# Patient Record
Sex: FEMALE | Race: WHITE | NOT HISPANIC OR LATINO | Employment: FULL TIME | URBAN - METROPOLITAN AREA
[De-identification: names, ages, dates, MRNs, and addresses within clinical notes are randomized per-mention and may not be internally consistent; named-entity substitution may affect disease eponyms.]

---

## 2020-11-21 ENCOUNTER — HOSPITAL ENCOUNTER (EMERGENCY)
Facility: HOSPITAL | Age: 40
Discharge: HOME | End: 2020-11-21
Attending: EMERGENCY MEDICINE
Payer: OTHER MISCELLANEOUS

## 2020-11-21 VITALS
HEIGHT: 66 IN | DIASTOLIC BLOOD PRESSURE: 81 MMHG | OXYGEN SATURATION: 99 % | HEART RATE: 100 BPM | WEIGHT: 220 LBS | TEMPERATURE: 98 F | BODY MASS INDEX: 35.36 KG/M2 | SYSTOLIC BLOOD PRESSURE: 126 MMHG | RESPIRATION RATE: 16 BRPM

## 2020-11-21 DIAGNOSIS — S16.1XXA STRAIN OF NECK MUSCLE, INITIAL ENCOUNTER: Primary | ICD-10-CM

## 2020-11-21 DIAGNOSIS — S00.03XA CONTUSION OF SCALP, INITIAL ENCOUNTER: ICD-10-CM

## 2020-11-21 PROCEDURE — 99282 EMERGENCY DEPT VISIT SF MDM: CPT

## 2020-11-21 NOTE — Clinical Note
"Uma Bello" Willem was seen and treated in our emergency department on 11/21/2020.  She may return to work on 11/30/2020.       If you have any questions or concerns, please don't hesitate to call.      Mikhail Nance MD"

## 2020-11-21 NOTE — ED NOTES
AAOx4, skin warm/dry, respirations even/unlabored.  NAD.  Patient verbalizes readiness for discharge.  [facility] form faxed to [facility] and original given to patient.
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
## 2020-11-30 ENCOUNTER — OFFICE VISIT (OUTPATIENT)
Dept: URGENT CARE | Facility: CLINIC | Age: 40
End: 2020-11-30
Payer: OTHER MISCELLANEOUS

## 2020-11-30 VITALS
HEIGHT: 66 IN | SYSTOLIC BLOOD PRESSURE: 139 MMHG | TEMPERATURE: 99 F | WEIGHT: 242 LBS | BODY MASS INDEX: 38.89 KG/M2 | HEART RATE: 87 BPM | DIASTOLIC BLOOD PRESSURE: 87 MMHG | OXYGEN SATURATION: 99 %

## 2020-11-30 DIAGNOSIS — S46.919A STRAIN OF SHOULDER, UNSPECIFIED LATERALITY, INITIAL ENCOUNTER: ICD-10-CM

## 2020-11-30 DIAGNOSIS — S16.1XXA CERVICAL STRAIN, ACUTE, INITIAL ENCOUNTER: Primary | ICD-10-CM
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
## 2020-11-30 PROCEDURE — 99204 OFFICE O/P NEW MOD 45 MIN: CPT | Mod: 25,S$GLB,, | Performed by: NURSE PRACTITIONER

## 2020-11-30 PROCEDURE — 96372 THER/PROPH/DIAG INJ SC/IM: CPT | Mod: S$GLB,,, | Performed by: NURSE PRACTITIONER

## 2020-11-30 PROCEDURE — 96372 PR INJECTION,THERAP/PROPH/DIAG2ST, IM OR SUBCUT: ICD-10-PCS | Mod: S$GLB,,, | Performed by: NURSE PRACTITIONER

## 2020-11-30 PROCEDURE — 99204 PR OFFICE/OUTPT VISIT, NEW, LEVL IV, 45-59 MIN: ICD-10-PCS | Mod: 25,S$GLB,, | Performed by: NURSE PRACTITIONER

## 2020-11-30 RX ORDER — METHOCARBAMOL 500 MG/1
500 TABLET, FILM COATED ORAL 3 TIMES DAILY PRN
Qty: 30 TABLET | Refills: 0 | Status: SHIPPED | OUTPATIENT
Start: 2020-11-30 | End: 2020-12-10

## 2020-11-30 RX ORDER — KETOROLAC TROMETHAMINE 30 MG/ML
30 INJECTION, SOLUTION INTRAMUSCULAR; INTRAVENOUS
Status: COMPLETED | OUTPATIENT
Start: 2020-11-30 | End: 2020-11-30

## 2020-11-30 RX ORDER — DEXAMETHASONE SODIUM PHOSPHATE 4 MG/ML
8 INJECTION, SOLUTION INTRA-ARTICULAR; INTRALESIONAL; INTRAMUSCULAR; INTRAVENOUS; SOFT TISSUE
Status: COMPLETED | OUTPATIENT
Start: 2020-11-30 | End: 2020-11-30

## 2020-11-30 RX ORDER — DICLOFENAC SODIUM 50 MG/1
50 TABLET, DELAYED RELEASE ORAL 3 TIMES DAILY PRN
Qty: 30 TABLET | Refills: 0 | Status: SHIPPED | OUTPATIENT
Start: 2020-11-30 | End: 2022-07-26 | Stop reason: ALTCHOICE

## 2020-11-30 RX ORDER — PREDNISONE 20 MG/1
20 TABLET ORAL 2 TIMES DAILY
Qty: 10 TABLET | Refills: 0 | Status: SHIPPED | OUTPATIENT
Start: 2020-11-30 | End: 2020-12-05

## 2020-11-30 RX ADMIN — DEXAMETHASONE SODIUM PHOSPHATE 8 MG: 4 INJECTION, SOLUTION INTRA-ARTICULAR; INTRALESIONAL; INTRAMUSCULAR; INTRAVENOUS; SOFT TISSUE at 07:11

## 2020-11-30 RX ADMIN — KETOROLAC TROMETHAMINE 30 MG: 30 INJECTION, SOLUTION INTRAMUSCULAR; INTRAVENOUS at 07:11
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
## 2020-12-02 ENCOUNTER — TELEPHONE (OUTPATIENT)
Dept: URGENT CARE | Facility: CLINIC | Age: 40
End: 2020-12-02

## 2020-12-02 NOTE — TELEPHONE ENCOUNTER
Pt called concerned about her prescribed meds. Stated she is feeling really jittery and not sleeping much. rabia recommended she discontinue taking the steroids.

## 2020-12-04 ENCOUNTER — OFFICE VISIT (OUTPATIENT)
Dept: URGENT CARE | Facility: CLINIC | Age: 40
End: 2020-12-04
Payer: OTHER MISCELLANEOUS

## 2020-12-04 VITALS
HEART RATE: 79 BPM | HEIGHT: 66 IN | OXYGEN SATURATION: 97 % | SYSTOLIC BLOOD PRESSURE: 125 MMHG | BODY MASS INDEX: 38.09 KG/M2 | RESPIRATION RATE: 16 BRPM | DIASTOLIC BLOOD PRESSURE: 87 MMHG | WEIGHT: 237 LBS

## 2020-12-04 DIAGNOSIS — S13.9XXA NECK SPRAIN, INITIAL ENCOUNTER: Primary | ICD-10-CM

## 2020-12-04 PROCEDURE — 99214 PR OFFICE/OUTPT VISIT, EST, LEVL IV, 30-39 MIN: ICD-10-PCS | Mod: S$GLB,,, | Performed by: NURSE PRACTITIONER

## 2020-12-04 PROCEDURE — 99214 OFFICE O/P EST MOD 30 MIN: CPT | Mod: S$GLB,,, | Performed by: NURSE PRACTITIONER
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
## 2020-12-04 NOTE — PATIENT INSTRUCTIONS
Neck Sprain or Strain  A sudden force that causes turning or bending of the neck can cause sprain or strain. An example would be the force from a car accident. This can stretch or tear muscles called a strain. It can also stretch or tear ligaments called a sprain. Either of these can cause neck pain. Sometimes neck pain occurs after a simple awkward movement. In either case, muscle spasm is commonly present and contributes to the pain.     Unless you had a forceful physical injury (for example, a car accident or fall), X-rays are usually not ordered for the initial evaluation of neck pain. If pain continues and dose not respond to medical treatment, X-rays and other tests may be performed at a later time.  Home care  · You may feel more soreness and spasm the first few days after the injury. Rest until symptoms begin to improve.  · When lying down, use a comfortable pillow or a rolled towel that supports the head and keeps the spine in a neutral position. The position of the head should not be tilted forward or backward.  · Apply an ice pack over the injured area for 15 to 20 minutes every 3 to 6 hours. You should do this for the first 24 to 48 hours. You can make an ice pack by filling a plastic bag that seals at the top with ice cubes and then wrapping it with a thin towel. After 48 hours, apply heat (warm shower or warm bath) for 15 to 20 minutes several times a day, or alternate ice and heat.  · You may use over-the-counter pain medicine to control pain, unless another pain medicine was prescribed. If you have chronic liver or kidney disease or ever had a stomach ulcer or GI bleeding, talk with your healthcare provider before using these medicines.  · If a soft cervical collar was prescribed, it should be worn only for periods of increased pain. It should not be worn for more than 3 hours a day, or for a period longer than 1 to 2 weeks.  Follow-up care  Follow up with your healthcare provider as directed.  Physical therapy may be needed.  Sometimes fractures dont show up on the first X-ray. Bruises and sprains can sometimes hurt as much as a fracture. These injuries can take time to heal completely. If your symptoms dont improve or they get worse, talk with your healthcare provider. You may need a repeat X-ray or other tests. If X-rays were taken, you will be told of any new findings that may affect your care.  Call 911  Call 911 if you have:  · Neck swelling, difficulty or painful swallowing  · Difficulty breathing  · Chest pain  When to seek medical advice  Call your healthcare provider right away if any of these occur:  · Pain becomes worse or spreads into your arms  · Weakness or numbness in one or both arms  Date Last Reviewed: 11/19/2015  © 2062-5332 [employer]. [address] [place] [address], [place] [address]. All rights reserved. This information is not intended as a substitute for professional medical care. Always follow your healthcare professional's instructions.

## 2020-12-14 RX ORDER — METHOCARBAMOL 750 MG/1
TABLET, FILM COATED ORAL
Qty: 15 TABLET | Refills: 0 | Status: SHIPPED | OUTPATIENT
Start: 2020-12-14 | End: 2022-07-26 | Stop reason: ALTCHOICE

## 2020-12-14 RX ORDER — DICLOFENAC SODIUM 50 MG/1
50 TABLET, DELAYED RELEASE ORAL 2 TIMES DAILY
Qty: 20 TABLET | Refills: 0 | Status: SHIPPED | OUTPATIENT
Start: 2020-12-14 | End: 2020-12-24

## 2020-12-15 ENCOUNTER — TELEPHONE (OUTPATIENT)
Dept: URGENT CARE | Facility: CLINIC | Age: 40
End: 2020-12-15

## 2020-12-15 NOTE — TELEPHONE ENCOUNTER
Work comp called yesterday to see if we could prescribe mrs. Bangura something before her visit with ortho on Wednesday to get her through today. Medication was sent in last night and just called patient to inform her of med call in.

## 2021-05-04 ENCOUNTER — PATIENT MESSAGE (OUTPATIENT)
Dept: RESEARCH | Facility: HOSPITAL | Age: 41
End: 2021-05-04

## 2021-08-01 ENCOUNTER — HOSPITAL ENCOUNTER (EMERGENCY)
Facility: HOSPITAL | Age: 41
Discharge: HOME | End: 2021-08-01
Attending: EMERGENCY MEDICINE
Payer: COMMERCIAL

## 2021-08-01 VITALS
RESPIRATION RATE: 18 BRPM | HEART RATE: 99 BPM | SYSTOLIC BLOOD PRESSURE: 129 MMHG | WEIGHT: 230.38 LBS | BODY MASS INDEX: 37.03 KG/M2 | HEIGHT: 66 IN | TEMPERATURE: 100 F | OXYGEN SATURATION: 100 % | DIASTOLIC BLOOD PRESSURE: 75 MMHG

## 2021-08-01 DIAGNOSIS — U07.1 COVID-19 VIRUS INFECTION: Primary | ICD-10-CM

## 2021-08-01 DIAGNOSIS — U07.1 COVID-19 VIRUS DETECTED: ICD-10-CM

## 2021-08-01 LAB — SARS-COV-2 RDRP RESP QL NAA+PROBE: POSITIVE

## 2021-08-01 PROCEDURE — U0002 COVID-19 LAB TEST NON-CDC: HCPCS | Performed by: EMERGENCY MEDICINE

## 2021-08-01 PROCEDURE — 99283 EMERGENCY DEPT VISIT LOW MDM: CPT

## 2022-07-26 ENCOUNTER — OFFICE VISIT (OUTPATIENT)
Dept: FAMILY MEDICINE | Facility: CLINIC | Age: 42
End: 2022-07-26
Payer: COMMERCIAL

## 2022-07-26 ENCOUNTER — LAB VISIT (OUTPATIENT)
Dept: LAB | Facility: HOSPITAL | Age: 42
End: 2022-07-26
Attending: NURSE PRACTITIONER
Payer: COMMERCIAL

## 2022-07-26 ENCOUNTER — HOSPITAL ENCOUNTER (OUTPATIENT)
Dept: RADIOLOGY | Facility: HOSPITAL | Age: 42
Discharge: HOME | End: 2022-07-26
Attending: NURSE PRACTITIONER
Payer: COMMERCIAL

## 2022-07-26 VITALS
DIASTOLIC BLOOD PRESSURE: 72 MMHG | WEIGHT: 239.19 LBS | SYSTOLIC BLOOD PRESSURE: 128 MMHG | HEART RATE: 82 BPM | HEIGHT: 66 IN | BODY MASS INDEX: 38.44 KG/M2 | TEMPERATURE: 98 F | OXYGEN SATURATION: 97 %

## 2022-07-26 DIAGNOSIS — F41.1 ANXIETY STATE: ICD-10-CM

## 2022-07-26 DIAGNOSIS — Z00.00 ANNUAL PHYSICAL EXAM: Primary | ICD-10-CM

## 2022-07-26 DIAGNOSIS — Z11.59 ENCOUNTER FOR HEPATITIS C SCREENING TEST FOR LOW RISK PATIENT: ICD-10-CM

## 2022-07-26 DIAGNOSIS — K21.9 GASTROESOPHAGEAL REFLUX DISEASE WITHOUT ESOPHAGITIS: ICD-10-CM

## 2022-07-26 DIAGNOSIS — Z11.4 SCREENING FOR HIV (HUMAN IMMUNODEFICIENCY VIRUS): ICD-10-CM

## 2022-07-26 DIAGNOSIS — Z12.39 ENCOUNTER FOR SCREENING FOR MALIGNANT NEOPLASM OF BREAST, UNSPECIFIED SCREENING MODALITY: ICD-10-CM

## 2022-07-26 DIAGNOSIS — E04.9 NODULAR GOITER: ICD-10-CM

## 2022-07-26 DIAGNOSIS — Z01.411 ENCOUNTER FOR GYNECOLOGICAL EXAMINATION WITH ABNORMAL FINDING: ICD-10-CM

## 2022-07-26 DIAGNOSIS — Z00.00 ANNUAL PHYSICAL EXAM: ICD-10-CM

## 2022-07-26 LAB
ALBUMIN SERPL BCP-MCNC: 3.6 G/DL (ref 3.5–5.2)
ALP SERPL-CCNC: 77 U/L (ref 55–135)
ALT SERPL W/O P-5'-P-CCNC: 17 U/L (ref 10–44)
ANION GAP SERPL CALC-SCNC: 11 MMOL/L (ref 8–16)
AST SERPL-CCNC: 19 U/L (ref 10–40)
BASOPHILS # BLD AUTO: 0.05 K/UL (ref 0–0.2)
BASOPHILS NFR BLD: 0.9 % (ref 0–1.9)
BILIRUB SERPL-MCNC: 0.4 MG/DL (ref 0.1–1)
BUN SERPL-MCNC: 13 MG/DL (ref 6–20)
CALCIUM SERPL-MCNC: 9.4 MG/DL (ref 8.7–10.5)
CHLORIDE SERPL-SCNC: 104 MMOL/L (ref 95–110)
CHOLEST SERPL-MCNC: 179 MG/DL (ref 120–199)
CHOLEST/HDLC SERPL: 3.1 {RATIO} (ref 2–5)
CO2 SERPL-SCNC: 23 MMOL/L (ref 23–29)
CREAT SERPL-MCNC: 0.9 MG/DL (ref 0.5–1.4)
DIFFERENTIAL METHOD: ABNORMAL
EOSINOPHIL # BLD AUTO: 0.1 K/UL (ref 0–0.5)
EOSINOPHIL NFR BLD: 1.7 % (ref 0–8)
ERYTHROCYTE [DISTWIDTH] IN BLOOD BY AUTOMATED COUNT: 13.2 % (ref 11.5–14.5)
EST. GFR  (AFRICAN AMERICAN): >60 ML/MIN/1.73 M^2
EST. GFR  (NON AFRICAN AMERICAN): >60 ML/MIN/1.73 M^2
ESTIMATED AVG GLUCOSE: 105 MG/DL (ref 68–131)
GLUCOSE SERPL-MCNC: 78 MG/DL (ref 70–110)
HBA1C MFR BLD: 5.3 % (ref 4–5.6)
HCT VFR BLD AUTO: 33.4 % (ref 37–48.5)
HDLC SERPL-MCNC: 58 MG/DL (ref 40–75)
HDLC SERPL: 32.4 % (ref 20–50)
HGB BLD-MCNC: 10.3 G/DL (ref 12–16)
IMM GRANULOCYTES # BLD AUTO: 0.01 K/UL (ref 0–0.04)
IMM GRANULOCYTES NFR BLD AUTO: 0.2 % (ref 0–0.5)
LDLC SERPL CALC-MCNC: 88 MG/DL (ref 63–159)
LYMPHOCYTES # BLD AUTO: 2.2 K/UL (ref 1–4.8)
LYMPHOCYTES NFR BLD: 41.5 % (ref 18–48)
MCH RBC QN AUTO: 25.8 PG (ref 27–31)
MCHC RBC AUTO-ENTMCNC: 30.8 G/DL (ref 32–36)
MCV RBC AUTO: 84 FL (ref 82–98)
MONOCYTES # BLD AUTO: 0.3 K/UL (ref 0.3–1)
MONOCYTES NFR BLD: 6.4 % (ref 4–15)
NEUTROPHILS # BLD AUTO: 2.6 K/UL (ref 1.8–7.7)
NEUTROPHILS NFR BLD: 49.3 % (ref 38–73)
NONHDLC SERPL-MCNC: 121 MG/DL
NRBC BLD-RTO: 0 /100 WBC
PLATELET # BLD AUTO: 278 K/UL (ref 150–450)
PMV BLD AUTO: 10.7 FL (ref 9.2–12.9)
POTASSIUM SERPL-SCNC: 4.1 MMOL/L (ref 3.5–5.1)
PROT SERPL-MCNC: 6.7 G/DL (ref 6–8.4)
RBC # BLD AUTO: 4 M/UL (ref 4–5.4)
SODIUM SERPL-SCNC: 138 MMOL/L (ref 136–145)
TRIGL SERPL-MCNC: 165 MG/DL (ref 30–150)
TSH SERPL DL<=0.005 MIU/L-ACNC: 1.9 UIU/ML (ref 0.4–4)
WBC # BLD AUTO: 5.35 K/UL (ref 3.9–12.7)

## 2022-07-26 PROCEDURE — 36415 COLL VENOUS BLD VENIPUNCTURE: CPT | Mod: PO | Performed by: NURSE PRACTITIONER

## 2022-07-26 PROCEDURE — 1159F MED LIST DOCD IN RCRD: CPT | Mod: CPTII,S$GLB,, | Performed by: NURSE PRACTITIONER

## 2022-07-26 PROCEDURE — 3078F DIAST BP <80 MM HG: CPT | Mod: CPTII,S$GLB,, | Performed by: NURSE PRACTITIONER

## 2022-07-26 PROCEDURE — 3074F SYST BP LT 130 MM HG: CPT | Mod: CPTII,S$GLB,, | Performed by: NURSE PRACTITIONER

## 2022-07-26 PROCEDURE — 1160F PR REVIEW ALL MEDS BY PRESCRIBER/CLIN PHARMACIST DOCUMENTED: ICD-10-PCS | Mod: CPTII,S$GLB,, | Performed by: NURSE PRACTITIONER

## 2022-07-26 PROCEDURE — 87389 HIV-1 AG W/HIV-1&-2 AB AG IA: CPT | Performed by: NURSE PRACTITIONER

## 2022-07-26 PROCEDURE — 99386 PR PREVENTIVE VISIT,NEW,40-64: ICD-10-PCS | Mod: S$GLB,,, | Performed by: NURSE PRACTITIONER

## 2022-07-26 PROCEDURE — 80061 LIPID PANEL: CPT | Performed by: NURSE PRACTITIONER

## 2022-07-26 PROCEDURE — 99386 PREV VISIT NEW AGE 40-64: CPT | Mod: S$GLB,,, | Performed by: NURSE PRACTITIONER

## 2022-07-26 PROCEDURE — 1160F RVW MEDS BY RX/DR IN RCRD: CPT | Mod: CPTII,S$GLB,, | Performed by: NURSE PRACTITIONER

## 2022-07-26 PROCEDURE — 86803 HEPATITIS C AB TEST: CPT | Performed by: NURSE PRACTITIONER

## 2022-07-26 PROCEDURE — 99999 PR PBB SHADOW E&M-EST. PATIENT-LVL IV: ICD-10-PCS | Mod: PBBFAC,,, | Performed by: NURSE PRACTITIONER

## 2022-07-26 PROCEDURE — 3008F BODY MASS INDEX DOCD: CPT | Mod: CPTII,S$GLB,, | Performed by: NURSE PRACTITIONER

## 2022-07-26 PROCEDURE — 85025 COMPLETE CBC W/AUTO DIFF WBC: CPT | Performed by: NURSE PRACTITIONER

## 2022-07-26 PROCEDURE — 3078F PR MOST RECENT DIASTOLIC BLOOD PRESSURE < 80 MM HG: ICD-10-PCS | Mod: CPTII,S$GLB,, | Performed by: NURSE PRACTITIONER

## 2022-07-26 PROCEDURE — 1159F PR MEDICATION LIST DOCUMENTED IN MEDICAL RECORD: ICD-10-PCS | Mod: CPTII,S$GLB,, | Performed by: NURSE PRACTITIONER

## 2022-07-26 PROCEDURE — 99999 PR PBB SHADOW E&M-EST. PATIENT-LVL IV: CPT | Mod: PBBFAC,,, | Performed by: NURSE PRACTITIONER

## 2022-07-26 PROCEDURE — 3008F PR BODY MASS INDEX (BMI) DOCUMENTED: ICD-10-PCS | Mod: CPTII,S$GLB,, | Performed by: NURSE PRACTITIONER

## 2022-07-26 PROCEDURE — 84443 ASSAY THYROID STIM HORMONE: CPT | Performed by: NURSE PRACTITIONER

## 2022-07-26 PROCEDURE — 77063 BREAST TOMOSYNTHESIS BI: CPT | Mod: TC,PO

## 2022-07-26 PROCEDURE — 3074F PR MOST RECENT SYSTOLIC BLOOD PRESSURE < 130 MM HG: ICD-10-PCS | Mod: CPTII,S$GLB,, | Performed by: NURSE PRACTITIONER

## 2022-07-26 PROCEDURE — 77067 SCR MAMMO BI INCL CAD: CPT | Mod: TC,PO

## 2022-07-26 PROCEDURE — 83036 HEMOGLOBIN GLYCOSYLATED A1C: CPT | Performed by: NURSE PRACTITIONER

## 2022-07-26 PROCEDURE — 80053 COMPREHEN METABOLIC PANEL: CPT | Performed by: NURSE PRACTITIONER

## 2022-07-26 RX ORDER — CYCLOSPORINE 0.5 MG/ML
1 EMULSION OPHTHALMIC
COMMUNITY
End: 2022-07-26

## 2022-07-26 NOTE — PROGRESS NOTES
Subjective:       Patient ID: Uma Bangura is a 42 y.o. female.    Chief Complaint: Annual Exam    42-year-old female presents to the clinic today for annual physical exam.  Requests a referral to gyn for Pap smear and hormones checkup.  She has had a chronic goiter for many years.  She is currently only on eyedrops for dry eyes.  She takes over-the-counter medication as needed for acid reflux.  She denies any cardiac chest pain, heart palpitations, shortness of breath, swelling to lower extremities.  She denies any coughing or wheezing.  She denies any headaches, dizziness, or blurred vision.    Past Medical History:   Diagnosis Date    Anxiety state, unspecified     Disease of spleen, unspecified     Disturbance of salivary secretion     Edema     GERD (gastroesophageal reflux disease)     Goiter, unspecified     Nonspecific elevation of levels of transaminase or lactic acid dehydrogenase (LDH)     Polyphagia(783.6)     Unspecified sleep apnea     Varices of other sites      Past Surgical History:   Procedure Laterality Date    2 c- section      left ovary removed        reports that she has never smoked. She has never used smokeless tobacco. She reports current alcohol use. She reports that she does not use drugs.  Review of Systems   Constitutional: Negative for chills and fever.   HENT: Negative for congestion and sore throat.    Eyes: Negative for discharge and itching.   Respiratory: Negative for cough, shortness of breath and wheezing.    Cardiovascular: Negative for chest pain, palpitations and leg swelling.   Gastrointestinal: Negative for abdominal pain, blood in stool, constipation, diarrhea, nausea and vomiting.   Musculoskeletal: Negative for gait problem.   Neurological: Positive for headaches. Negative for dizziness, weakness and light-headedness.       Objective:      Physical Exam  Vitals and nursing note reviewed.   Constitutional:       General: She is not in acute distress.      Appearance: Normal appearance. She is well-developed. She is not toxic-appearing or diaphoretic.   HENT:      Head: Normocephalic and atraumatic.      Right Ear: Tympanic membrane, ear canal and external ear normal. There is no impacted cerumen.      Left Ear: Tympanic membrane, ear canal and external ear normal. There is no impacted cerumen.      Nose: Nose normal. No congestion or rhinorrhea.      Mouth/Throat:      Mouth: Mucous membranes are moist.      Pharynx: No oropharyngeal exudate or posterior oropharyngeal erythema.   Eyes:      General: No scleral icterus.        Right eye: No discharge.         Left eye: No discharge.      Extraocular Movements: Extraocular movements intact.      Conjunctiva/sclera: Conjunctivae normal.      Pupils: Pupils are equal, round, and reactive to light.   Neck:      Thyroid: No thyromegaly.      Vascular: No JVD.      Comments: Goiter noted   Cardiovascular:      Rate and Rhythm: Normal rate and regular rhythm.      Heart sounds: No murmur heard.    No friction rub.   Pulmonary:      Effort: Pulmonary effort is normal. No respiratory distress.      Breath sounds: Normal breath sounds. No wheezing or rales.   Abdominal:      General: Bowel sounds are normal.      Palpations: Abdomen is soft.      Tenderness: There is no abdominal tenderness. There is no guarding or rebound.   Musculoskeletal:         General: Normal range of motion.      Cervical back: Normal range of motion and neck supple.   Lymphadenopathy:      Cervical: No cervical adenopathy.   Skin:     General: Skin is warm and dry.      Findings: No rash.   Neurological:      Mental Status: She is alert and oriented to person, place, and time.      Deep Tendon Reflexes: Reflexes normal.   Psychiatric:         Mood and Affect: Mood normal.         Behavior: Behavior normal.         Thought Content: Thought content normal.         Judgment: Judgment normal.         Assessment:       1. Annual physical exam    2.  Gastroesophageal reflux disease without esophagitis    3. Nodular goiter    4. Anxiety state, unspecified    5. Encounter for hepatitis C screening test for low risk patient    6. Screening for HIV (human immunodeficiency virus)        Plan:         Annual physical exam  -     CBC Auto Differential; Future; Expected date: 07/26/2022  -     Comprehensive Metabolic Panel; Future; Expected date: 07/26/2022  -     Lipid Panel; Future; Expected date: 07/26/2022  -     Hemoglobin A1C; Future; Expected date: 07/26/2022  -     TSH; Future; Expected date: 07/26/2022    Gastroesophageal reflux disease without esophagitis  - takes OTC medications as needed     Nodular goiter  - stable will check a TSH    Anxiety state, unspecified  - stable at this time without any medications     Encounter for hepatitis C screening test for low risk patient  -     Hepatitis C Antibody; Future; Expected date: 07/26/2022    Screening for HIV (human immunodeficiency virus)  -     HIV 1/2 Ag/Ab (4th Gen); Future; Expected date: 07/26/2022    Encounter for gynecological examination with abnormal finding  -     Ambulatory referral/consult to Gynecology; Future; Expected date: 08/02/2022    Encounter for screening for malignant neoplasm of breast, unspecified screening modality  -     Mammo Digital Screening Bilat w/ Evens; Future; Expected date: 07/26/2022

## 2022-07-26 NOTE — PATIENT INSTRUCTIONS
Stefan Eaton,     If you are due for any health screening(s) below please notify me so we can arrange them to be ordered and scheduled to maintain your health. Most healthy patients complete it. Don't lose out on improving your health.     Tests to Keep You Healthy    Mammogram: DUE  Cervical Cancer Screening: DUE     Breast Cancer Screening    Breast cancer is the second most common cancer in women after skin cancer, and the second leading cause of death from cancer after lung cancer. Mammograms can detect breast cancer early, which significantly increases the chances of curing the cancer.      A screening mammogram is an x-ray image of the breasts used for early breast cancer detection. It can help reduce the number of deaths from breast cancer among women. To get a clear image, the breast is placed between two plastic plates to make it flat. How often a mammogram is needed depends on your age and your breast cancer risk.    Although you are still overdue for this important screening, due to the COVID-19 pandemic, we recommend scheduling it in the near future.              July 26, 2022       Uma Bangura  [address] [place] [address] [address]  [place] [address]           Dear Uma:    Your [facility] care is dedicated to helping you stay healthy with regular scheduled recommended screenings.  Scheduling routine screenings is important to maintaining good health. Our records indicate that you may be overdue for your screening pap smear.  Pap smear screening can help identify patients at risk for developing cervical cancer at an early stage, when it is most likely to be successfully treated.    The current recommendation for Pap smear screening is every 3-5 years.  We encourage you to schedule your appointment with your [facility] health provider.  Many women see a Gynecologist for this screening but some primary care providers also provide Pap screening  If you recently had your pap smear screening performed  outside of [facility], please let your Health care team know so that they can update your health record.      If you have questions or want to schedule your screening elsewhere, please call [phone] to speak to a [facility] coordinator.    Sincerely,    Your [facility] Care Team         - check fasting labs    - referral to GYN    - scheduled mammogram    - follow up with Dr. Pillai

## 2022-07-27 LAB — HIV 1+2 AB+HIV1 P24 AG SERPL QL IA: NEGATIVE

## 2022-07-28 LAB — HCV AB SERPL QL IA: NEGATIVE

## 2022-07-29 ENCOUNTER — TELEPHONE (OUTPATIENT)
Dept: FAMILY MEDICINE | Facility: CLINIC | Age: 42
End: 2022-07-29
Payer: COMMERCIAL

## 2022-07-29 DIAGNOSIS — D64.9 ANEMIA, UNSPECIFIED TYPE: Primary | ICD-10-CM

## 2022-07-29 NOTE — TELEPHONE ENCOUNTER
----- Message from Vidya Curran, FNP-C sent at 7/29/2022 11:23 AM CDT -----  Please call her and schedule a iron, TIBC, Ferritin, Vit B 12 and folate since her blood work shows anemia,  Thanks,  Aimee

## 2022-07-29 NOTE — TELEPHONE ENCOUNTER
Pt states that at the time of her lab appt she was on her monthly cycle and she had just given blood a few weeks prior. Do you still want her to repeat labs?

## 2022-09-07 ENCOUNTER — OFFICE VISIT (OUTPATIENT)
Dept: FAMILY MEDICINE | Facility: CLINIC | Age: 42
End: 2022-09-07
Payer: COMMERCIAL

## 2022-09-07 VITALS
DIASTOLIC BLOOD PRESSURE: 70 MMHG | HEIGHT: 66 IN | RESPIRATION RATE: 14 BRPM | HEART RATE: 97 BPM | TEMPERATURE: 99 F | OXYGEN SATURATION: 96 % | BODY MASS INDEX: 37.67 KG/M2 | WEIGHT: 234.38 LBS | SYSTOLIC BLOOD PRESSURE: 110 MMHG

## 2022-09-07 DIAGNOSIS — Z23 NEED FOR TDAP VACCINATION: ICD-10-CM

## 2022-09-07 DIAGNOSIS — E04.1 THYROID NODULE: ICD-10-CM

## 2022-09-07 DIAGNOSIS — E66.01 SEVERE OBESITY (BMI 35.0-39.9) WITH COMORBIDITY: ICD-10-CM

## 2022-09-07 DIAGNOSIS — E78.5 HYPERLIPIDEMIA, UNSPECIFIED HYPERLIPIDEMIA TYPE: ICD-10-CM

## 2022-09-07 DIAGNOSIS — D64.9 NORMOCYTIC ANEMIA: Primary | ICD-10-CM

## 2022-09-07 DIAGNOSIS — R41.89 BRAIN FOG: ICD-10-CM

## 2022-09-07 PROCEDURE — 1159F MED LIST DOCD IN RCRD: CPT | Mod: CPTII,S$GLB,, | Performed by: FAMILY MEDICINE

## 2022-09-07 PROCEDURE — 3044F PR MOST RECENT HEMOGLOBIN A1C LEVEL <7.0%: ICD-10-PCS | Mod: CPTII,S$GLB,, | Performed by: FAMILY MEDICINE

## 2022-09-07 PROCEDURE — 3074F PR MOST RECENT SYSTOLIC BLOOD PRESSURE < 130 MM HG: ICD-10-PCS | Mod: CPTII,S$GLB,, | Performed by: FAMILY MEDICINE

## 2022-09-07 PROCEDURE — 1159F PR MEDICATION LIST DOCUMENTED IN MEDICAL RECORD: ICD-10-PCS | Mod: CPTII,S$GLB,, | Performed by: FAMILY MEDICINE

## 2022-09-07 PROCEDURE — 3008F BODY MASS INDEX DOCD: CPT | Mod: CPTII,S$GLB,, | Performed by: FAMILY MEDICINE

## 2022-09-07 PROCEDURE — 3044F HG A1C LEVEL LT 7.0%: CPT | Mod: CPTII,S$GLB,, | Performed by: FAMILY MEDICINE

## 2022-09-07 PROCEDURE — 3008F PR BODY MASS INDEX (BMI) DOCUMENTED: ICD-10-PCS | Mod: CPTII,S$GLB,, | Performed by: FAMILY MEDICINE

## 2022-09-07 PROCEDURE — 3078F DIAST BP <80 MM HG: CPT | Mod: CPTII,S$GLB,, | Performed by: FAMILY MEDICINE

## 2022-09-07 PROCEDURE — 1160F RVW MEDS BY RX/DR IN RCRD: CPT | Mod: CPTII,S$GLB,, | Performed by: FAMILY MEDICINE

## 2022-09-07 PROCEDURE — 99214 OFFICE O/P EST MOD 30 MIN: CPT | Mod: S$GLB,,, | Performed by: FAMILY MEDICINE

## 2022-09-07 PROCEDURE — 3078F PR MOST RECENT DIASTOLIC BLOOD PRESSURE < 80 MM HG: ICD-10-PCS | Mod: CPTII,S$GLB,, | Performed by: FAMILY MEDICINE

## 2022-09-07 PROCEDURE — 99214 PR OFFICE/OUTPT VISIT, EST, LEVL IV, 30-39 MIN: ICD-10-PCS | Mod: S$GLB,,, | Performed by: FAMILY MEDICINE

## 2022-09-07 PROCEDURE — 99999 PR PBB SHADOW E&M-EST. PATIENT-LVL IV: CPT | Mod: PBBFAC,,, | Performed by: FAMILY MEDICINE

## 2022-09-07 PROCEDURE — 3074F SYST BP LT 130 MM HG: CPT | Mod: CPTII,S$GLB,, | Performed by: FAMILY MEDICINE

## 2022-09-07 PROCEDURE — 1160F PR REVIEW ALL MEDS BY PRESCRIBER/CLIN PHARMACIST DOCUMENTED: ICD-10-PCS | Mod: CPTII,S$GLB,, | Performed by: FAMILY MEDICINE

## 2022-09-07 PROCEDURE — 99999 PR PBB SHADOW E&M-EST. PATIENT-LVL IV: ICD-10-PCS | Mod: PBBFAC,,, | Performed by: FAMILY MEDICINE

## 2022-09-07 NOTE — PROGRESS NOTES
Subjective:       Patient ID: Uma Bangura is a 42 y.o. female.    Chief Complaint: Establish Care    HPI  Review of Systems   Constitutional:  Negative for fatigue and unexpected weight change.   Respiratory:  Negative for chest tightness and shortness of breath.    Cardiovascular:  Negative for chest pain, palpitations and leg swelling.   Gastrointestinal:  Negative for abdominal pain.   Musculoskeletal:  Negative for arthralgias.   Neurological:  Negative for dizziness, syncope, light-headedness and headaches.     Patient Active Problem List   Diagnosis    GERD (gastroesophageal reflux disease)    Nodular goiter    Unspecified sleep apnea    Nonspecific elevation of levels of transaminase or lactic acid dehydrogenase (LDH)    Anxiety state, unspecified    Malaise and fatigue    Herpes zoster    Edema     Patient is here to establish care   Reviewed labs from annual preventive visit NP Deblanc 7/26/22      Mild normocytic anemia. Donates blood regularly. Had h/o DUb and s/p ablation. Periods monthly cyclically.  . C/o brain fog and difficulty concentrating. Difficulty losing weight    H!v, hep c neg. Cmp, tsh, chol normal  Has GYN appt Dr. Orona 9/30/22  Mammo 7/2022 neg  Objective:      Physical Exam  Vitals and nursing note reviewed.   Constitutional:       Appearance: She is well-developed.   Cardiovascular:      Rate and Rhythm: Normal rate and regular rhythm.      Heart sounds: Normal heart sounds.   Pulmonary:      Effort: Pulmonary effort is normal.      Breath sounds: Normal breath sounds.   Skin:     General: Skin is warm and dry.   Neurological:      Mental Status: She is alert and oriented to person, place, and time.       Assessment:       1. Normocytic anemia    2. Need for Tdap vaccination    3. Brain fog    4. Thyroid nodule        Plan:         1. Normocytic anemia  Screen and treat as indicated:  Stop donating blood until anemia resolves  - CBC Auto Differential; Future  -  "Ferritin; Future  - Iron and TIBC; Future    2. Need for Tdap vaccination  declined    3. Brain fog  Screen and treat as indicated:    - Vitamin D; Future  - Vitamin B12; Future    4. Thyroid nodule  Screen and treat as indicated:    - US Soft Tissue Head Neck Thyroid; Future    5. Hyperlipidemia, unspecified hyperlipidemia type  Chol at goal. Your triglycerides are borderline high. I recommend a heart healthy diet rich in fiber, fresh vegetables and fruit and low in saturated fats (fried foods, red meat, etc.).  I also recommend regular exercise including a minimum of 150 minutes of cardio exercise per week and 2-30 minute workouts of strength training like light weights, yoga, pilates, etc.  You should work toward a body mass index of < 25.        6. Severe obesity (BMI 35.0-39.9) with comorbidity  Counseled patient on his ideal body weight, health consequences of being obese and current recommendations including weekly exercise and a heart healthy diet.  Current BMI is:Estimated body mass index is 37.82 kg/m² as calculated from the following:    Height as of this encounter: 5' 6" (1.676 m).    Weight as of this encounter: 106.3 kg (234 lb 5.6 oz)..  Patient is aware that ideal BMI < 25 or Weight in (lb) to have BMI = 25: 154.6.          Time spent with patient: 20 minutes    Patient with be reevaluated in 6 months or sooner prn    Greater than 50% of this visit was spent counseling as described in above documentation:Yes    "

## 2022-09-08 ENCOUNTER — HOSPITAL ENCOUNTER (OUTPATIENT)
Dept: RADIOLOGY | Facility: HOSPITAL | Age: 42
Discharge: HOME | End: 2022-09-08
Attending: FAMILY MEDICINE
Payer: COMMERCIAL

## 2022-09-08 DIAGNOSIS — E04.1 THYROID NODULE: ICD-10-CM

## 2022-09-08 PROCEDURE — 76536 US SOFT TISSUE HEAD NECK THYROID: ICD-10-PCS | Mod: 26,,, | Performed by: RADIOLOGY

## 2022-09-08 PROCEDURE — 76536 US EXAM OF HEAD AND NECK: CPT | Mod: 26,,, | Performed by: RADIOLOGY

## 2022-09-08 PROCEDURE — 76536 US EXAM OF HEAD AND NECK: CPT | Mod: TC

## 2022-09-10 ENCOUNTER — LAB VISIT (OUTPATIENT)
Dept: LAB | Facility: HOSPITAL | Age: 42
End: 2022-09-10
Attending: FAMILY MEDICINE
Payer: COMMERCIAL

## 2022-09-10 DIAGNOSIS — D64.9 ANEMIA, UNSPECIFIED TYPE: ICD-10-CM

## 2022-09-10 DIAGNOSIS — R41.89 BRAIN FOG: ICD-10-CM

## 2022-09-10 DIAGNOSIS — D50.9 IRON DEFICIENCY ANEMIA, UNSPECIFIED IRON DEFICIENCY ANEMIA TYPE: Primary | ICD-10-CM

## 2022-09-10 DIAGNOSIS — D64.9 NORMOCYTIC ANEMIA: ICD-10-CM

## 2022-09-10 PROBLEM — E78.5 HYPERLIPIDEMIA: Status: ACTIVE | Noted: 2022-09-10

## 2022-09-10 LAB
25(OH)D3+25(OH)D2 SERPL-MCNC: 42 NG/ML (ref 30–96)
BASOPHILS # BLD AUTO: 0.03 K/UL (ref 0–0.2)
BASOPHILS NFR BLD: 0.5 % (ref 0–1.9)
DIFFERENTIAL METHOD: ABNORMAL
EOSINOPHIL # BLD AUTO: 0.1 K/UL (ref 0–0.5)
EOSINOPHIL NFR BLD: 1.2 % (ref 0–8)
ERYTHROCYTE [DISTWIDTH] IN BLOOD BY AUTOMATED COUNT: 13.3 % (ref 11.5–14.5)
FERRITIN SERPL-MCNC: 5 NG/ML (ref 20–300)
FERRITIN SERPL-MCNC: 5 NG/ML (ref 20–300)
FOLATE SERPL-MCNC: 10.1 NG/ML (ref 4–24)
HCT VFR BLD AUTO: 36.8 % (ref 37–48.5)
HGB BLD-MCNC: 11.6 G/DL (ref 12–16)
IMM GRANULOCYTES # BLD AUTO: 0.01 K/UL (ref 0–0.04)
IMM GRANULOCYTES NFR BLD AUTO: 0.2 % (ref 0–0.5)
IRON SERPL-MCNC: 66 UG/DL (ref 30–160)
IRON SERPL-MCNC: 66 UG/DL (ref 30–160)
LYMPHOCYTES # BLD AUTO: 2.6 K/UL (ref 1–4.8)
LYMPHOCYTES NFR BLD: 44.5 % (ref 18–48)
MCH RBC QN AUTO: 25.4 PG (ref 27–31)
MCHC RBC AUTO-ENTMCNC: 31.5 G/DL (ref 32–36)
MCV RBC AUTO: 81 FL (ref 82–98)
MONOCYTES # BLD AUTO: 0.4 K/UL (ref 0.3–1)
MONOCYTES NFR BLD: 6.4 % (ref 4–15)
NEUTROPHILS # BLD AUTO: 2.8 K/UL (ref 1.8–7.7)
NEUTROPHILS NFR BLD: 47.2 % (ref 38–73)
NRBC BLD-RTO: 0 /100 WBC
PLATELET # BLD AUTO: 256 K/UL (ref 150–450)
PMV BLD AUTO: 11.2 FL (ref 9.2–12.9)
RBC # BLD AUTO: 4.56 M/UL (ref 4–5.4)
SATURATED IRON: 13 % (ref 20–50)
SATURATED IRON: 13 % (ref 20–50)
TOTAL IRON BINDING CAPACITY: 497 UG/DL (ref 250–450)
TOTAL IRON BINDING CAPACITY: 497 UG/DL (ref 250–450)
TRANSFERRIN SERPL-MCNC: 336 MG/DL (ref 200–375)
TRANSFERRIN SERPL-MCNC: 336 MG/DL (ref 200–375)
VIT B12 SERPL-MCNC: 1000 PG/ML (ref 210–950)
VIT B12 SERPL-MCNC: 1000 PG/ML (ref 210–950)
WBC # BLD AUTO: 5.82 K/UL (ref 3.9–12.7)

## 2022-09-10 PROCEDURE — 84466 ASSAY OF TRANSFERRIN: CPT | Performed by: NURSE PRACTITIONER

## 2022-09-10 PROCEDURE — 85025 COMPLETE CBC W/AUTO DIFF WBC: CPT | Performed by: FAMILY MEDICINE

## 2022-09-10 PROCEDURE — 82728 ASSAY OF FERRITIN: CPT | Performed by: NURSE PRACTITIONER

## 2022-09-10 PROCEDURE — 82746 ASSAY OF FOLIC ACID SERUM: CPT | Performed by: NURSE PRACTITIONER

## 2022-09-10 PROCEDURE — 82607 VITAMIN B-12: CPT | Performed by: NURSE PRACTITIONER

## 2022-09-10 PROCEDURE — 36415 COLL VENOUS BLD VENIPUNCTURE: CPT | Mod: PO | Performed by: NURSE PRACTITIONER

## 2022-09-10 PROCEDURE — 82306 VITAMIN D 25 HYDROXY: CPT | Performed by: FAMILY MEDICINE

## 2022-09-10 RX ORDER — FERROUS SULFATE 325(65) MG
325 TABLET, DELAYED RELEASE (ENTERIC COATED) ORAL
Qty: 90 TABLET | Refills: 1 | Status: SHIPPED | OUTPATIENT
Start: 2022-09-10 | End: 2022-09-14 | Stop reason: SDUPTHER

## 2022-09-13 ENCOUNTER — TELEPHONE (OUTPATIENT)
Dept: FAMILY MEDICINE | Facility: CLINIC | Age: 42
End: 2022-09-13
Payer: COMMERCIAL

## 2022-09-13 DIAGNOSIS — D50.9 IRON DEFICIENCY ANEMIA, UNSPECIFIED IRON DEFICIENCY ANEMIA TYPE: ICD-10-CM

## 2022-09-13 NOTE — TELEPHONE ENCOUNTER
----- Message from Inés Pittman sent at 9/12/2022 10:07 AM CDT -----  Contact: Liza  Type:  Pharmacy Calling to Clarify an RX    Name of Caller: Liza     Pharmacy Name: [facility]     Prescription Name: ferrous sulfate 325 (65 FE) MG EC tablet        What do they need to clarify?: directions     Best Call Back Number: [phone]    Additional Information:

## 2022-09-14 RX ORDER — FERROUS SULFATE 325(65) MG
325 TABLET, DELAYED RELEASE (ENTERIC COATED) ORAL DAILY
Qty: 90 TABLET | Refills: 1 | Status: SHIPPED | OUTPATIENT
Start: 2022-09-14 | End: 2023-04-26

## 2022-09-30 ENCOUNTER — LAB VISIT (OUTPATIENT)
Dept: LAB | Facility: HOSPITAL | Age: 42
End: 2022-09-30
Attending: OBSTETRICS & GYNECOLOGY
Payer: COMMERCIAL

## 2022-09-30 ENCOUNTER — HOSPITAL ENCOUNTER (OUTPATIENT)
Dept: RADIOLOGY | Facility: HOSPITAL | Age: 42
Discharge: HOME | End: 2022-09-30
Attending: OBSTETRICS & GYNECOLOGY
Payer: COMMERCIAL

## 2022-09-30 ENCOUNTER — OFFICE VISIT (OUTPATIENT)
Dept: OBSTETRICS AND GYNECOLOGY | Facility: CLINIC | Age: 42
End: 2022-09-30
Payer: COMMERCIAL

## 2022-09-30 VITALS
WEIGHT: 230.94 LBS | BODY MASS INDEX: 37.11 KG/M2 | DIASTOLIC BLOOD PRESSURE: 82 MMHG | SYSTOLIC BLOOD PRESSURE: 124 MMHG | RESPIRATION RATE: 16 BRPM | HEIGHT: 66 IN

## 2022-09-30 DIAGNOSIS — D50.0 IRON DEFICIENCY ANEMIA DUE TO CHRONIC BLOOD LOSS: ICD-10-CM

## 2022-09-30 DIAGNOSIS — Z01.419 ENCOUNTER FOR GYNECOLOGICAL EXAMINATION WITHOUT ABNORMAL FINDING: Primary | ICD-10-CM

## 2022-09-30 DIAGNOSIS — L65.9 HAIR LOSS: ICD-10-CM

## 2022-09-30 DIAGNOSIS — N93.9 ABNORMAL UTERINE BLEEDING (AUB): ICD-10-CM

## 2022-09-30 DIAGNOSIS — Z12.4 SCREENING FOR MALIGNANT NEOPLASM OF CERVIX: ICD-10-CM

## 2022-09-30 LAB
ESTRADIOL SERPL-MCNC: 133 PG/ML
FSH SERPL-ACNC: 21.12 MIU/ML
LH SERPL-ACNC: 21.9 MIU/ML

## 2022-09-30 PROCEDURE — 3044F HG A1C LEVEL LT 7.0%: CPT | Mod: CPTII,S$GLB,, | Performed by: OBSTETRICS & GYNECOLOGY

## 2022-09-30 PROCEDURE — 83002 ASSAY OF GONADOTROPIN (LH): CPT | Performed by: OBSTETRICS & GYNECOLOGY

## 2022-09-30 PROCEDURE — 3079F PR MOST RECENT DIASTOLIC BLOOD PRESSURE 80-89 MM HG: ICD-10-PCS | Mod: CPTII,S$GLB,, | Performed by: OBSTETRICS & GYNECOLOGY

## 2022-09-30 PROCEDURE — 82040 ASSAY OF SERUM ALBUMIN: CPT | Performed by: OBSTETRICS & GYNECOLOGY

## 2022-09-30 PROCEDURE — 3079F DIAST BP 80-89 MM HG: CPT | Mod: CPTII,S$GLB,, | Performed by: OBSTETRICS & GYNECOLOGY

## 2022-09-30 PROCEDURE — 99386 PREV VISIT NEW AGE 40-64: CPT | Mod: S$GLB,,, | Performed by: OBSTETRICS & GYNECOLOGY

## 2022-09-30 PROCEDURE — 88175 CYTOPATH C/V AUTO FLUID REDO: CPT | Performed by: OBSTETRICS & GYNECOLOGY

## 2022-09-30 PROCEDURE — 76856 US EXAM PELVIC COMPLETE: CPT | Mod: 26,,, | Performed by: RADIOLOGY

## 2022-09-30 PROCEDURE — 3044F PR MOST RECENT HEMOGLOBIN A1C LEVEL <7.0%: ICD-10-PCS | Mod: CPTII,S$GLB,, | Performed by: OBSTETRICS & GYNECOLOGY

## 2022-09-30 PROCEDURE — 76830 TRANSVAGINAL US NON-OB: CPT | Mod: 26,,, | Performed by: RADIOLOGY

## 2022-09-30 PROCEDURE — 82670 ASSAY OF TOTAL ESTRADIOL: CPT | Performed by: OBSTETRICS & GYNECOLOGY

## 2022-09-30 PROCEDURE — 3008F PR BODY MASS INDEX (BMI) DOCUMENTED: ICD-10-PCS | Mod: CPTII,S$GLB,, | Performed by: OBSTETRICS & GYNECOLOGY

## 2022-09-30 PROCEDURE — 3074F SYST BP LT 130 MM HG: CPT | Mod: CPTII,S$GLB,, | Performed by: OBSTETRICS & GYNECOLOGY

## 2022-09-30 PROCEDURE — 3008F BODY MASS INDEX DOCD: CPT | Mod: CPTII,S$GLB,, | Performed by: OBSTETRICS & GYNECOLOGY

## 2022-09-30 PROCEDURE — 1159F PR MEDICATION LIST DOCUMENTED IN MEDICAL RECORD: ICD-10-PCS | Mod: CPTII,S$GLB,, | Performed by: OBSTETRICS & GYNECOLOGY

## 2022-09-30 PROCEDURE — 83001 ASSAY OF GONADOTROPIN (FSH): CPT | Performed by: OBSTETRICS & GYNECOLOGY

## 2022-09-30 PROCEDURE — 76830 TRANSVAGINAL US NON-OB: CPT | Mod: TC,PO

## 2022-09-30 PROCEDURE — 99386 PR PREVENTIVE VISIT,NEW,40-64: ICD-10-PCS | Mod: S$GLB,,, | Performed by: OBSTETRICS & GYNECOLOGY

## 2022-09-30 PROCEDURE — 76856 US PELVIS COMP WITH TRANSVAG NON-OB (XPD): ICD-10-PCS | Mod: 26,,, | Performed by: RADIOLOGY

## 2022-09-30 PROCEDURE — 76830 US PELVIS COMP WITH TRANSVAG NON-OB (XPD): ICD-10-PCS | Mod: 26,,, | Performed by: RADIOLOGY

## 2022-09-30 PROCEDURE — 1159F MED LIST DOCD IN RCRD: CPT | Mod: CPTII,S$GLB,, | Performed by: OBSTETRICS & GYNECOLOGY

## 2022-09-30 PROCEDURE — 3074F PR MOST RECENT SYSTOLIC BLOOD PRESSURE < 130 MM HG: ICD-10-PCS | Mod: CPTII,S$GLB,, | Performed by: OBSTETRICS & GYNECOLOGY

## 2022-09-30 PROCEDURE — 87624 HPV HI-RISK TYP POOLED RSLT: CPT | Performed by: OBSTETRICS & GYNECOLOGY

## 2022-09-30 PROCEDURE — 99999 PR PBB SHADOW E&M-EST. PATIENT-LVL III: ICD-10-PCS | Mod: PBBFAC,,, | Performed by: OBSTETRICS & GYNECOLOGY

## 2022-09-30 PROCEDURE — 99999 PR PBB SHADOW E&M-EST. PATIENT-LVL III: CPT | Mod: PBBFAC,,, | Performed by: OBSTETRICS & GYNECOLOGY

## 2022-09-30 NOTE — PROGRESS NOTES
Chief Complaint   Patient presents with    Annual Exam       History and Physical:  Patient's last menstrual period was 2022 (approximate).    Contraception:  Bilateral tubal ligation    Uma Bangura is a 42 y.o.  who presents today for her routine annual GYN exam.   The patient reports a history of endometrial ablation for abnormal uterine bleeding.  She now has some irregular cycles and was recently diagnosed with iron deficiency anemia.    She had a recent mammogram   No family history of colon cancers.  She does report some recent hair loss issues.    Allergies:   Review of patient's allergies indicates:   Allergen Reactions    Sertraline      Other reaction(s): heartburn       Past Medical History:   Diagnosis Date    Anxiety state, unspecified     Disturbance of salivary secretion     Edema     GERD (gastroesophageal reflux disease)     Goiter, unspecified     Nonspecific elevation of levels of transaminase or lactic acid dehydrogenase (LDH)     Polyphagia(783.6)     Unspecified sleep apnea     Varices of other sites        Past Surgical History:   Procedure Laterality Date     SECTION      x 2    ENDOMETRIAL ABLATION      OOPHORECTOMY Left     Dermoid cyst    TUBAL LIGATION         MEDS:   Current Outpatient Medications:     cycloSPORINE (RESTASIS) 0.05 % ophthalmic emulsion, Place 1 drop into both eyes 2 (two) times daily., Disp: , Rfl:     ferrous sulfate 325 (65 FE) MG EC tablet, Take 1 tablet (325 mg total) by mouth once daily., Disp: 90 tablet, Rfl: 1     OB History          2    Para   2    Term   2            AB        Living   2         SAB        IAB        Ectopic        Multiple        Live Births   2                 Social History     Socioeconomic History    Marital status:    Tobacco Use    Smoking status: Never    Smokeless tobacco: Never   Substance and Sexual Activity    Alcohol use: Yes     Comment: occasionally    Drug use: No    Sexual  "activity: Yes     Birth control/protection: See Surgical Hx     Comment:        Family History   Problem Relation Age of Onset    Cancer Mother         thyroid    Hypertension Mother     Diabetes Father     Hypertension Father          Past medical and surgical history reviewed.   I have reviewed the patient's medical history in detail and updated the computerized patient record.      Review of Systems (at today's evaluation)  Review of Systems   Constitutional:  Negative for chills, fever and unexpected weight change.   HENT:  Negative for congestion, ear pain, sinus pain and sore throat.    Eyes: Negative.    Respiratory:  Negative for cough and shortness of breath.    Cardiovascular:  Negative for chest pain.   Gastrointestinal:  Negative for abdominal pain, constipation, diarrhea, nausea and vomiting.   Endocrine: Negative.    Genitourinary:  Negative for dysuria, frequency, hematuria and urgency.        Gyn as per HPI   Musculoskeletal:  Negative for arthralgias.   Skin:  Negative for rash.   Neurological:  Negative for syncope, weakness and headaches.   Psychiatric/Behavioral:  The patient is not nervous/anxious.       Physical Exam:   /82 (BP Location: Right arm, Patient Position: Sitting, BP Method: Large (Manual))   Resp 16   Ht 5' 6" (1.676 m)   Wt 104.8 kg (230 lb 14.9 oz)   LMP 08/30/2022 (Approximate)   BMI 37.27 kg/m²       Physical Exam:   Constitutional: She appears well-developed and well-nourished.    HENT:   Head: Normocephalic.     Neck: No thyroid mass present.    Cardiovascular:  Normal rate, regular rhythm and normal heart sounds.             Pulmonary/Chest: Effort normal and breath sounds normal. Right breast exhibits no mass, no nipple discharge and no skin change. Left breast exhibits no mass, no nipple discharge and no skin change.        Abdominal: Soft. There is no abdominal tenderness.     Genitourinary:    Inguinal canal, vagina, uterus, right adnexa and left adnexa " normal.      Pelvic exam was performed with patient supine.   The external female genitalia was normal.   No external genitalia lesions identified,Cervix is normal. Right adnexum displays no mass and no tenderness. Left adnexum displays no mass and no tenderness. No tenderness or bleeding in the vagina. Uterus is not tender. Normal urethral meatus.Urethra findings: no tendernessBladder findings: no bladder tenderness          Musculoskeletal:      Right lower leg: No edema.      Left lower leg: No edema.      Lymphadenopathy:     She has no cervical adenopathy. No inguinal adenopathy noted on the right or left side.    Neurological: She is alert.   No gross defects noted    Skin: Skin is warm and dry.    Psychiatric: Mood normal.      Assessment:   The primary encounter diagnosis was Encounter for gynecological examination without abnormal finding. Diagnoses of Screening for malignant neoplasm of cervix, Abnormal uterine bleeding (AUB), Hair loss, and Iron deficiency anemia due to chronic blood loss were also pertinent to this visit.       Plan:   Encounter for gynecological examination without abnormal finding  -     Ambulatory referral/consult to Gynecology    Screening for malignant neoplasm of cervix  -     HPV High Risk Genotypes, PCR  -     Liquid-Based Pap Smear, Screening    Abnormal uterine bleeding (AUB)  -     Luteinizing Hormone; Future; Expected date: 09/30/2022  -     Follicle Stimulating Hormone; Future; Expected date: 09/30/2022  -     Estradiol; Future; Expected date: 09/30/2022  -     US Pelvis Comp with Transvag NON-OB (xpd; Future; Expected date: 09/30/2022    Hair loss  -     Testosterone Panel; Future; Expected date: 09/30/2022    Iron deficiency anemia due to chronic blood loss  -     Fecal Immunochemical Test (iFOBT); Future; Expected date: 09/30/2022     No follow-ups on file.     The above was reviewed and discussed with the patient.    Annual exam and screening issues based on the  patient's age, medical history and family history were reviewed / discussed.    We discussed the patient's history of endometrial ablation as well as her current abnormal bleeding and anemia.    At this time will proceed as follows:  LH FSH and estrogen have been ordered as well as a pelvic ultrasound to evaluate for the bleeding issues  A testosterone panel has been ordered to evaluate the hair loss.    Given the patient's history of ablation and her iron deficiency anemia a fit test was ordered to evaluate for potential but low likely GI bleeding.    The patient's questions were answered, and she is in agreement with the current plan.

## 2022-10-06 ENCOUNTER — LAB VISIT (OUTPATIENT)
Dept: LAB | Facility: HOSPITAL | Age: 42
End: 2022-10-06
Attending: OBSTETRICS & GYNECOLOGY
Payer: COMMERCIAL

## 2022-10-06 DIAGNOSIS — D50.0 IRON DEFICIENCY ANEMIA DUE TO CHRONIC BLOOD LOSS: ICD-10-CM

## 2022-10-06 LAB
HPV HR 12 DNA SPEC QL NAA+PROBE: NEGATIVE
HPV16 AG SPEC QL: NEGATIVE
HPV18 DNA SPEC QL NAA+PROBE: NEGATIVE

## 2022-10-06 PROCEDURE — 82274 ASSAY TEST FOR BLOOD FECAL: CPT | Performed by: OBSTETRICS & GYNECOLOGY

## 2022-10-10 LAB
ALBUMIN SERPL-MCNC: 4.2 G/DL (ref 3.6–5.1)
FINAL PATHOLOGIC DIAGNOSIS: NORMAL
Lab: NORMAL
SHBG SERPL-SCNC: 35 NMOL/L (ref 17–124)
TESTOST FREE SERPL-MCNC: 1.6 PG/ML (ref 0.2–5)
TESTOST SERPL-MCNC: 14 NG/DL (ref 2–45)
TESTOSTERONE.FREE+WB SERPL-MCNC: 3 NG/DL (ref 0.5–8.5)

## 2022-10-13 LAB — HEMOCCULT STL QL IA: NEGATIVE

## 2022-10-20 ENCOUNTER — OFFICE VISIT (OUTPATIENT)
Dept: OBSTETRICS AND GYNECOLOGY | Facility: CLINIC | Age: 42
End: 2022-10-20
Payer: COMMERCIAL

## 2022-10-20 VITALS
DIASTOLIC BLOOD PRESSURE: 72 MMHG | BODY MASS INDEX: 38.12 KG/M2 | HEIGHT: 66 IN | WEIGHT: 237.19 LBS | SYSTOLIC BLOOD PRESSURE: 120 MMHG

## 2022-10-20 DIAGNOSIS — N93.9 ABNORMAL UTERINE BLEEDING (AUB): Primary | ICD-10-CM

## 2022-10-20 DIAGNOSIS — D50.0 IRON DEFICIENCY ANEMIA DUE TO CHRONIC BLOOD LOSS: ICD-10-CM

## 2022-10-20 PROCEDURE — 3074F PR MOST RECENT SYSTOLIC BLOOD PRESSURE < 130 MM HG: ICD-10-PCS | Mod: CPTII,S$GLB,, | Performed by: OBSTETRICS & GYNECOLOGY

## 2022-10-20 PROCEDURE — 99213 PR OFFICE/OUTPT VISIT, EST, LEVL III, 20-29 MIN: ICD-10-PCS | Mod: S$GLB,,, | Performed by: OBSTETRICS & GYNECOLOGY

## 2022-10-20 PROCEDURE — 1159F PR MEDICATION LIST DOCUMENTED IN MEDICAL RECORD: ICD-10-PCS | Mod: CPTII,S$GLB,, | Performed by: OBSTETRICS & GYNECOLOGY

## 2022-10-20 PROCEDURE — 99213 OFFICE O/P EST LOW 20 MIN: CPT | Mod: S$GLB,,, | Performed by: OBSTETRICS & GYNECOLOGY

## 2022-10-20 PROCEDURE — 99999 PR PBB SHADOW E&M-EST. PATIENT-LVL III: CPT | Mod: PBBFAC,,, | Performed by: OBSTETRICS & GYNECOLOGY

## 2022-10-20 PROCEDURE — 1159F MED LIST DOCD IN RCRD: CPT | Mod: CPTII,S$GLB,, | Performed by: OBSTETRICS & GYNECOLOGY

## 2022-10-20 PROCEDURE — 3044F PR MOST RECENT HEMOGLOBIN A1C LEVEL <7.0%: ICD-10-PCS | Mod: CPTII,S$GLB,, | Performed by: OBSTETRICS & GYNECOLOGY

## 2022-10-20 PROCEDURE — 3078F PR MOST RECENT DIASTOLIC BLOOD PRESSURE < 80 MM HG: ICD-10-PCS | Mod: CPTII,S$GLB,, | Performed by: OBSTETRICS & GYNECOLOGY

## 2022-10-20 PROCEDURE — 3078F DIAST BP <80 MM HG: CPT | Mod: CPTII,S$GLB,, | Performed by: OBSTETRICS & GYNECOLOGY

## 2022-10-20 PROCEDURE — 99999 PR PBB SHADOW E&M-EST. PATIENT-LVL III: ICD-10-PCS | Mod: PBBFAC,,, | Performed by: OBSTETRICS & GYNECOLOGY

## 2022-10-20 PROCEDURE — 3074F SYST BP LT 130 MM HG: CPT | Mod: CPTII,S$GLB,, | Performed by: OBSTETRICS & GYNECOLOGY

## 2022-10-20 PROCEDURE — 3044F HG A1C LEVEL LT 7.0%: CPT | Mod: CPTII,S$GLB,, | Performed by: OBSTETRICS & GYNECOLOGY

## 2022-10-20 NOTE — PROGRESS NOTES
Chief Complaint   Patient presents with    Follow-up     Labs and u/s       History and Physical:  Patient's last menstrual period was 2022 (approximate).    Contraception:  Bilateral tubal ligation    Uma Bangura is a 42 y.o.  who presents today for her routine annual GYN exam.   The patient reports a history of endometrial ablation for abnormal uterine bleeding.  She now has some irregular cycles and was recently diagnosed with iron deficiency anemia.    She had a recent mammogram   No family history of colon cancers.  She does report some recent hair loss issues.    Date: 10/20/2022    The patient presents today for follow-up.  She was last seen as above.  In light of the above and her anemia lab work and pelvic ultrasound were ordered.    The patient presents today to discuss results of the above   She reports no bleeding since our last evaluation.      Allergies:   Review of patient's allergies indicates:   Allergen Reactions    Sertraline      Other reaction(s): heartburn       Past Medical History:   Diagnosis Date    Anxiety state, unspecified     Disturbance of salivary secretion     Edema     GERD (gastroesophageal reflux disease)     Goiter, unspecified     Nonspecific elevation of levels of transaminase or lactic acid dehydrogenase (LDH)     Polyphagia(783.6)     Unspecified sleep apnea     Varices of other sites        Past Surgical History:   Procedure Laterality Date     SECTION      x 2    ENDOMETRIAL ABLATION      OOPHORECTOMY Left     Dermoid cyst    TUBAL LIGATION         MEDS:   Current Outpatient Medications:     cycloSPORINE (RESTASIS) 0.05 % ophthalmic emulsion, Place 1 drop into both eyes 2 (two) times daily., Disp: , Rfl:     ferrous sulfate 325 (65 FE) MG EC tablet, Take 1 tablet (325 mg total) by mouth once daily., Disp: 90 tablet, Rfl: 1     OB History          2    Para   2    Term   2            AB        Living   2         SAB        IAB      "   Ectopic        Multiple        Live Births   2           Obstetric Comments   Caesarean section x2               Social History     Socioeconomic History    Marital status:    Tobacco Use    Smoking status: Never    Smokeless tobacco: Never   Substance and Sexual Activity    Alcohol use: Yes     Comment: occasionally    Drug use: No    Sexual activity: Yes     Birth control/protection: See Surgical Hx     Comment:        Family History   Problem Relation Age of Onset    Cancer Mother         thyroid    Hypertension Mother     Diabetes Father     Hypertension Father          Past medical and surgical history reviewed.   I have reviewed the patient's medical history in detail and updated the computerized patient record.      Review of Systems (at today's evaluation)  Review of Systems   Constitutional:  Negative for fever and unexpected weight change.   Respiratory:  Negative for cough and shortness of breath.    Cardiovascular:  Negative for chest pain.   Gastrointestinal:  Negative for constipation, diarrhea and nausea.   Genitourinary:  Negative for dysuria and frequency.          GYN AS PER HPI   Musculoskeletal: Negative.    Skin: Negative.    Neurological: Negative.       Physical Exam:   /72 (BP Location: Right arm, Patient Position: Sitting, BP Method: Large (Manual))   Ht 5' 6" (1.676 m)   Wt 107.6 kg (237 lb 3.4 oz)   LMP 08/11/2022 (Approximate)   BMI 38.29 kg/m²       Physical Exam:   Constitutional: She appears well-developed and well-nourished.    HENT:   Head: Normocephalic.     Neck: No thyroid mass and no thyromegaly present.    Cardiovascular:  Normal rate.             Pulmonary/Chest: Effort normal. No respiratory distress.        Abdominal: Soft. There is no abdominal tenderness.             Musculoskeletal: Normal range of motion.      Right lower leg: No edema.      Left lower leg: No edema.       Neurological: She is alert.    Skin: Skin is warm and dry.  "   Psychiatric: She has a normal mood and affect. Mood normal.        Recent Laboratory Results:    Pap smear from 09/30/2022 noted no cervical abnormalities and was HPV negative     Results    Collected Updated Procedure    10/13/2022 1044 10/13/2022 1227 Fecal Immunochemical Test (iFOBT) [[id number]]   Stool    Component Value   Fecal Immunochemical Test (iFOBT) Negative          09/30/2022 1054 09/30/2022 2037 Estradiol [[id number]]   Blood    Component Value Units   Estradiol 133  pg/mL          09/30/2022 1054 10/10/2022 1839 Testosterone Panel [[id number]]   Blood    Component Value Units   Testosterone 14  ng/dL   Testosterone, Free 1.6 pg/mL   Testosterone, Bioavailable 3.0 ng/dL   Sex Hormone Binding Globulin 35 nmol/L   Albumin 4.2 g/dL          09/30/2022 1054 09/30/2022 2037 Follicle Stimulating Hormone [[id number]]   Blood    Component Value Units   Follicle Stimulating Hormone 21.12  mIU/mL          09/30/2022 1054 09/30/2022 2037 Luteinizing Hormone [[id number]]   Blood    Component Value Units   LH 21.9            Recent Radiology Results:    9/30/2022 Routine     Narrative & Impression  EXAMINATION:  US PELVIS COMP WITH TRANSVAG NON-OB (XPD)     CLINICAL HISTORY:  Thickened endometrial cavity on previous ultrasound; Abnormal uterine and vaginal bleeding, unspecified     TECHNIQUE:  Transabdominal sonography of the pelvis was performed, followed by transvaginal sonography to better evaluate the uterus and ovaries.     COMPARISON:  None.     FINDINGS:  Uterus:     Size: 8 x 4 x 5 cm     Masses: No uterine masses.  Anterior Caesarean section defect.     Endometrium: Normal in this pre menopausal patient, measuring 4 mm.     Ovaries:     Only 1 ovary is seen, position midline posterior to the uterine fundus.  This measures 3 x 2 x 3 cm and contains 2 anechoic lesions measuring 2 and 1.4 cm.  There is normal arterial and venous flow within this ovary.     Free Fluid:     Small volume, posterior cul-de-sac.      Impression:     1. Unremarkable uterus and endometrium.  2. Only one ovary is seen, containing a few cysts or dominant follicles.  Per chart review there is a history of unilateral oophorectomy.     Electronically signed by: Navid Plaza  Date:                                            09/30/2022  Time:                                           13:32      Assessment:   The primary encounter diagnosis was Abnormal uterine bleeding (AUB). A diagnosis of Iron deficiency anemia due to chronic blood loss was also pertinent to this visit.       Plan:   Abnormal uterine bleeding (AUB)    Iron deficiency anemia due to chronic blood loss  -     Ambulatory referral/consult to Hematology / Oncology; Future; Expected date: 10/27/2022       Follow up for as needed / for any GYN related issues / after evaluation by Hematology.     The above was reviewed and discussed with the patient.    Findings on ultrasound were discussed as well as laboratory evaluation.  Based on the patient's history, findings on physical exam and radiologic studies it is unclear as to whether her anemia is related to abnormal uterine bleeding as she does not feel the amount of bleeding is significant.  In light of this issue prior to any gynecologic intervention such as hysterectomy the patient has been referred to Hematology to evaluate for potential underlying causes for anemia.  She does report that her mother is currently being worked up for anemia of unknown etiology.    The patient's questions regarding the above were answered and she is in agreement with this plan.

## 2022-10-21 ENCOUNTER — TELEPHONE (OUTPATIENT)
Dept: HEMATOLOGY/ONCOLOGY | Facility: CLINIC | Age: 42
End: 2022-10-21
Payer: COMMERCIAL

## 2022-10-21 NOTE — NURSING
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 Valiente)  First Available Date vs. Scheduled Date (days): 0     Treatment                              Acuity      Follow Up  No follow-ups on file.

## 2022-10-26 ENCOUNTER — TELEPHONE (OUTPATIENT)
Dept: HEMATOLOGY/ONCOLOGY | Facility: CLINIC | Age: 42
End: 2022-10-26

## 2022-10-26 ENCOUNTER — OFFICE VISIT (OUTPATIENT)
Dept: HEMATOLOGY/ONCOLOGY | Facility: CLINIC | Age: 42
End: 2022-10-26
Payer: COMMERCIAL

## 2022-10-26 VITALS
TEMPERATURE: 98 F | HEART RATE: 75 BPM | DIASTOLIC BLOOD PRESSURE: 80 MMHG | RESPIRATION RATE: 14 BRPM | OXYGEN SATURATION: 99 % | HEIGHT: 66 IN | BODY MASS INDEX: 37.91 KG/M2 | SYSTOLIC BLOOD PRESSURE: 118 MMHG | WEIGHT: 235.88 LBS

## 2022-10-26 DIAGNOSIS — E61.1 IRON DEFICIENCY: Primary | ICD-10-CM

## 2022-10-26 DIAGNOSIS — D50.0 IRON DEFICIENCY ANEMIA DUE TO CHRONIC BLOOD LOSS: ICD-10-CM

## 2022-10-26 PROCEDURE — 3079F DIAST BP 80-89 MM HG: CPT | Mod: CPTII,S$GLB,, | Performed by: INTERNAL MEDICINE

## 2022-10-26 PROCEDURE — 99204 PR OFFICE/OUTPT VISIT, NEW, LEVL IV, 45-59 MIN: ICD-10-PCS | Mod: S$GLB,,, | Performed by: INTERNAL MEDICINE

## 2022-10-26 PROCEDURE — 3074F SYST BP LT 130 MM HG: CPT | Mod: CPTII,S$GLB,, | Performed by: INTERNAL MEDICINE

## 2022-10-26 PROCEDURE — 99999 PR PBB SHADOW E&M-EST. PATIENT-LVL III: ICD-10-PCS | Mod: PBBFAC,,, | Performed by: INTERNAL MEDICINE

## 2022-10-26 PROCEDURE — 1159F MED LIST DOCD IN RCRD: CPT | Mod: CPTII,S$GLB,, | Performed by: INTERNAL MEDICINE

## 2022-10-26 PROCEDURE — 1159F PR MEDICATION LIST DOCUMENTED IN MEDICAL RECORD: ICD-10-PCS | Mod: CPTII,S$GLB,, | Performed by: INTERNAL MEDICINE

## 2022-10-26 PROCEDURE — 99204 OFFICE O/P NEW MOD 45 MIN: CPT | Mod: S$GLB,,, | Performed by: INTERNAL MEDICINE

## 2022-10-26 PROCEDURE — 3044F PR MOST RECENT HEMOGLOBIN A1C LEVEL <7.0%: ICD-10-PCS | Mod: CPTII,S$GLB,, | Performed by: INTERNAL MEDICINE

## 2022-10-26 PROCEDURE — 3044F HG A1C LEVEL LT 7.0%: CPT | Mod: CPTII,S$GLB,, | Performed by: INTERNAL MEDICINE

## 2022-10-26 PROCEDURE — 99999 PR PBB SHADOW E&M-EST. PATIENT-LVL III: CPT | Mod: PBBFAC,,, | Performed by: INTERNAL MEDICINE

## 2022-10-26 PROCEDURE — 3074F PR MOST RECENT SYSTOLIC BLOOD PRESSURE < 130 MM HG: ICD-10-PCS | Mod: CPTII,S$GLB,, | Performed by: INTERNAL MEDICINE

## 2022-10-26 PROCEDURE — 3079F PR MOST RECENT DIASTOLIC BLOOD PRESSURE 80-89 MM HG: ICD-10-PCS | Mod: CPTII,S$GLB,, | Performed by: INTERNAL MEDICINE

## 2022-10-26 RX ORDER — MAGNESIUM GLUCONATE 27 MG(500)
27 TABLET ORAL ONCE
COMMUNITY

## 2022-10-26 NOTE — TELEPHONE ENCOUNTER
Scheduled patient in 4 weeks per patient schedule.     ----- Message from Bogdan Valiente MD sent at 10/26/2022  3:29 PM CDT -----  RTC 3 weeks with lab

## 2022-10-26 NOTE — PROGRESS NOTES
HPI    Patient is referred to Hematology Clinic for evaluation of iron deficiency anemia.    Patient does have history of heavy menstrual cycle however after ablation her dysfunctional uterine bleeding has been resolved.  Patient also has history of donor blood.  Last donation approximately 4 months ago.  Patient was told that she has iron deficiency so therefore would not recommend any further donations.    Denies any GI issues.  Denies any overt bleeding.      Past Medical History:   Diagnosis Date    Anxiety state, unspecified     Disturbance of salivary secretion     Edema     GERD (gastroesophageal reflux disease)     Goiter, unspecified     Nonspecific elevation of levels of transaminase or lactic acid dehydrogenase (LDH)     Polyphagia(783.6)     Unspecified sleep apnea     Varices of other sites      Social History     Socioeconomic History    Marital status:    Tobacco Use    Smoking status: Never    Smokeless tobacco: Never   Substance and Sexual Activity    Alcohol use: Yes     Comment: occasionally    Drug use: No    Sexual activity: Yes     Birth control/protection: See Surgical Hx     Comment:          Subjective      Review of Systems   Constitutional: Negative for appetite change, fatigue and unexpected weight change.   HENT: Negative for mouth sores.   Eyes: Negative for visual disturbance.   Respiratory: Negative for cough and shortness of breath.   Cardiovascular: Negative for chest pain.   Gastrointestinal: Negative for diarrhea.   Genitourinary: Negative for frequency.   Musculoskeletal: Negative for back pain.   Skin: Negative for rash.   Neurological: Negative for headaches.   Hematological: Negative for adenopathy.   Psychiatric/Behavioral: The patient is not nervous/anxious.   All other systems reviewed and are negative.     Objective    Physical Exam   Vitals:    10/26/22 1503   BP: 118/80   Pulse: 75   Resp: 14   Temp: 98.2 °F (36.8 °C)       Constitutional: patient is  oriented to person, place, and time. patient appears well-developed and well-nourished. No distress.   HENT:   Right Ear: External ear normal.   Left Ear: External ear normal.   Nose: Nose normal.   Mouth/Throat: Oropharynx is clear and moist. No oropharyngeal exudate.   Teeth, gums and lips are normal   No sinus tenderness   Palate, tongue, posterior pharynx are normal   Eyes: Conjunctivae and lids are normal.   Neck: Trachea normal and normal range of motion. No thyromegaly   Cardiovascular: Normal rate, regular rhythm, normal heart sounds, intact distal pulses and normal pulses.   No murmur heard.   No edema, no tenderness in the extremities.   Pulmonary/Chest: Effort normal and breath sounds normal. No accessory muscle usage. patient has no wheezes..   Abdominal: Soft. Normal appearance and bowel sounds are normal. patient exhibits no distension and no mass. There is no hepatosplenomegaly. There is no tenderness.   Musculoskeletal: Normal range of motion.   Gait is normal   No clubbing, cyanosis     Lymphadenopathy:   Head (right side): No submental and no submandibular adenopathy present.   Head (left side): No submental and no submandibular adenopathy present.   patient has no cervical adenopathy.   Right: No supraclavicular adenopathy present.   Left: No supraclavicular adenopathy present.   Neurological: patient is alert and oriented to person, place, and time. patient has normal strength and normal reflexes. No sensory deficit. Gait normal.   Skin: Skin is warm, dry and intact. No bruising, no lesion and no rash noted. No cyanosis. Nails show no clubbing.   No lesions   Psychiatric: patient has a normal mood and affect. patient speech is normal and behavior is normal. Judgment normal. Cognition and memory are normal.   Vitals reviewed.     Lab Results   Component Value Date    WBC 5.82 09/10/2022    HGB 11.6 (L) 09/10/2022    HCT 36.8 (L) 09/10/2022    MCV 81 (L) 09/10/2022     09/10/2022        CMP  Sodium   Date Value Ref Range Status   07/26/2022 138 136 - 145 mmol/L Final   07/02/2015 140 137 - 145 MMOL/L Final     Potassium   Date Value Ref Range Status   07/26/2022 4.1 3.5 - 5.1 mmol/L Final   07/02/2015 4.5 3.5 - 5.1 MMOL/L Final     Chloride   Date Value Ref Range Status   07/26/2022 104 95 - 110 mmol/L Final   07/02/2015 101 98 - 107 MMOL/L Final     CO2   Date Value Ref Range Status   07/26/2022 23 23 - 29 mmol/L Final     Glucose   Date Value Ref Range Status   07/26/2022 78 70 - 110 mg/dL Final     BUN   Date Value Ref Range Status   07/26/2022 13 6 - 20 mg/dL Final     Creatinine   Date Value Ref Range Status   07/26/2022 0.9 0.5 - 1.4 mg/dL Final   07/02/2015 0.92 0.52 - 1.04 MG/DL Final     Calcium   Date Value Ref Range Status   07/26/2022 9.4 8.7 - 10.5 mg/dL Final     Total Protein   Date Value Ref Range Status   07/26/2022 6.7 6.0 - 8.4 g/dL Final     Albumin   Date Value Ref Range Status   09/30/2022 4.2 3.6 - 5.1 g/dL Final     Total Bilirubin   Date Value Ref Range Status   07/26/2022 0.4 0.1 - 1.0 mg/dL Final     Comment:     For infants and newborns, interpretation of results should be based  on gestational age, weight and in agreement with clinical  observations.    Premature Infant recommended reference ranges:  Up to 24 hours.............<8.0 mg/dL  Up to 48 hours............<12.0 mg/dL  3-5 days..................<15.0 mg/dL  6-29 days.................<15.0 mg/dL       Alkaline Phosphatase   Date Value Ref Range Status   07/26/2022 77 55 - 135 U/L Final     AST ([place])   Date Value Ref Range Status   02/10/2016 32 14 - 36 U/L Final     AST   Date Value Ref Range Status   07/26/2022 19 10 - 40 U/L Final     ALT   Date Value Ref Range Status   07/26/2022 17 10 - 44 U/L Final     Anion Gap   Date Value Ref Range Status   07/26/2022 11 8 - 16 mmol/L Final     eGFR if    Date Value Ref Range Status   07/26/2022 >60.0 >60 mL/min/1.73 m^2 Final     eGFR if  non    Date Value Ref Range Status   07/26/2022 >60.0 >60 mL/min/1.73 m^2 Final     Comment:     Calculation used to obtain the estimated glomerular filtration  rate (eGFR) is the CKD-EPI equation.            Assessment      Iron deficiency anemia    Patient has history of blood donor.  Stopped about 4 months ago due to anemia.    History of heavy menstrual cycle had ablation now has been resolved.    Plan    Continue oral iron supplement once daily on empty stomach.  Patient has been on oral iron iron supplement for the past 5 weeks.  I will recheck iron panel in 3 weeks.  CBC iron panel and ferritin studies.    Iron deficiency anemia due to chronic blood loss  -     Ambulatory referral/consult to Hematology / Oncology

## 2022-11-19 ENCOUNTER — LAB VISIT (OUTPATIENT)
Dept: LAB | Facility: HOSPITAL | Age: 42
End: 2022-11-19
Attending: OBSTETRICS & GYNECOLOGY
Payer: COMMERCIAL

## 2022-11-19 DIAGNOSIS — D50.0 IRON DEFICIENCY ANEMIA DUE TO CHRONIC BLOOD LOSS: ICD-10-CM

## 2022-11-19 DIAGNOSIS — E61.1 IRON DEFICIENCY: ICD-10-CM

## 2022-11-19 LAB
BASOPHILS # BLD AUTO: 0.05 K/UL (ref 0–0.2)
BASOPHILS NFR BLD: 0.9 % (ref 0–1.9)
DIFFERENTIAL METHOD: ABNORMAL
EOSINOPHIL # BLD AUTO: 0.1 K/UL (ref 0–0.5)
EOSINOPHIL NFR BLD: 1.3 % (ref 0–8)
ERYTHROCYTE [DISTWIDTH] IN BLOOD BY AUTOMATED COUNT: 16.6 % (ref 11.5–14.5)
FERRITIN SERPL-MCNC: 21 NG/ML (ref 20–300)
HCT VFR BLD AUTO: 39.9 % (ref 37–48.5)
HGB BLD-MCNC: 12.8 G/DL (ref 12–16)
IMM GRANULOCYTES # BLD AUTO: 0.04 K/UL (ref 0–0.04)
IMM GRANULOCYTES NFR BLD AUTO: 0.7 % (ref 0–0.5)
IRON SERPL-MCNC: 87 UG/DL (ref 30–160)
LYMPHOCYTES # BLD AUTO: 2.5 K/UL (ref 1–4.8)
LYMPHOCYTES NFR BLD: 44.7 % (ref 18–48)
MCH RBC QN AUTO: 25.7 PG (ref 27–31)
MCHC RBC AUTO-ENTMCNC: 32.1 G/DL (ref 32–36)
MCV RBC AUTO: 80 FL (ref 82–98)
MONOCYTES # BLD AUTO: 0.4 K/UL (ref 0.3–1)
MONOCYTES NFR BLD: 6.3 % (ref 4–15)
NEUTROPHILS # BLD AUTO: 2.6 K/UL (ref 1.8–7.7)
NEUTROPHILS NFR BLD: 46.1 % (ref 38–73)
NRBC BLD-RTO: 0 /100 WBC
PLATELET # BLD AUTO: 242 K/UL (ref 150–450)
PMV BLD AUTO: 10.5 FL (ref 9.2–12.9)
RBC # BLD AUTO: 4.98 M/UL (ref 4–5.4)
SATURATED IRON: 21 % (ref 20–50)
TOTAL IRON BINDING CAPACITY: 407 UG/DL (ref 250–450)
TRANSFERRIN SERPL-MCNC: 275 MG/DL (ref 200–375)
WBC # BLD AUTO: 5.59 K/UL (ref 3.9–12.7)

## 2022-11-19 PROCEDURE — 84466 ASSAY OF TRANSFERRIN: CPT | Performed by: INTERNAL MEDICINE

## 2022-11-19 PROCEDURE — 85025 COMPLETE CBC W/AUTO DIFF WBC: CPT | Mod: PO | Performed by: INTERNAL MEDICINE

## 2022-11-19 PROCEDURE — 82728 ASSAY OF FERRITIN: CPT | Performed by: INTERNAL MEDICINE

## 2022-11-19 PROCEDURE — 36415 COLL VENOUS BLD VENIPUNCTURE: CPT | Mod: PO | Performed by: INTERNAL MEDICINE

## 2022-12-09 ENCOUNTER — HOSPITAL ENCOUNTER (EMERGENCY)
Facility: HOSPITAL | Age: 42
Discharge: HOME | End: 2022-12-09
Attending: EMERGENCY MEDICINE
Payer: COMMERCIAL

## 2022-12-09 VITALS
HEART RATE: 73 BPM | WEIGHT: 230 LBS | TEMPERATURE: 99 F | OXYGEN SATURATION: 100 % | HEIGHT: 66 IN | RESPIRATION RATE: 16 BRPM | SYSTOLIC BLOOD PRESSURE: 122 MMHG | DIASTOLIC BLOOD PRESSURE: 77 MMHG | BODY MASS INDEX: 36.96 KG/M2

## 2022-12-09 DIAGNOSIS — R51.9 ACUTE NONINTRACTABLE HEADACHE, UNSPECIFIED HEADACHE TYPE: Primary | ICD-10-CM

## 2022-12-09 LAB
B-HCG UR QL: NEGATIVE
POCT GLUCOSE: 86 MG/DL (ref 70–110)

## 2022-12-09 PROCEDURE — 63600175 PHARM REV CODE 636 W HCPCS: Performed by: EMERGENCY MEDICINE

## 2022-12-09 PROCEDURE — 96375 TX/PRO/DX INJ NEW DRUG ADDON: CPT

## 2022-12-09 PROCEDURE — 96374 THER/PROPH/DIAG INJ IV PUSH: CPT

## 2022-12-09 PROCEDURE — 99285 EMERGENCY DEPT VISIT HI MDM: CPT | Mod: 25

## 2022-12-09 PROCEDURE — 82962 GLUCOSE BLOOD TEST: CPT

## 2022-12-09 PROCEDURE — 81025 URINE PREGNANCY TEST: CPT | Performed by: EMERGENCY MEDICINE

## 2022-12-09 RX ORDER — PROCHLORPERAZINE EDISYLATE 5 MG/ML
10 INJECTION INTRAMUSCULAR; INTRAVENOUS
Status: COMPLETED | OUTPATIENT
Start: 2022-12-09 | End: 2022-12-09

## 2022-12-09 RX ORDER — KETOROLAC TROMETHAMINE 30 MG/ML
15 INJECTION, SOLUTION INTRAMUSCULAR; INTRAVENOUS
Status: COMPLETED | OUTPATIENT
Start: 2022-12-09 | End: 2022-12-09

## 2022-12-09 RX ORDER — DIPHENHYDRAMINE HYDROCHLORIDE 50 MG/ML
25 INJECTION INTRAMUSCULAR; INTRAVENOUS
Status: COMPLETED | OUTPATIENT
Start: 2022-12-09 | End: 2022-12-09

## 2022-12-09 RX ORDER — DICLOFENAC SODIUM 50 MG/1
50 TABLET, DELAYED RELEASE ORAL 3 TIMES DAILY PRN
Qty: 15 TABLET | Refills: 0 | Status: SHIPPED | OUTPATIENT
Start: 2022-12-09 | End: 2023-03-08

## 2022-12-09 RX ADMIN — DIPHENHYDRAMINE HYDROCHLORIDE 25 MG: 50 INJECTION INTRAMUSCULAR; INTRAVENOUS at 09:12

## 2022-12-09 RX ADMIN — PROCHLORPERAZINE EDISYLATE 10 MG: 5 INJECTION INTRAMUSCULAR; INTRAVENOUS at 09:12

## 2022-12-09 RX ADMIN — KETOROLAC TROMETHAMINE 15 MG: 30 INJECTION, SOLUTION INTRAMUSCULAR; INTRAVENOUS at 09:12

## 2022-12-10 NOTE — ED PROVIDER NOTES
Encounter Date: 2022    SCRIBE #1 NOTE: I, Gutierrez Forbes, am scribing for, and in the presence of,  Kalen Glez MD.     History     Chief Complaint   Patient presents with    Headache     Sudden headache spliting after standing up with bilateral hand sensation difference.      Time seen by provider: 8:57 PM on 2022    Uma Bangura is a 42 y.o. female who presents to the ED with an onset of headache that began in the last several hours, as well as nausea. She states that she stood up to cheer at her daughter's basketball game, when her headache suddenly began. She described the pain as a splitting headache and went from temple to temple. She states that she feels off. She describes the pain of her headache currently as a 6 out of 10, and states that it has gotten better since she has arrived. The patient denies vomiting, changes to speech or vision, chest pain, trouble breathing, numbness, tingling, or any other symptoms at this time. There is no pertinent PMHx. There is no pertinent PSHx.    The history is provided by the patient.   Review of patient's allergies indicates:   Allergen Reactions    Sertraline      Other reaction(s): heartburn     Past Medical History:   Diagnosis Date    Anxiety state, unspecified     Disturbance of salivary secretion     Edema     GERD (gastroesophageal reflux disease)     Goiter, unspecified     Nonspecific elevation of levels of transaminase or lactic acid dehydrogenase (LDH)     Polyphagia(783.6)     Unspecified sleep apnea     Varices of other sites      Past Surgical History:   Procedure Laterality Date     SECTION      x 2    ENDOMETRIAL ABLATION      OOPHORECTOMY Left     Dermoid cyst    TUBAL LIGATION       Family History   Problem Relation Age of Onset    Cancer Mother         thyroid    Hypertension Mother     Diabetes Father     Hypertension Father      Social History     Tobacco Use    Smoking status: Never    Smokeless tobacco: Never    Substance Use Topics    Alcohol use: Yes     Comment: occasionally    Drug use: No     Review of Systems   Constitutional:  Negative for fever.   HENT:  Negative for sore throat.    Eyes:  Negative for visual disturbance.   Respiratory:  Negative for shortness of breath.    Cardiovascular:  Negative for chest pain.   Gastrointestinal:  Positive for nausea. Negative for vomiting.   Genitourinary:  Negative for dysuria.   Musculoskeletal:  Negative for back pain.   Skin:  Negative for rash.   Neurological:  Positive for headaches. Negative for speech difficulty, weakness and numbness.   Hematological:  Does not bruise/bleed easily.     Physical Exam     Initial Vitals [12/09/22 1930]   BP Pulse Resp Temp SpO2   (!) 165/76 86 20 98.5 °F (36.9 °C) 98 %      MAP       --         Physical Exam    Nursing note and vitals reviewed.  Constitutional: She appears well-developed and well-nourished. She is not diaphoretic. No distress.   HENT:   Head: Normocephalic and atraumatic.   Eyes: EOM are normal. Pupils are equal, round, and reactive to light.   Neck: Neck supple.   Normal range of motion.  Cardiovascular:  Normal rate, regular rhythm, normal heart sounds and intact distal pulses.     Exam reveals no gallop and no friction rub.       No murmur heard.  Pulmonary/Chest: Breath sounds normal. No respiratory distress. She has no wheezes. She has no rhonchi. She has no rales.   Abdominal: Abdomen is soft. Bowel sounds are normal. There is no abdominal tenderness. There is no rebound and no guarding.   Musculoskeletal:         General: Normal range of motion.      Cervical back: Normal range of motion and neck supple.     Neurological: She is alert and oriented to person, place, and time.   Cranial nerves III through XII grossly intact. 5/5 strength with intact sensation to BUE's and BLE's.      Skin: Skin is warm and dry.   Psychiatric: She has a normal mood and affect. Her behavior is normal. Judgment and thought content  normal.       ED Course   Procedures  Labs Reviewed   PREGNANCY TEST, URINE RAPID    Narrative:     Specimen Source->Urine   POCT GLUCOSE          Imaging Results              CT Head Without Contrast (Final result)  Result time 12/09/22 20:59:01      Final result by Elisa Quiñones MD (12/09/22 20:59:01)                   Impression:      No acute intracranial abnormality or significant focal finding as imaged..    No evidence of bony lesion or fracture.      Electronically signed by: Elisa Quiñones  Date:    12/09/2022  Time:    20:59               Narrative:    EXAMINATION:  CT HEAD WITHOUT CONTRAST    CLINICAL HISTORY:  Headache, sudden, severe;    TECHNIQUE:  Low dose axial images were obtained through the head.  Coronal and sagittal reformations were also performed. Contrast was not administered.    COMPARISON:  None.    FINDINGS:  There is no evidence of acute intracranial intra or extra-axial hemorrhage or hematoma.  The gray-white matter junction differentiation is intact with no evidence of acute major arterial infarct.  There is no convincing focal mass or mass effect.  Ventricles, cisterns and sulci are appropriate for patient's age.  There is no hydrocephalus.    The imaged paranasal sinuses, mastoid air cells and middle ears appear clear bilaterally.  Orbital structures appear stent is symmetric and grossly intact.  Posterior fossa structures and pituitary gland are unremarkable allowing for skull base artifact.  Bony calvarium is intact.                                       Medications   ketorolac injection 15 mg (15 mg Intravenous Given 12/9/22 2133)   prochlorperazine injection Soln 10 mg (10 mg Intravenous Given 12/9/22 2128)   diphenhydrAMINE injection 25 mg (25 mg Intravenous Given 12/9/22 2127)     Medical Decision Making:   History:   Old Medical Records: I decided to obtain old medical records.  Initial Assessment:   42-year-old female presents with a headache.  Differential Diagnosis:    Initial differential diagnosis included but not limited to subarachnoid hemorrhage, tension headache, and acute stress headache.  Clinical Tests:   Radiological Study: Ordered and Reviewed  ED Management:  The patient was emergently evaluated in the emergency department, her evaluation was significant for a middle-age female with a normal neurologic exam.  The patient's CT scan of her head showed no acute abnormalities per Radiology.  The patient was treated with IV Compazine, IV Benadryl, and IV Toradol here in the emergency department.  The patient reports improvement in her symptoms after treatment.  The patient may have had an acute stress headache.  She is stable for discharge to home.  She will be discharged home with p.o. diclofenac and she is referred to primary care for follow-up.        Scribe Attestation:   Scribe #1: I performed the above scribed service and the documentation accurately describes the services I performed. I attest to the accuracy of the note.               I, Dr. Kalen Glez, personally performed the services described in this documentation. All medical record entries made by the scribe were at my direction and in my presence.  I have reviewed the chart and agree that the record reflects my personal performance and is accurate and complete. Kalen Glez MD.  1:10 AM 12/10/2022      Clinical Impression:   Final diagnoses:  [R51.9] Acute nonintractable headache, unspecified headache type (Primary)      ED Disposition Condition    Discharge Stable          ED Prescriptions       Medication Sig Dispense Start Date End Date Auth. Provider    diclofenac (VOLTAREN) 50 MG EC tablet Take 1 tablet (50 mg total) by mouth 3 (three) times daily as needed (pain). 15 tablet 12/9/2022 -- Kalen Glez MD          Follow-up Information       Follow up With Specialties Details Why Contact Info    Marine Pillai MD Family Medicine Schedule an appointment as soon as possible for a visit   [id number] [facility]  [place] [address]  [phone]               Kalen Glez MD  12/10/22 0112

## 2022-12-10 NOTE — ED NOTES
"C/o sudden onset headache while she was at a ballgame.  Stated she stood up and had a sudden headache that was located at both "temples."  Stated it is mildly improved since being here but remains painful 7/10.  "

## 2022-12-12 ENCOUNTER — TELEPHONE (OUTPATIENT)
Dept: FAMILY MEDICINE | Facility: CLINIC | Age: 42
End: 2022-12-12
Payer: COMMERCIAL

## 2022-12-12 NOTE — TELEPHONE ENCOUNTER
----- Message from Adrianne Pedro sent at 12/12/2022 11:44 AM CST -----  Regarding: CALL BACK FOR F/U ADVICE  Contact: Patient  Type: Patient Call Back         Who called: Patient         What is the request in detail: calling to see if she should do her ER f/u visit with pcp or with neurology; requesting a call back from staff; please advise         Best call back number: [phone]         Additional Information: state she had a headache that went up her neck and from temple to temple; states it was an excruciating pressure; states a cat scan was done and nothing was found; medication was prescribed and requested she f/u with her provider; states she can fill the pressure at different time; please advise             Thank You

## 2022-12-13 ENCOUNTER — TELEPHONE (OUTPATIENT)
Dept: FAMILY MEDICINE | Facility: CLINIC | Age: 42
End: 2022-12-13
Payer: COMMERCIAL

## 2022-12-15 ENCOUNTER — TELEPHONE (OUTPATIENT)
Dept: FAMILY MEDICINE | Facility: CLINIC | Age: 42
End: 2022-12-15
Payer: COMMERCIAL

## 2022-12-15 DIAGNOSIS — R51.9 NONINTRACTABLE HEADACHE, UNSPECIFIED CHRONICITY PATTERN, UNSPECIFIED HEADACHE TYPE: Primary | ICD-10-CM

## 2022-12-15 NOTE — TELEPHONE ENCOUNTER
----- Message from Oly Locke sent at 12/13/2022  3:29 PM CST -----  Contact: pt  Type: Return Call    Who Called: pt  Who Left Message for Pt: Sole  Does the patient know what this is regarding: appt  Best Call Back Number: [phone]    Thank you~       
Attempted to call pt, no answer, left VM.   Portal msg sent.   
Patient was seen in the ER on 12/9/22 for a really bad headache. It was bad pressure. Stated she had to leave her child game because the pain was so bad.  She also says she still doesn't feel right.    ER told her to follow up with  But she wasn't sure if you wanted to see her here or should she go to a neurologist. She stated that she used to see Dr. Montes.    Please advise.  
Recommend she be seen in primary care first. Any provider  
I have reviewed and confirmed nurses' notes...

## 2022-12-15 NOTE — TELEPHONE ENCOUNTER
Returned call and spoke to patient regarding ER visit and symptoms. CT-Scan was completed during her visit at the ER and is requesting to have an MRI and referral to neurology.. Please advise... CT-Scan is normal.

## 2022-12-15 NOTE — TELEPHONE ENCOUNTER
----- Message from Inés Pittman sent at 12/15/2022 10:23 AM CST -----  Contact: patient  Type:  Sooner Apoointment Request      Name of Caller: patient     When is the first available appointment?    Symptoms: er f/u- bad headache     Would the patient rather a call back or a response via [facility]? Call     Best Call Back Number:[phone] (home)      Additional Information:  Patient would like to speak with the nurse first.

## 2022-12-18 NOTE — TELEPHONE ENCOUNTER
I cannot order mri without assessment in clinic.  I can place neurology referral if patient prefers.  Referral placed

## 2023-01-04 ENCOUNTER — PATIENT MESSAGE (OUTPATIENT)
Dept: NEUROLOGY | Facility: CLINIC | Age: 43
End: 2023-01-04

## 2023-01-10 ENCOUNTER — PATIENT MESSAGE (OUTPATIENT)
Dept: NEUROLOGY | Facility: CLINIC | Age: 43
End: 2023-01-10

## 2023-01-10 NOTE — TELEPHONE ENCOUNTER
LVM for the patient. Offered next available appointment virtual visit today. Followed up with portal message

## 2023-03-08 ENCOUNTER — LAB VISIT (OUTPATIENT)
Dept: LAB | Facility: HOSPITAL | Age: 43
End: 2023-03-08
Attending: FAMILY MEDICINE
Payer: COMMERCIAL

## 2023-03-08 ENCOUNTER — OFFICE VISIT (OUTPATIENT)
Dept: FAMILY MEDICINE | Facility: CLINIC | Age: 43
End: 2023-03-08
Payer: COMMERCIAL

## 2023-03-08 VITALS
TEMPERATURE: 99 F | BODY MASS INDEX: 36.99 KG/M2 | SYSTOLIC BLOOD PRESSURE: 112 MMHG | HEART RATE: 74 BPM | OXYGEN SATURATION: 95 % | WEIGHT: 230.19 LBS | RESPIRATION RATE: 16 BRPM | DIASTOLIC BLOOD PRESSURE: 60 MMHG | HEIGHT: 66 IN

## 2023-03-08 DIAGNOSIS — G43.809 OTHER MIGRAINE WITHOUT STATUS MIGRAINOSUS, NOT INTRACTABLE: ICD-10-CM

## 2023-03-08 DIAGNOSIS — E66.01 SEVERE OBESITY (BMI 35.0-39.9) WITH COMORBIDITY: ICD-10-CM

## 2023-03-08 DIAGNOSIS — J20.9 ACUTE BRONCHITIS, UNSPECIFIED ORGANISM: ICD-10-CM

## 2023-03-08 DIAGNOSIS — E78.5 HYPERLIPIDEMIA, UNSPECIFIED HYPERLIPIDEMIA TYPE: ICD-10-CM

## 2023-03-08 DIAGNOSIS — D50.9 IRON DEFICIENCY ANEMIA, UNSPECIFIED IRON DEFICIENCY ANEMIA TYPE: Primary | ICD-10-CM

## 2023-03-08 LAB
ALBUMIN SERPL BCP-MCNC: 3.7 G/DL (ref 3.5–5.2)
ALP SERPL-CCNC: 95 U/L (ref 55–135)
ALT SERPL W/O P-5'-P-CCNC: 20 U/L (ref 10–44)
ANION GAP SERPL CALC-SCNC: 9 MMOL/L (ref 8–16)
AST SERPL-CCNC: 20 U/L (ref 10–40)
BILIRUB SERPL-MCNC: 0.7 MG/DL (ref 0.1–1)
BUN SERPL-MCNC: 14 MG/DL (ref 6–20)
CALCIUM SERPL-MCNC: 9.4 MG/DL (ref 8.7–10.5)
CHLORIDE SERPL-SCNC: 103 MMOL/L (ref 95–110)
CO2 SERPL-SCNC: 25 MMOL/L (ref 23–29)
CREAT SERPL-MCNC: 1.1 MG/DL (ref 0.5–1.4)
EST. GFR  (NO RACE VARIABLE): >60 ML/MIN/1.73 M^2
GLUCOSE SERPL-MCNC: 88 MG/DL (ref 70–110)
POTASSIUM SERPL-SCNC: 4 MMOL/L (ref 3.5–5.1)
PROT SERPL-MCNC: 7.6 G/DL (ref 6–8.4)
SODIUM SERPL-SCNC: 137 MMOL/L (ref 136–145)

## 2023-03-08 PROCEDURE — 36415 COLL VENOUS BLD VENIPUNCTURE: CPT | Performed by: FAMILY MEDICINE

## 2023-03-08 PROCEDURE — 99214 OFFICE O/P EST MOD 30 MIN: CPT | Mod: S$GLB,,, | Performed by: FAMILY MEDICINE

## 2023-03-08 PROCEDURE — 99214 PR OFFICE/OUTPT VISIT, EST, LEVL IV, 30-39 MIN: ICD-10-PCS | Mod: S$GLB,,, | Performed by: FAMILY MEDICINE

## 2023-03-08 PROCEDURE — 1160F RVW MEDS BY RX/DR IN RCRD: CPT | Mod: CPTII,S$GLB,, | Performed by: FAMILY MEDICINE

## 2023-03-08 PROCEDURE — 3074F PR MOST RECENT SYSTOLIC BLOOD PRESSURE < 130 MM HG: ICD-10-PCS | Mod: CPTII,S$GLB,, | Performed by: FAMILY MEDICINE

## 2023-03-08 PROCEDURE — 3044F HG A1C LEVEL LT 7.0%: CPT | Mod: CPTII,S$GLB,, | Performed by: FAMILY MEDICINE

## 2023-03-08 PROCEDURE — 1159F PR MEDICATION LIST DOCUMENTED IN MEDICAL RECORD: ICD-10-PCS | Mod: CPTII,S$GLB,, | Performed by: FAMILY MEDICINE

## 2023-03-08 PROCEDURE — 1160F PR REVIEW ALL MEDS BY PRESCRIBER/CLIN PHARMACIST DOCUMENTED: ICD-10-PCS | Mod: CPTII,S$GLB,, | Performed by: FAMILY MEDICINE

## 2023-03-08 PROCEDURE — 3078F PR MOST RECENT DIASTOLIC BLOOD PRESSURE < 80 MM HG: ICD-10-PCS | Mod: CPTII,S$GLB,, | Performed by: FAMILY MEDICINE

## 2023-03-08 PROCEDURE — 80053 COMPREHEN METABOLIC PANEL: CPT | Performed by: FAMILY MEDICINE

## 2023-03-08 PROCEDURE — 83036 HEMOGLOBIN GLYCOSYLATED A1C: CPT | Performed by: FAMILY MEDICINE

## 2023-03-08 PROCEDURE — 3008F BODY MASS INDEX DOCD: CPT | Mod: CPTII,S$GLB,, | Performed by: FAMILY MEDICINE

## 2023-03-08 PROCEDURE — 3044F PR MOST RECENT HEMOGLOBIN A1C LEVEL <7.0%: ICD-10-PCS | Mod: CPTII,S$GLB,, | Performed by: FAMILY MEDICINE

## 2023-03-08 PROCEDURE — 1159F MED LIST DOCD IN RCRD: CPT | Mod: CPTII,S$GLB,, | Performed by: FAMILY MEDICINE

## 2023-03-08 PROCEDURE — 99999 PR PBB SHADOW E&M-EST. PATIENT-LVL IV: CPT | Mod: PBBFAC,,, | Performed by: FAMILY MEDICINE

## 2023-03-08 PROCEDURE — 3074F SYST BP LT 130 MM HG: CPT | Mod: CPTII,S$GLB,, | Performed by: FAMILY MEDICINE

## 2023-03-08 PROCEDURE — 99999 PR PBB SHADOW E&M-EST. PATIENT-LVL IV: ICD-10-PCS | Mod: PBBFAC,,, | Performed by: FAMILY MEDICINE

## 2023-03-08 PROCEDURE — 3008F PR BODY MASS INDEX (BMI) DOCUMENTED: ICD-10-PCS | Mod: CPTII,S$GLB,, | Performed by: FAMILY MEDICINE

## 2023-03-08 PROCEDURE — 3078F DIAST BP <80 MM HG: CPT | Mod: CPTII,S$GLB,, | Performed by: FAMILY MEDICINE

## 2023-03-08 RX ORDER — PROPRANOLOL HYDROCHLORIDE 60 MG/1
60 CAPSULE, EXTENDED RELEASE ORAL DAILY
Qty: 90 CAPSULE | Refills: 3 | Status: SHIPPED | OUTPATIENT
Start: 2023-03-08 | End: 2023-05-10

## 2023-03-08 RX ORDER — TOPIRAMATE 50 MG/1
50 TABLET, FILM COATED ORAL 2 TIMES DAILY
COMMUNITY
End: 2023-10-17

## 2023-03-08 RX ORDER — PROMETHAZINE HYDROCHLORIDE AND DEXTROMETHORPHAN HYDROBROMIDE 6.25; 15 MG/5ML; MG/5ML
5 SYRUP ORAL EVERY 4 HOURS PRN
Qty: 118 ML | Refills: 0 | Status: SHIPPED | OUTPATIENT
Start: 2023-03-08 | End: 2023-03-18

## 2023-03-08 NOTE — PROGRESS NOTES
Subjective:       Patient ID: Uma Bangura is a 43 y.o. female.    Chief Complaint: Follow-up (6mth f/u)    HPI  Review of Systems   Constitutional:  Positive for fatigue. Negative for chills and fever.   HENT:  Positive for congestion, postnasal drip, rhinorrhea, sneezing and sore throat. Negative for ear discharge, ear pain and sinus pressure.    Respiratory:  Positive for cough. Negative for shortness of breath and wheezing.      Patient Active Problem List   Diagnosis    GERD (gastroesophageal reflux disease)    Nodular goiter    Unspecified sleep apnea    Nonspecific elevation of levels of transaminase or lactic acid dehydrogenase (LDH)    Anxiety state, unspecified    Malaise and fatigue    Herpes zoster    Edema    Normocytic anemia    Hyperlipidemia     Patient is here for a chronic conditions follow up.    Urgent care diagnosed with asthmatic bronchitis and rxd steroid pack , tesssoln perles, albuterol inhaler. No fever.  Cough still intense    GYN Dr. Orona Mild normocytic anemia. Donates blood regularly. Had h/o DUb and s/p ablation. Periods monthly cyclically.  . C/o brain fog and difficulty concentrating. Difficulty losing weight     Neuro Dr. Lai treating HAs. Seen in ED 12/22 for splitting HA after shouting at ball game.  CT head No acute intracranial abnormality or significant focal finding as imaged..   No evidence of bony lesion or fracture.  MRI brain ordered . Topomax started.  Requested massage for neck and shoulder -insurance shouldn't cover it. Topomax not causing weight loss but is causing brain fog    Heme/onc Dr. Valiente treating iron def anemia. Iron normal 11/22. Fit neg. Anemia resolved    Mammo 7/2022 neg      Objective:      Physical Exam  Vitals and nursing note reviewed.   Constitutional:       Appearance: She is well-developed.   HENT:      Right Ear: Tympanic membrane and ear canal normal.      Left Ear: Tympanic membrane and ear canal normal.      Nose: Mucosal edema  "and rhinorrhea present.      Right Sinus: No maxillary sinus tenderness or frontal sinus tenderness.      Left Sinus: No maxillary sinus tenderness or frontal sinus tenderness.      Mouth/Throat:      Pharynx: Posterior oropharyngeal erythema present. No oropharyngeal exudate.      Tonsils: No tonsillar abscesses.   Cardiovascular:      Rate and Rhythm: Normal rate and regular rhythm.      Heart sounds: Normal heart sounds.   Pulmonary:      Effort: Pulmonary effort is normal.      Breath sounds: Normal breath sounds.   Skin:     General: Skin is warm and dry.       Assessment:       1. Iron deficiency anemia, unspecified iron deficiency anemia type    2. Hyperlipidemia, unspecified hyperlipidemia type    3. Severe obesity (BMI 35.0-39.9) with comorbidity    4. Other migraine without status migrainosus, not intractable    5. Acute bronchitis, unspecified organism        Plan:         1. Iron deficiency anemia, unspecified iron deficiency anemia type  Normal. Cont to monitor  - CBC Auto Differential; Future  - Ferritin; Future  - Iron and TIBC; Future    2. Hyperlipidemia, unspecified hyperlipidemia type  Stable condition.  Continue current medications.  Will adjust based on lab findings or if condition changes.    - Comprehensive Metabolic Panel; Future  - Lipid Panel; Future    3. Severe obesity (BMI 35.0-39.9) with comorbidity  Counseled patient on his ideal body weight, health consequences of being obese and current recommendations including weekly exercise and a heart healthy diet.  Current BMI is:Estimated body mass index is 37.15 kg/m² as calculated from the following:    Height as of this encounter: 5' 6" (1.676 m).    Weight as of this encounter: 104.4 kg (230 lb 2.6 oz)..  Patient is aware that ideal BMI < 25 or Weight in (lb) to have BMI = 25: 154.6.    - Comprehensive Metabolic Panel; Future  - Hemoglobin A1C; Future    4. Other migraine without status migrainosus, not intractable  Treat  - propranoloL " (INDERAL LA) 60 MG 24 hr capsule; Take 1 capsule (60 mg total) by mouth once daily.  Dispense: 90 capsule; Refill: 3    5. Acute bronchitis, unspecified organism  General home care guidelines for cough and congestion:increase fluid intake, get plenty of rest, and use OTC cough and cold medications for relief of symptoms.  I recommended guafenesin for congestion and dextromethorphan as directed for cough.  Brands like Mucinex DM or Chloricidin HBP or similar are recommended.  Avoidance of decongestants is recommended for patients with heart problems and hypertension.  Extra vitamin C may also benefit.  Return to clinic if symptoms last longer than 10 days or sooner if worsen with symptoms like fever > 100.4, severe sinus pain or headache, thick yellow nasal discharge or sputum, dehydration, sudden confusion or mental status changes, shortness of breath, labored breathing, or lethargy. Anti-fever medications can be alternated like OTC ibuprofen or tylenol as needed and as directed unless told to avoid these by your doctor.     - Comprehensive Metabolic Panel; Future  - Hemoglobin A1C; Future      Time spent with patient: 20 minutes    Patient with be reevaluated in 6 months or sooner prn    Greater than 50% of this visit was spent counseling as described in above documentation:Yes

## 2023-03-09 LAB
ESTIMATED AVG GLUCOSE: 108 MG/DL (ref 68–131)
HBA1C MFR BLD: 5.4 % (ref 4–5.6)

## 2023-03-11 ENCOUNTER — NURSE TRIAGE (OUTPATIENT)
Dept: ADMINISTRATIVE | Facility: CLINIC | Age: 43
End: 2023-03-11
Payer: COMMERCIAL

## 2023-03-11 NOTE — TELEPHONE ENCOUNTER
OOC outgoing call -     Pt c/o low bp 88/62 61, severe difficulty breathing, hx bronchitis. Pt said yes to severe sob. Hypotension protocol followed and pt advised to hang up and call 911 now for symptomatic hypotension. Alert and oriented, Pt agrees to follow through with dispo. Encounter routed to PCP/staff as high priority.   Reason for Disposition   SEVERE difficulty breathing (e.g., struggling for each breath, speaks in single words)   Sounds like a life-threatening emergency to the triager    Additional Information   Negative: Difficult to awaken or acting confused (e.g., disoriented, slurred speech)    Protocols used: Blood Pressure - High-A-AH, Blood Pressure - Low-A-AH

## 2023-03-12 NOTE — TELEPHONE ENCOUNTER
Agree with plan to call 911 and go by EMS to nearest ED. Please call to check on her and offer f/u once discharged

## 2023-03-13 NOTE — TELEPHONE ENCOUNTER
Called patient in regards to triage call symptoms, concerns , and ER visit. No answer , left detailed voicemail to return call.

## 2023-05-10 ENCOUNTER — LAB VISIT (OUTPATIENT)
Dept: LAB | Facility: HOSPITAL | Age: 43
End: 2023-05-10
Attending: OBSTETRICS & GYNECOLOGY
Payer: COMMERCIAL

## 2023-05-10 ENCOUNTER — OFFICE VISIT (OUTPATIENT)
Dept: OBSTETRICS AND GYNECOLOGY | Facility: CLINIC | Age: 43
End: 2023-05-10
Payer: COMMERCIAL

## 2023-05-10 VITALS
BODY MASS INDEX: 37.6 KG/M2 | SYSTOLIC BLOOD PRESSURE: 122 MMHG | WEIGHT: 233.94 LBS | DIASTOLIC BLOOD PRESSURE: 80 MMHG | HEIGHT: 66 IN | RESPIRATION RATE: 20 BRPM

## 2023-05-10 DIAGNOSIS — R63.5 WEIGHT GAIN: ICD-10-CM

## 2023-05-10 DIAGNOSIS — D50.0 IRON DEFICIENCY ANEMIA DUE TO CHRONIC BLOOD LOSS: ICD-10-CM

## 2023-05-10 DIAGNOSIS — N93.9 ABNORMAL UTERINE BLEEDING (AUB): Primary | ICD-10-CM

## 2023-05-10 DIAGNOSIS — N95.1 MENOPAUSAL SYMPTOMS: ICD-10-CM

## 2023-05-10 DIAGNOSIS — R06.02 SOB (SHORTNESS OF BREATH) ON EXERTION: ICD-10-CM

## 2023-05-10 LAB
ALBUMIN SERPL BCP-MCNC: 4 G/DL (ref 3.5–5.2)
ALP SERPL-CCNC: 70 U/L (ref 55–135)
ALT SERPL W/O P-5'-P-CCNC: 17 U/L (ref 10–44)
ANION GAP SERPL CALC-SCNC: 9 MMOL/L (ref 8–16)
AST SERPL-CCNC: 19 U/L (ref 10–40)
BASOPHILS # BLD AUTO: 0.06 K/UL (ref 0–0.2)
BASOPHILS NFR BLD: 0.8 % (ref 0–1.9)
BILIRUB SERPL-MCNC: 0.4 MG/DL (ref 0.1–1)
BUN SERPL-MCNC: 15 MG/DL (ref 6–20)
CALCIUM SERPL-MCNC: 9.8 MG/DL (ref 8.7–10.5)
CHLORIDE SERPL-SCNC: 108 MMOL/L (ref 95–110)
CO2 SERPL-SCNC: 25 MMOL/L (ref 23–29)
CREAT SERPL-MCNC: 1.2 MG/DL (ref 0.5–1.4)
DIFFERENTIAL METHOD: NORMAL
EOSINOPHIL # BLD AUTO: 0.1 K/UL (ref 0–0.5)
EOSINOPHIL NFR BLD: 1.3 % (ref 0–8)
ERYTHROCYTE [DISTWIDTH] IN BLOOD BY AUTOMATED COUNT: 12.9 % (ref 11.5–14.5)
EST. GFR  (NO RACE VARIABLE): 57 ML/MIN/1.73 M^2
ESTRADIOL SERPL-MCNC: 23 PG/ML
FERRITIN SERPL-MCNC: 39 NG/ML (ref 20–300)
FSH SERPL-ACNC: 25.22 MIU/ML
GLUCOSE SERPL-MCNC: 89 MG/DL (ref 70–110)
HCT VFR BLD AUTO: 42 % (ref 37–48.5)
HGB BLD-MCNC: 13.8 G/DL (ref 12–16)
IMM GRANULOCYTES # BLD AUTO: 0.02 K/UL (ref 0–0.04)
IMM GRANULOCYTES NFR BLD AUTO: 0.3 % (ref 0–0.5)
INSULIN COLLECTION INTERVAL: NORMAL
INSULIN SERPL-ACNC: 16.9 UU/ML
LH SERPL-ACNC: 5.7 MIU/ML
LYMPHOCYTES # BLD AUTO: 3.1 K/UL (ref 1–4.8)
LYMPHOCYTES NFR BLD: 39.2 % (ref 18–48)
MCH RBC QN AUTO: 28.8 PG (ref 27–31)
MCHC RBC AUTO-ENTMCNC: 32.9 G/DL (ref 32–36)
MCV RBC AUTO: 88 FL (ref 82–98)
MONOCYTES # BLD AUTO: 0.5 K/UL (ref 0.3–1)
MONOCYTES NFR BLD: 5.9 % (ref 4–15)
NEUTROPHILS # BLD AUTO: 4.2 K/UL (ref 1.8–7.7)
NEUTROPHILS NFR BLD: 52.5 % (ref 38–73)
NRBC BLD-RTO: 0 /100 WBC
PLATELET # BLD AUTO: 252 K/UL (ref 150–450)
PMV BLD AUTO: 10.5 FL (ref 9.2–12.9)
POTASSIUM SERPL-SCNC: 4.1 MMOL/L (ref 3.5–5.1)
PROT SERPL-MCNC: 7.1 G/DL (ref 6–8.4)
RBC # BLD AUTO: 4.79 M/UL (ref 4–5.4)
RETICS/RBC NFR AUTO: 1.1 % (ref 0.5–2.5)
SODIUM SERPL-SCNC: 142 MMOL/L (ref 136–145)
T3 SERPL-MCNC: 120 NG/DL (ref 60–180)
T4 FREE SERPL-MCNC: 1.15 NG/DL (ref 0.71–1.51)
THYROPEROXIDASE IGG SERPL-ACNC: <6 IU/ML
TSH SERPL DL<=0.005 MIU/L-ACNC: 1.81 UIU/ML (ref 0.4–4)
WBC # BLD AUTO: 7.98 K/UL (ref 3.9–12.7)

## 2023-05-10 PROCEDURE — 84403 ASSAY OF TOTAL TESTOSTERONE: CPT | Performed by: OBSTETRICS & GYNECOLOGY

## 2023-05-10 PROCEDURE — 83525 ASSAY OF INSULIN: CPT | Performed by: OBSTETRICS & GYNECOLOGY

## 2023-05-10 PROCEDURE — 84443 ASSAY THYROID STIM HORMONE: CPT | Performed by: OBSTETRICS & GYNECOLOGY

## 2023-05-10 PROCEDURE — 3074F PR MOST RECENT SYSTOLIC BLOOD PRESSURE < 130 MM HG: ICD-10-PCS | Mod: CPTII,S$GLB,, | Performed by: OBSTETRICS & GYNECOLOGY

## 2023-05-10 PROCEDURE — 3074F SYST BP LT 130 MM HG: CPT | Mod: CPTII,S$GLB,, | Performed by: OBSTETRICS & GYNECOLOGY

## 2023-05-10 PROCEDURE — 99999 PR PBB SHADOW E&M-EST. PATIENT-LVL III: ICD-10-PCS | Mod: PBBFAC,,, | Performed by: OBSTETRICS & GYNECOLOGY

## 2023-05-10 PROCEDURE — 82728 ASSAY OF FERRITIN: CPT | Performed by: OBSTETRICS & GYNECOLOGY

## 2023-05-10 PROCEDURE — 86376 MICROSOMAL ANTIBODY EACH: CPT | Performed by: OBSTETRICS & GYNECOLOGY

## 2023-05-10 PROCEDURE — 3044F PR MOST RECENT HEMOGLOBIN A1C LEVEL <7.0%: ICD-10-PCS | Mod: CPTII,S$GLB,, | Performed by: OBSTETRICS & GYNECOLOGY

## 2023-05-10 PROCEDURE — 3079F DIAST BP 80-89 MM HG: CPT | Mod: CPTII,S$GLB,, | Performed by: OBSTETRICS & GYNECOLOGY

## 2023-05-10 PROCEDURE — 3079F PR MOST RECENT DIASTOLIC BLOOD PRESSURE 80-89 MM HG: ICD-10-PCS | Mod: CPTII,S$GLB,, | Performed by: OBSTETRICS & GYNECOLOGY

## 2023-05-10 PROCEDURE — 84270 ASSAY OF SEX HORMONE GLOBUL: CPT | Performed by: OBSTETRICS & GYNECOLOGY

## 2023-05-10 PROCEDURE — 3008F PR BODY MASS INDEX (BMI) DOCUMENTED: ICD-10-PCS | Mod: CPTII,S$GLB,, | Performed by: OBSTETRICS & GYNECOLOGY

## 2023-05-10 PROCEDURE — 85045 AUTOMATED RETICULOCYTE COUNT: CPT | Performed by: OBSTETRICS & GYNECOLOGY

## 2023-05-10 PROCEDURE — 85025 COMPLETE CBC W/AUTO DIFF WBC: CPT | Performed by: OBSTETRICS & GYNECOLOGY

## 2023-05-10 PROCEDURE — 84480 ASSAY TRIIODOTHYRONINE (T3): CPT | Performed by: OBSTETRICS & GYNECOLOGY

## 2023-05-10 PROCEDURE — 84439 ASSAY OF FREE THYROXINE: CPT | Performed by: OBSTETRICS & GYNECOLOGY

## 2023-05-10 PROCEDURE — 80053 COMPREHEN METABOLIC PANEL: CPT | Performed by: OBSTETRICS & GYNECOLOGY

## 2023-05-10 PROCEDURE — 99214 PR OFFICE/OUTPT VISIT, EST, LEVL IV, 30-39 MIN: ICD-10-PCS | Mod: S$GLB,,, | Performed by: OBSTETRICS & GYNECOLOGY

## 2023-05-10 PROCEDURE — 99999 PR PBB SHADOW E&M-EST. PATIENT-LVL III: CPT | Mod: PBBFAC,,, | Performed by: OBSTETRICS & GYNECOLOGY

## 2023-05-10 PROCEDURE — 83002 ASSAY OF GONADOTROPIN (LH): CPT | Performed by: OBSTETRICS & GYNECOLOGY

## 2023-05-10 PROCEDURE — 83001 ASSAY OF GONADOTROPIN (FSH): CPT | Performed by: OBSTETRICS & GYNECOLOGY

## 2023-05-10 PROCEDURE — 99214 OFFICE O/P EST MOD 30 MIN: CPT | Mod: S$GLB,,, | Performed by: OBSTETRICS & GYNECOLOGY

## 2023-05-10 PROCEDURE — 82670 ASSAY OF TOTAL ESTRADIOL: CPT | Performed by: OBSTETRICS & GYNECOLOGY

## 2023-05-10 PROCEDURE — 1159F PR MEDICATION LIST DOCUMENTED IN MEDICAL RECORD: ICD-10-PCS | Mod: CPTII,S$GLB,, | Performed by: OBSTETRICS & GYNECOLOGY

## 2023-05-10 PROCEDURE — 3044F HG A1C LEVEL LT 7.0%: CPT | Mod: CPTII,S$GLB,, | Performed by: OBSTETRICS & GYNECOLOGY

## 2023-05-10 PROCEDURE — 3008F BODY MASS INDEX DOCD: CPT | Mod: CPTII,S$GLB,, | Performed by: OBSTETRICS & GYNECOLOGY

## 2023-05-10 PROCEDURE — 1159F MED LIST DOCD IN RCRD: CPT | Mod: CPTII,S$GLB,, | Performed by: OBSTETRICS & GYNECOLOGY

## 2023-05-10 NOTE — PROGRESS NOTES
Chief Complaint   Patient presents with    Oligomenorrhea     On cycle after not having one for 5 months, weight gain       History and Physical:  Patient's last menstrual period was 2023 (exact date).    Contraception:  Bilateral tubal ligation    Uma Bangura is a 43 y.o.  who presents today for her routine annual GYN exam.   The patient reports a history of endometrial ablation for abnormal uterine bleeding.  She now has some irregular cycles and was recently diagnosed with iron deficiency anemia.    She had a recent mammogram   No family history of colon cancers.  She does report some recent hair loss issues.    Date: 10/20/2022    The patient presents today for follow-up.  She was last seen as above.  In light of the above and her anemia lab work and pelvic ultrasound were ordered.    The patient presents today to discuss results of the above   She reports no bleeding since our last evaluation.    Date: 5/10/2023    The patient presents today for follow-up.  She was last seen as above.    The patient is currently being followed as above.    Since her last evaluation she is continued to have irregular menstrual periods.  She denies any pelvic pain.    She is concerned regarding weight gain     In addition over the last 4 months she is had issues with poor sleep as well as shortness of breath which increases with exertion.  No chest pain.  No other cardiac or pulmonary issues.      Allergies:   Review of patient's allergies indicates:   Allergen Reactions    Sertraline      Other reaction(s): heartburn       Past Medical History:   Diagnosis Date    Anxiety state, unspecified     Disturbance of salivary secretion     Edema     GERD (gastroesophageal reflux disease)     Goiter, unspecified     Nonspecific elevation of levels of transaminase or lactic acid dehydrogenase (LDH)     Polyphagia(783.6)     Unspecified sleep apnea     Varices of other sites        Past Surgical History:   Procedure  Laterality Date     SECTION      x 2    ENDOMETRIAL ABLATION      OOPHORECTOMY Left     Dermoid cyst    TUBAL LIGATION         MEDS:   Current Outpatient Medications:     cycloSPORINE (RESTASIS) 0.05 % ophthalmic emulsion, Place 1 drop into both eyes 2 (two) times daily., Disp: , Rfl:     ferrous sulfate 325 (65 FE) MG EC tablet, TAKE ONE TABLET BY MOUTH EVERY DAY, Disp: 90 tablet, Rfl: 1    magnesium gluconate (MAG-G) 27 mg magnesium (500 mg) Tab tablet, Take 27 mg by mouth once., Disp: , Rfl:     topiramate (TOPAMAX) 50 MG tablet, Take 50 mg by mouth 2 (two) times daily., Disp: , Rfl:      OB History          2    Para   2    Term   2            AB        Living   2         SAB        IAB        Ectopic        Multiple        Live Births   2           Obstetric Comments   Caesarean section x2               Social History     Socioeconomic History    Marital status:    Tobacco Use    Smoking status: Never    Smokeless tobacco: Never   Substance and Sexual Activity    Alcohol use: Yes     Comment: occasionally    Drug use: No    Sexual activity: Yes     Partners: Male     Birth control/protection: See Surgical Hx     Comment:        Family History   Problem Relation Age of Onset    Cancer Mother         thyroid    Hypertension Mother     Diabetes Father     Hypertension Father          Past medical and surgical history reviewed.   I have reviewed the patient's medical history in detail and updated the computerized patient record.      Review of Systems (at today's evaluation)  Review of Systems   Constitutional:  Negative for fever and unexpected weight change.   Respiratory:  Positive for shortness of breath. Negative for cough.    Cardiovascular:  Negative for chest pain.   Gastrointestinal:  Negative for constipation, diarrhea and nausea.   Genitourinary:  Negative for dysuria and frequency.          GYN AS PER HPI   Musculoskeletal: Negative.    Skin: Negative.   "  Neurological: Negative.    Psychiatric/Behavioral:  Positive for sleep disturbance.       Physical Exam:   /80 (BP Location: Right arm, Patient Position: Sitting, BP Method: Medium (Manual))   Resp 20   Ht 5' 6" (1.676 m)   Wt 106.1 kg (233 lb 14.5 oz)   LMP 05/06/2023 (Exact Date)   BMI 37.75 kg/m²       Physical Exam:   Constitutional: She appears well-developed and well-nourished.    HENT:   Head: Normocephalic.     Neck: No thyroid mass present.    Cardiovascular:  Normal rate.             Pulmonary/Chest: Effort normal and breath sounds normal. No respiratory distress. She has no wheezes. She has no rales.        Abdominal: Soft. There is no abdominal tenderness.             Musculoskeletal: Normal range of motion.      Right lower leg: No edema.      Left lower leg: No edema.       Neurological: She is alert.   No gross lesions noted.    Skin: Skin is warm and dry.    Psychiatric: She has a normal mood and affect. Her speech is normal and behavior is normal. Mood normal.          Assessment:   The primary encounter diagnosis was Abnormal uterine bleeding (AUB). Diagnoses of Weight gain, BMI 37.0-37.9, adult, Iron deficiency anemia due to chronic blood loss, Menopausal symptoms, and SOB (shortness of breath) on exertion were also pertinent to this visit.       Plan:   Abnormal uterine bleeding (AUB)  -     US Pelvis Comp with Transvag NON-OB (xpd; Future; Expected date: 05/10/2023    Weight gain  -     T3; Future; Expected date: 05/10/2023  -     Thyroid Peroxidase Antibody; Future; Expected date: 05/10/2023  -     T4, Free; Future; Expected date: 05/10/2023  -     TSH; Future; Expected date: 05/10/2023    BMI 37.0-37.9, adult    Iron deficiency anemia due to chronic blood loss  -     Reticulocytes; Future; Expected date: 05/10/2023  -     Ferritin; Future; Expected date: 05/10/2023  -     CBC Auto Differential; Future; Expected date: 05/10/2023    Menopausal symptoms  -     Luteinizing Hormone; " Future; Expected date: 05/10/2023  -     Follicle Stimulating Hormone; Future; Expected date: 05/10/2023  -     Testosterone Panel; Future; Expected date: 05/10/2023  -     Estradiol; Future; Expected date: 05/10/2023  -     Insulin, Random; Future; Expected date: 05/10/2023  -     Comprehensive Metabolic Panel; Future; Expected date: 05/10/2023    SOB (shortness of breath) on exertion  -     Ambulatory referral/consult to Pulmonology; Future; Expected date: 05/17/2023      No follow-ups on file.     The above was reviewed and discussed with the patient.    We reviewed her previous gynecologic and medical issues.      At this time will proceed as follows:  Repeat pelvic ultrasound as well as repeat hormonal labs have been ordered  The patient is being referred to pulmonology due to her recent shortness of breath and sleep issues.      We will base further evaluation treatment results of the above testing.      The patient's questions regarding the above were answered and she is in agreement with the current plan.

## 2023-05-11 ENCOUNTER — HOSPITAL ENCOUNTER (OUTPATIENT)
Dept: RADIOLOGY | Facility: HOSPITAL | Age: 43
Discharge: HOME | End: 2023-05-11
Attending: OBSTETRICS & GYNECOLOGY
Payer: COMMERCIAL

## 2023-05-11 DIAGNOSIS — N93.9 ABNORMAL UTERINE BLEEDING (AUB): ICD-10-CM

## 2023-05-11 PROCEDURE — 76856 US PELVIS COMP WITH TRANSVAG NON-OB (XPD): ICD-10-PCS | Mod: 26,,, | Performed by: RADIOLOGY

## 2023-05-11 PROCEDURE — 76830 US PELVIS COMP WITH TRANSVAG NON-OB (XPD): ICD-10-PCS | Mod: 26,,, | Performed by: RADIOLOGY

## 2023-05-11 PROCEDURE — 76856 US EXAM PELVIC COMPLETE: CPT | Mod: TC,PO

## 2023-05-11 PROCEDURE — 76856 US EXAM PELVIC COMPLETE: CPT | Mod: 26,,, | Performed by: RADIOLOGY

## 2023-05-11 PROCEDURE — 76830 TRANSVAGINAL US NON-OB: CPT | Mod: 26,,, | Performed by: RADIOLOGY

## 2023-05-17 ENCOUNTER — OFFICE VISIT (OUTPATIENT)
Dept: PULMONOLOGY | Facility: CLINIC | Age: 43
End: 2023-05-17
Payer: COMMERCIAL

## 2023-05-17 VITALS
OXYGEN SATURATION: 97 % | SYSTOLIC BLOOD PRESSURE: 113 MMHG | DIASTOLIC BLOOD PRESSURE: 79 MMHG | BODY MASS INDEX: 38.41 KG/M2 | HEART RATE: 79 BPM | HEIGHT: 66 IN | WEIGHT: 239 LBS

## 2023-05-17 DIAGNOSIS — R06.09 POST-COVID-19 SYNDROME MANIFESTING AS CHRONIC DYSPNEA: Primary | ICD-10-CM

## 2023-05-17 DIAGNOSIS — R06.02 SOB (SHORTNESS OF BREATH) ON EXERTION: ICD-10-CM

## 2023-05-17 DIAGNOSIS — U09.9 POST-COVID-19 SYNDROME MANIFESTING AS CHRONIC DYSPNEA: Primary | ICD-10-CM

## 2023-05-17 LAB
ALBUMIN SERPL-MCNC: 4.3 G/DL (ref 3.6–5.1)
SHBG SERPL-SCNC: 40 NMOL/L (ref 17–124)
TESTOST FREE SERPL-MCNC: 1 PG/ML (ref 0.2–5)
TESTOST SERPL-MCNC: 10 NG/DL (ref 2–45)
TESTOSTERONE.FREE+WB SERPL-MCNC: 2 NG/DL (ref 0.5–8.5)

## 2023-05-17 PROCEDURE — 3044F PR MOST RECENT HEMOGLOBIN A1C LEVEL <7.0%: ICD-10-PCS | Mod: CPTII,S$GLB,, | Performed by: NURSE PRACTITIONER

## 2023-05-17 PROCEDURE — 3074F PR MOST RECENT SYSTOLIC BLOOD PRESSURE < 130 MM HG: ICD-10-PCS | Mod: CPTII,S$GLB,, | Performed by: NURSE PRACTITIONER

## 2023-05-17 PROCEDURE — 99203 OFFICE O/P NEW LOW 30 MIN: CPT | Mod: S$GLB,,, | Performed by: NURSE PRACTITIONER

## 2023-05-17 PROCEDURE — 1159F MED LIST DOCD IN RCRD: CPT | Mod: CPTII,S$GLB,, | Performed by: NURSE PRACTITIONER

## 2023-05-17 PROCEDURE — 1159F PR MEDICATION LIST DOCUMENTED IN MEDICAL RECORD: ICD-10-PCS | Mod: CPTII,S$GLB,, | Performed by: NURSE PRACTITIONER

## 2023-05-17 PROCEDURE — 99203 PR OFFICE/OUTPT VISIT, NEW, LEVL III, 30-44 MIN: ICD-10-PCS | Mod: S$GLB,,, | Performed by: NURSE PRACTITIONER

## 2023-05-17 PROCEDURE — 3044F HG A1C LEVEL LT 7.0%: CPT | Mod: CPTII,S$GLB,, | Performed by: NURSE PRACTITIONER

## 2023-05-17 PROCEDURE — 3078F DIAST BP <80 MM HG: CPT | Mod: CPTII,S$GLB,, | Performed by: NURSE PRACTITIONER

## 2023-05-17 PROCEDURE — 99999 PR PBB SHADOW E&M-EST. PATIENT-LVL IV: ICD-10-PCS | Mod: PBBFAC,,, | Performed by: NURSE PRACTITIONER

## 2023-05-17 PROCEDURE — 3074F SYST BP LT 130 MM HG: CPT | Mod: CPTII,S$GLB,, | Performed by: NURSE PRACTITIONER

## 2023-05-17 PROCEDURE — 99999 PR PBB SHADOW E&M-EST. PATIENT-LVL IV: CPT | Mod: PBBFAC,,, | Performed by: NURSE PRACTITIONER

## 2023-05-17 PROCEDURE — 3008F PR BODY MASS INDEX (BMI) DOCUMENTED: ICD-10-PCS | Mod: CPTII,S$GLB,, | Performed by: NURSE PRACTITIONER

## 2023-05-17 PROCEDURE — 3078F PR MOST RECENT DIASTOLIC BLOOD PRESSURE < 80 MM HG: ICD-10-PCS | Mod: CPTII,S$GLB,, | Performed by: NURSE PRACTITIONER

## 2023-05-17 PROCEDURE — 3008F BODY MASS INDEX DOCD: CPT | Mod: CPTII,S$GLB,, | Performed by: NURSE PRACTITIONER

## 2023-05-17 RX ORDER — ALBUTEROL SULFATE 90 UG/1
2 AEROSOL, METERED RESPIRATORY (INHALATION) EVERY 4 HOURS PRN
Qty: 18 G | Refills: 11 | Status: SHIPPED | OUTPATIENT
Start: 2023-05-17 | End: 2023-10-17

## 2023-05-17 RX ORDER — FLUTICASONE FUROATE, UMECLIDINIUM BROMIDE AND VILANTEROL TRIFENATATE 200; 62.5; 25 UG/1; UG/1; UG/1
1 POWDER RESPIRATORY (INHALATION) DAILY
Qty: 60 EACH | Refills: 11 | Status: SHIPPED | OUTPATIENT
Start: 2023-05-17 | End: 2023-10-17

## 2023-05-17 NOTE — PATIENT INSTRUCTIONS
Starting new medication  Trelegy 200 1 puff once a day every day, rinse mouth after using due to risk for thrush if mouth or tongue has white sores contact clinic    Albuterol Inhaler 1-2 puffs every 4 hours, for cough or shortness of breath    Lung function test and chest x-ray to evaluate lung strength

## 2023-05-17 NOTE — PROGRESS NOTES
2023    Uma Bangura  New Patient Consult    Chief Complaint   Patient presents with    Shortness of Breath     With exertion     Wheezing     With exertion        HPI: 2023- complaint of shortness of breath onset 6 months following COVID 19 infection treated outpatient. Had bronchitis with coarse cough and worsening shortness of breath 4 months prior treated with systemic steroids.   Benefited greatly while on steroid therapy.   SOB- only with exertion, improves with rest. Associated with wheeze, No current complaint of cough, no complaint of chest tightness.     Social Hx: lives with family and pet dogs, currently working as teacher, no known Asbestosis exposure, no Smoking Hx  Family Hx: no Lung Cancer, Grandmother and Father COPD, Cousins Asthma  Medical Hx: previous pneumonia ; previous shoulder/chest surgery      The chief compliant  problem is new to me  PFSH:  Past Medical History:   Diagnosis Date    Anxiety state, unspecified     Disturbance of salivary secretion     Edema     GERD (gastroesophageal reflux disease)     Goiter, unspecified     Nonspecific elevation of levels of transaminase or lactic acid dehydrogenase (LDH)     Polyphagia(783.6)     Unspecified sleep apnea     Varices of other sites          Past Surgical History:   Procedure Laterality Date     SECTION      x 2    ENDOMETRIAL ABLATION      OOPHORECTOMY Left     Dermoid cyst    TUBAL LIGATION       Social History     Tobacco Use    Smoking status: Never    Smokeless tobacco: Never   Substance Use Topics    Alcohol use: Yes     Comment: occasionally    Drug use: No     Family History   Problem Relation Age of Onset    Cancer Mother         thyroid    Hypertension Mother     Diabetes Father     Hypertension Father      Review of patient's allergies indicates:   Allergen Reactions    Sertraline      Other reaction(s): heartburn     I have reviewed past medical, family, and social history. I have reviewed previous  "nurse notes.        Performance Status:The patient's activity level is regular exercise.      Review of Systems:  a review of eleven systems covering constitutional, Eye, HEENT, Psych, Respiratory, Cardiac, GI, , Musculoskeletal, Endocrine, Dermatologic was negative except for pertinent findings as listed ABOVE and below: pertinent positive as above, rest is good  Fatigue  Shortness of breath         Exam:Comprehensive exam done. /79 (BP Location: Left arm, Patient Position: Sitting, BP Method: Large (Automatic))   Pulse 79   Ht 5' 6" (1.676 m)   Wt 108.4 kg (238 lb 15.7 oz)   LMP 05/06/2023 (Exact Date)   SpO2 97% Comment: room air at rest  BMI 38.57 kg/m²   Exam included Vitals as listed, and patient's appearance and affect and alertness and mood, oral exam for yeast and hygiene and pharynx lesions and Mallapatti (M) score, neck with inspection for jvd and masses and thyroid abnormalities and lymph nodes (supraclavicular and infraclavicular nodes and axillary also examined and noted if abn), chest exam included symmetry and effort and fremitus and percussion and auscultation, cardiac exam included rhythm and gallops and murmur and rubs and jvd and edema, abdominal exam for mass and hepatosplenomegaly and tenderness and hernias and bowel sounds, Musculoskeletal exam with muscle tone and posture and mobility/gait and  strength, and skin for rashes and cyanosis and pallor and turgor, extremity for clubbing.  Findings were normal except for pertinent findings listed below:   BS clear  M2      Radiographs (ct chest and cxr) reviewed: was not done       Labs reviewed       Lab Results   Component Value Date    WBC 7.98 05/10/2023    RBC 4.79 05/10/2023    HGB 13.8 05/10/2023    HCT 42.0 05/10/2023    MCV 88 05/10/2023    MCH 28.8 05/10/2023    MCHC 32.9 05/10/2023    RDW 12.9 05/10/2023     05/10/2023    MPV 10.5 05/10/2023    GRAN 4.2 05/10/2023    GRAN 52.5 05/10/2023    LYMPH 3.1 05/10/2023 "    LYMPH 39.2 05/10/2023    MONO 0.5 05/10/2023    MONO 5.9 05/10/2023    EOS 0.1 05/10/2023    BASO 0.06 05/10/2023    EOSINOPHIL 1.3 05/10/2023    BASOPHIL 0.8 05/10/2023      Latest Reference Range & Units 05/10/23 11:43   TSH 0.400 - 4.000 uIU/mL 1.809   T3, Total 60 - 180 ng/dL 120   Free T4 0.71 - 1.51 ng/dL 1.15   Thyroperoxidase Antibodies <6.0 IU/mL <6.0        Latest Reference Range & Units 03/08/23 17:00 05/10/23 11:43   CO2 23 - 29 mmol/L 25 25     PFT will be done and results to be reviewed  Pulmonary Functions Testing Results:        Plan:  Clinical impression is apparently straight forward and impression with management as below.    Uma was seen today for shortness of breath and wheezing.    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 Rescue  -     fluticasone-umeclidin-vilanter (TRELEGY ELLIPTA) 200-62.5-25 mcg inhaler; Inhale 1 puff into the lungs once daily.  -     Complete PFT with bronchodilator; Future  -     X-Ray Chest PA And Lateral; Future    SOB (shortness of breath) on exertion  -     Ambulatory referral/consult to Pulmonology  -     albuterol (VENTOLIN HFA) 90 mcg/actuation inhaler; Inhale 2 puffs into the lungs every 4 (four) hours as needed for Wheezing or Shortness of Breath. Rescue  -     fluticasone-umeclidin-vilanter (TRELEGY ELLIPTA) 200-62.5-25 mcg inhaler; Inhale 1 puff into the lungs once daily.  -     Complete PFT with bronchodilator; Future  -     X-Ray Chest PA And Lateral; Future        Follow up in about 3 months (around 8/17/2023), or if symptoms worsen or fail to improve.      Discussed with patient above for education the following:      Patient Instructions   Starting new medication  Trelegy 200 1 puff once a day every day, rinse mouth after using due to risk  for thrush if mouth or tongue has white sores contact clinic    Albuterol Inhaler 1-2 puffs every 4 hours, for cough or shortness of breath    Lung function test and chest x-ray to evaluate lung strength

## 2023-05-23 ENCOUNTER — OFFICE VISIT (OUTPATIENT)
Dept: OBSTETRICS AND GYNECOLOGY | Facility: CLINIC | Age: 43
End: 2023-05-23
Payer: COMMERCIAL

## 2023-05-23 VITALS
DIASTOLIC BLOOD PRESSURE: 78 MMHG | BODY MASS INDEX: 38.41 KG/M2 | HEIGHT: 66 IN | WEIGHT: 239 LBS | SYSTOLIC BLOOD PRESSURE: 136 MMHG | RESPIRATION RATE: 18 BRPM

## 2023-05-23 DIAGNOSIS — N93.9 ABNORMAL UTERINE BLEEDING (AUB): Primary | ICD-10-CM

## 2023-05-23 DIAGNOSIS — N95.1 MENOPAUSAL SYMPTOMS: ICD-10-CM

## 2023-05-23 DIAGNOSIS — R63.5 WEIGHT GAIN: ICD-10-CM

## 2023-05-23 DIAGNOSIS — D50.0 IRON DEFICIENCY ANEMIA DUE TO CHRONIC BLOOD LOSS: ICD-10-CM

## 2023-05-23 DIAGNOSIS — R68.82 DECREASED LIBIDO: ICD-10-CM

## 2023-05-23 PROCEDURE — 99214 OFFICE O/P EST MOD 30 MIN: CPT | Mod: S$GLB,,, | Performed by: OBSTETRICS & GYNECOLOGY

## 2023-05-23 PROCEDURE — 3078F DIAST BP <80 MM HG: CPT | Mod: CPTII,S$GLB,, | Performed by: OBSTETRICS & GYNECOLOGY

## 2023-05-23 PROCEDURE — 3078F PR MOST RECENT DIASTOLIC BLOOD PRESSURE < 80 MM HG: ICD-10-PCS | Mod: CPTII,S$GLB,, | Performed by: OBSTETRICS & GYNECOLOGY

## 2023-05-23 PROCEDURE — 1159F MED LIST DOCD IN RCRD: CPT | Mod: CPTII,S$GLB,, | Performed by: OBSTETRICS & GYNECOLOGY

## 2023-05-23 PROCEDURE — 3044F HG A1C LEVEL LT 7.0%: CPT | Mod: CPTII,S$GLB,, | Performed by: OBSTETRICS & GYNECOLOGY

## 2023-05-23 PROCEDURE — 3008F BODY MASS INDEX DOCD: CPT | Mod: CPTII,S$GLB,, | Performed by: OBSTETRICS & GYNECOLOGY

## 2023-05-23 PROCEDURE — 3008F PR BODY MASS INDEX (BMI) DOCUMENTED: ICD-10-PCS | Mod: CPTII,S$GLB,, | Performed by: OBSTETRICS & GYNECOLOGY

## 2023-05-23 PROCEDURE — 3044F PR MOST RECENT HEMOGLOBIN A1C LEVEL <7.0%: ICD-10-PCS | Mod: CPTII,S$GLB,, | Performed by: OBSTETRICS & GYNECOLOGY

## 2023-05-23 PROCEDURE — 99999 PR PBB SHADOW E&M-EST. PATIENT-LVL III: CPT | Mod: PBBFAC,,, | Performed by: OBSTETRICS & GYNECOLOGY

## 2023-05-23 PROCEDURE — 3075F SYST BP GE 130 - 139MM HG: CPT | Mod: CPTII,S$GLB,, | Performed by: OBSTETRICS & GYNECOLOGY

## 2023-05-23 PROCEDURE — 1159F PR MEDICATION LIST DOCUMENTED IN MEDICAL RECORD: ICD-10-PCS | Mod: CPTII,S$GLB,, | Performed by: OBSTETRICS & GYNECOLOGY

## 2023-05-23 PROCEDURE — 3075F PR MOST RECENT SYSTOLIC BLOOD PRESS GE 130-139MM HG: ICD-10-PCS | Mod: CPTII,S$GLB,, | Performed by: OBSTETRICS & GYNECOLOGY

## 2023-05-23 PROCEDURE — 99214 PR OFFICE/OUTPT VISIT, EST, LEVL IV, 30-39 MIN: ICD-10-PCS | Mod: S$GLB,,, | Performed by: OBSTETRICS & GYNECOLOGY

## 2023-05-23 PROCEDURE — 99999 PR PBB SHADOW E&M-EST. PATIENT-LVL III: ICD-10-PCS | Mod: PBBFAC,,, | Performed by: OBSTETRICS & GYNECOLOGY

## 2023-05-23 NOTE — PROGRESS NOTES
Chief Complaint   Patient presents with    Follow-up     Lab and u/s results       History and Physical:  Patient's last menstrual period was 2023 (exact date).    Contraception:  Bilateral tubal ligation    Uma Bangura is a 43 y.o.  who presents today for her routine annual GYN exam.   The patient reports a history of endometrial ablation for abnormal uterine bleeding.  She now has some irregular cycles and was recently diagnosed with iron deficiency anemia.    She had a recent mammogram   No family history of colon cancers.  She does report some recent hair loss issues.    Date: 10/20/2022    The patient presents today for follow-up.  She was last seen as above.  In light of the above and her anemia lab work and pelvic ultrasound were ordered.    The patient presents today to discuss results of the above   She reports no bleeding since our last evaluation.    Date: 5/10/2023    The patient presents today for follow-up.  She was last seen as above.    The patient is currently being followed as above.    Since her last evaluation she is continued to have irregular menstrual periods.  She denies any pelvic pain.    She is concerned regarding weight gain     In addition over the last 4 months she is had issues with poor sleep as well as shortness of breath which increases with exertion.  No chest pain.  No other cardiac or pulmonary issues.    Date: 2023    The patient presents today for follow-up.  She was last seen as above.  In light of the above issues, primarily weight bleeding and decreased libido lab work and ultrasound were obtained.    The patient presents today to discuss the results as well as further evaluation and treatment options.      Following her last evaluation she was seen by pulmonology and workup in regards to her shortness of breath is in effect.    Allergies:   Review of patient's allergies indicates:   Allergen Reactions    Sertraline      Other reaction(s):  heartburn       Past Medical History:   Diagnosis Date    Anxiety state, unspecified     Disturbance of salivary secretion     Edema     GERD (gastroesophageal reflux disease)     Goiter, unspecified     Nonspecific elevation of levels of transaminase or lactic acid dehydrogenase (LDH)     Polyphagia(783.6)     Unspecified sleep apnea     Varices of other sites        Past Surgical History:   Procedure Laterality Date     SECTION      x 2    ENDOMETRIAL ABLATION      OOPHORECTOMY Left     Dermoid cyst    TUBAL LIGATION         MEDS:   Current Outpatient Medications:     albuterol (VENTOLIN HFA) 90 mcg/actuation inhaler, Inhale 2 puffs into the lungs every 4 (four) hours as needed for Wheezing or Shortness of Breath. Rescue, Disp: 18 g, Rfl: 11    cycloSPORINE (RESTASIS) 0.05 % ophthalmic emulsion, Place 1 drop into both eyes 2 (two) times daily., Disp: , Rfl:     ferrous sulfate 325 (65 FE) MG EC tablet, TAKE ONE TABLET BY MOUTH EVERY DAY, Disp: 90 tablet, Rfl: 1    fluticasone-umeclidin-vilanter (TRELEGY ELLIPTA) 200-62.5-25 mcg inhaler, Inhale 1 puff into the lungs once daily., Disp: 60 each, Rfl: 11    magnesium gluconate (MAG-G) 27 mg magnesium (500 mg) Tab tablet, Take 27 mg by mouth once., Disp: , Rfl:     topiramate (TOPAMAX) 50 MG tablet, Take 50 mg by mouth 2 (two) times daily., Disp: , Rfl:      OB History          2    Para   2    Term   2            AB        Living   2         SAB        IAB        Ectopic        Multiple        Live Births   2           Obstetric Comments   Caesarean section x2               Social History     Socioeconomic History    Marital status:    Tobacco Use    Smoking status: Never    Smokeless tobacco: Never   Substance and Sexual Activity    Alcohol use: Yes     Comment: occasionally    Drug use: No    Sexual activity: Yes     Partners: Male     Birth control/protection: See Surgical Hx     Comment:        Family History   Problem Relation  "Age of Onset    Cancer Mother         thyroid    Hypertension Mother     Diabetes Father     Hypertension Father          Past medical and surgical history reviewed.   I have reviewed the patient's medical history in detail and updated the computerized patient record.      Review of Systems (at today's evaluation)  Review of Systems   Constitutional:  Negative for fever and unexpected weight change.   Respiratory:  Positive for shortness of breath. Negative for cough.    Cardiovascular:  Negative for chest pain.   Gastrointestinal:  Negative for constipation, diarrhea and nausea.   Genitourinary:  Negative for dysuria and frequency.          GYN AS PER HPI   Musculoskeletal: Negative.    Skin: Negative.    Neurological: Negative.    Psychiatric/Behavioral:  Positive for sleep disturbance.       Physical Exam:   /78 (BP Location: Right arm, Patient Position: Sitting, BP Method: Medium (Manual))   Resp 18   Ht 5' 6" (1.676 m)   Wt 108.4 kg (238 lb 15.7 oz)   LMP 05/06/2023 (Exact Date)   BMI 38.57 kg/m²       Physical Exam:   Constitutional: She appears well-developed and well-nourished.    HENT:   Head: Normocephalic.     Neck: No thyroid mass present.    Cardiovascular:  Normal rate.             Pulmonary/Chest: Effort normal and breath sounds normal. No respiratory distress. She has no wheezes. She has no rales.        Abdominal: Soft. There is no abdominal tenderness.             Musculoskeletal: Normal range of motion.      Right lower leg: No edema.      Left lower leg: No edema.       Neurological: She is alert.   No gross lesions noted.    Skin: Skin is warm and dry.    Psychiatric: She has a normal mood and affect. Her speech is normal and behavior is normal. Mood normal.        Recent Laboratory Results:  Results    Collected Updated Procedure    05/10/2023 1143 05/10/2023 1203 CBC Auto Differential [[id number]]   Blood    Component Value Units   WBC 7.98 K/uL   RBC 4.79 M/uL   Hemoglobin 13.8 g/dL "   Hematocrit 42.0 %   MCV 88 fL   MCH 28.8 pg   MCHC 32.9 g/dL   RDW 12.9 %   Platelets 252 K/uL   MPV 10.5 fL   Immature Granulocytes 0.3 %   Gran # (ANC) 4.2 K/uL   Immature Grans (Abs) 0.02  K/uL   Lymph # 3.1 K/uL   Mono # 0.5 K/uL   Eos # 0.1 K/uL   Baso # 0.06 K/uL   nRBC 0 /100 WBC   Gran % 52.5 %   Lymph % 39.2 %   Mono % 5.9 %   Eosinophil % 1.3 %   Basophil % 0.8 %   Differential Method Automated           05/10/2023 1143 05/10/2023 1336 Ferritin [[id number]]   Blood    Component Value Units   Ferritin 39 ng/mL          05/10/2023 1143 05/10/2023 1203 Reticulocytes [[id number]]   Blood    Component Value Units   Retic 1.1 %          05/10/2023 1143 05/10/2023 1237 Comprehensive Metabolic Panel [[id number]]   (Abnormal)   Blood    Component Value Units   Sodium 142 mmol/L   Potassium 4.1 mmol/L   Chloride 108 mmol/L   CO2 25 mmol/L   Glucose 89 mg/dL   BUN 15 mg/dL   Creatinine 1.2 mg/dL   Calcium 9.8 mg/dL   Total Protein 7.1 g/dL   Albumin 4.0 g/dL   Total Bilirubin 0.4  mg/dL   Alkaline Phosphatase 70 U/L   AST 19 U/L   ALT 17 U/L   Anion Gap 9 mmol/L   eGFR 57 Abnormal  mL/min/1.73 m^2          05/10/2023 1143 05/10/2023 1836 Insulin, Random [[id number]]   Blood    Component Value Units   Insulin 16.9 uU/mL   Insulin Collection Interval blood           05/10/2023 1143 05/10/2023 1252 TSH [[id number]]   Blood    Component Value Units   TSH 1.809 uIU/mL          05/10/2023 1143 05/10/2023 1252 T4, Free [[id number]]   Blood    Component Value Units   Free T4 1.15 ng/dL          05/10/2023 1143 05/10/2023 1910 Thyroid Peroxidase Antibody [[id number]]   Blood    Component Value Units   Thyroperoxidase Antibodies <6.0 IU/mL          05/10/2023 1143 05/10/2023 2105 T3 [[id number]]   Blood    Component Value Units   T3, Total 120 ng/dL          05/10/2023 1143 05/10/2023 2007 Estradiol [[id number]]   Blood    Component Value Units   Estradiol 23  pg/mL          05/10/2023 1143 05/17/2023 1514 Testosterone Panel  [[id number]]   Blood    Component Value Units   Testosterone 10  ng/dL   Testosterone, Free 1.0 pg/mL   Testosterone, Bioavailable 2.0 ng/dL   Sex Hormone Binding Globulin 40 nmol/L   Albumin 4.3 g/dL          05/10/2023 1143 05/10/2023 2007 Follicle Stimulating Hormone [[id number]]   Blood    Component Value Units   Follicle Stimulating Hormone 25.22  mIU/mL          05/10/2023 1143 05/10/2023 2007 Luteinizing Hormone [[id number]]   Blood    Component Value Units   LH 5.7  mIU/mL              Recent Radiology Results:    5/11/2023 Routine     Narrative & Impression  EXAMINATION:  US PELVIS COMP WITH TRANSVAG NON-OB (XPD)     CLINICAL HISTORY:  Abnormal uterine and vaginal bleeding, unspecified     FINDINGS:  Uterus:     Size: 7.4 cm     Masses: None     Endometrium: Measured at 3.5 mm.  Thin sliver like collection of fluid noted in the cervix and adjacent lower uterine segment     Right ovary:     Not visualized and history of oophorectomy     Left ovary:     2.8 x 1.4 x 2.8 cm.     Free Fluid:     Small amount of free fluid not more so than can be seen on a physiologic basis     Impression:     Thin sliver like collection of fluid noted in the cervix and lower uterine segment.  Endometrial canal measured at 3.5 mm.  Visualization of the right ovary.  Unremarkable appearance of the left ovary.      Assessment:   The primary encounter diagnosis was Abnormal uterine bleeding (AUB). Diagnoses of Menopausal symptoms, Iron deficiency anemia due to chronic blood loss, Weight gain, and Decreased libido were also pertinent to this visit.     Plan:   Abnormal uterine bleeding (AUB)    Menopausal symptoms  -     Luteinizing Hormone; Future; Expected date: 05/27/2023  -     Follicle Stimulating Hormone; Future; Expected date: 05/27/2023  -     Testosterone Panel; Future; Expected date: 05/27/2023  -     Estradiol; Future; Expected date: 05/27/2023    Iron deficiency anemia due to chronic blood loss    Weight gain    Decreased  libido      Follow up in about 3 months (around 8/23/2023).     The above was reviewed and discussed with the patient.    We reviewed the results of her laboratory evaluation which placed her perimenopausal or possibly early menopause.    We discussed her low levels of testosterone.  We discussed the overall benign appearance of the pelvic organs an ultrasound with the exception of a small fluid collection.      Options reviewed and at this time will proceed as follows:  The patient will be started on a topical testosterone via Compounded Topical HRT Rx: Testosterone (1 mg) / gram.  Apply 1 g as directed daily.  Sig:  Apply 1 g to skin topically as directed.  Dispense # 90 day supply.  Refills:  1.  The pros, cons, risks, benefits, alternatives and indications of the medication(s) prescribed, as well as appropriate use and potential side effects were discussed.  We will plan repeat laboratory evaluation approximally 3 months.      The patient's questions regarding the above were answered and she is in agreement with this plan.

## 2023-07-12 ENCOUNTER — PATIENT MESSAGE (OUTPATIENT)
Dept: OBSTETRICS AND GYNECOLOGY | Facility: CLINIC | Age: 43
End: 2023-07-12
Payer: COMMERCIAL

## 2023-07-12 DIAGNOSIS — Z12.39 ENCOUNTER FOR SCREENING FOR MALIGNANT NEOPLASM OF BREAST, UNSPECIFIED SCREENING MODALITY: Primary | ICD-10-CM

## 2023-07-27 ENCOUNTER — HOSPITAL ENCOUNTER (OUTPATIENT)
Dept: RADIOLOGY | Facility: HOSPITAL | Age: 43
Discharge: HOME | End: 2023-07-27
Attending: OBSTETRICS & GYNECOLOGY
Payer: COMMERCIAL

## 2023-07-27 DIAGNOSIS — Z12.39 ENCOUNTER FOR SCREENING FOR MALIGNANT NEOPLASM OF BREAST, UNSPECIFIED SCREENING MODALITY: ICD-10-CM

## 2023-07-27 PROCEDURE — 77063 MAMMO DIGITAL SCREENING BILAT WITH TOMO: ICD-10-PCS | Mod: 26,,, | Performed by: RADIOLOGY

## 2023-07-27 PROCEDURE — 77067 MAMMO DIGITAL SCREENING BILAT WITH TOMO: ICD-10-PCS | Mod: 26,,, | Performed by: RADIOLOGY

## 2023-07-27 PROCEDURE — 77067 SCR MAMMO BI INCL CAD: CPT | Mod: 26,,, | Performed by: RADIOLOGY

## 2023-07-27 PROCEDURE — 77063 BREAST TOMOSYNTHESIS BI: CPT | Mod: 26,,, | Performed by: RADIOLOGY

## 2023-07-27 PROCEDURE — 77067 SCR MAMMO BI INCL CAD: CPT | Mod: TC

## 2023-08-21 ENCOUNTER — TELEPHONE (OUTPATIENT)
Dept: OBSTETRICS AND GYNECOLOGY | Facility: CLINIC | Age: 43
End: 2023-08-21
Payer: COMMERCIAL

## 2023-08-21 NOTE — TELEPHONE ENCOUNTER
----- Message from Evelyn Salas sent at 8/21/2023  2:48 PM CDT -----  Regarding: Call back  Type:  Needs Medical Advice    Who Called: Pt      Would the patient rather a call back or a response via [facility]? Callback    Best Call Back Number: [phone]    Additional Information: Pt would like a call back. Thank you

## 2023-09-02 ENCOUNTER — LAB VISIT (OUTPATIENT)
Dept: LAB | Facility: HOSPITAL | Age: 43
End: 2023-09-02
Attending: FAMILY MEDICINE
Payer: COMMERCIAL

## 2023-09-02 DIAGNOSIS — D50.9 IRON DEFICIENCY ANEMIA, UNSPECIFIED IRON DEFICIENCY ANEMIA TYPE: ICD-10-CM

## 2023-09-02 DIAGNOSIS — E78.5 HYPERLIPIDEMIA, UNSPECIFIED HYPERLIPIDEMIA TYPE: ICD-10-CM

## 2023-09-02 LAB
ALBUMIN SERPL BCP-MCNC: 3.7 G/DL (ref 3.5–5.2)
ALP SERPL-CCNC: 69 U/L (ref 55–135)
ALT SERPL W/O P-5'-P-CCNC: 23 U/L (ref 10–44)
ANION GAP SERPL CALC-SCNC: 12 MMOL/L (ref 8–16)
AST SERPL-CCNC: 27 U/L (ref 10–40)
BASOPHILS # BLD AUTO: 0.03 K/UL (ref 0–0.2)
BASOPHILS NFR BLD: 0.5 % (ref 0–1.9)
BILIRUB SERPL-MCNC: 0.4 MG/DL (ref 0.1–1)
BUN SERPL-MCNC: 15 MG/DL (ref 6–20)
CALCIUM SERPL-MCNC: 9.2 MG/DL (ref 8.7–10.5)
CHLORIDE SERPL-SCNC: 105 MMOL/L (ref 95–110)
CHOLEST SERPL-MCNC: 167 MG/DL (ref 120–199)
CHOLEST/HDLC SERPL: 3.6 {RATIO} (ref 2–5)
CO2 SERPL-SCNC: 24 MMOL/L (ref 23–29)
CREAT SERPL-MCNC: 1 MG/DL (ref 0.5–1.4)
DIFFERENTIAL METHOD: ABNORMAL
EOSINOPHIL # BLD AUTO: 0.1 K/UL (ref 0–0.5)
EOSINOPHIL NFR BLD: 1.4 % (ref 0–8)
ERYTHROCYTE [DISTWIDTH] IN BLOOD BY AUTOMATED COUNT: 12.6 % (ref 11.5–14.5)
EST. GFR  (NO RACE VARIABLE): >60 ML/MIN/1.73 M^2
FERRITIN SERPL-MCNC: 99 NG/ML (ref 20–300)
GLUCOSE SERPL-MCNC: 95 MG/DL (ref 70–110)
HCT VFR BLD AUTO: 39.3 % (ref 37–48.5)
HDLC SERPL-MCNC: 46 MG/DL (ref 40–75)
HDLC SERPL: 27.5 % (ref 20–50)
HGB BLD-MCNC: 12.7 G/DL (ref 12–16)
IMM GRANULOCYTES # BLD AUTO: 0.01 K/UL (ref 0–0.04)
IMM GRANULOCYTES NFR BLD AUTO: 0.2 % (ref 0–0.5)
IRON SERPL-MCNC: 71 UG/DL (ref 30–160)
LDLC SERPL CALC-MCNC: 95.6 MG/DL (ref 63–159)
LYMPHOCYTES # BLD AUTO: 2.9 K/UL (ref 1–4.8)
LYMPHOCYTES NFR BLD: 51.5 % (ref 18–48)
MCH RBC QN AUTO: 28.8 PG (ref 27–31)
MCHC RBC AUTO-ENTMCNC: 32.3 G/DL (ref 32–36)
MCV RBC AUTO: 89 FL (ref 82–98)
MONOCYTES # BLD AUTO: 0.3 K/UL (ref 0.3–1)
MONOCYTES NFR BLD: 5.4 % (ref 4–15)
NEUTROPHILS # BLD AUTO: 2.3 K/UL (ref 1.8–7.7)
NEUTROPHILS NFR BLD: 41 % (ref 38–73)
NONHDLC SERPL-MCNC: 121 MG/DL
NRBC BLD-RTO: 0 /100 WBC
PLATELET # BLD AUTO: 246 K/UL (ref 150–450)
PMV BLD AUTO: 10.6 FL (ref 9.2–12.9)
POTASSIUM SERPL-SCNC: 4.1 MMOL/L (ref 3.5–5.1)
PROT SERPL-MCNC: 6.6 G/DL (ref 6–8.4)
RBC # BLD AUTO: 4.41 M/UL (ref 4–5.4)
SATURATED IRON: 21 % (ref 20–50)
SODIUM SERPL-SCNC: 141 MMOL/L (ref 136–145)
TOTAL IRON BINDING CAPACITY: 339 UG/DL (ref 250–450)
TRANSFERRIN SERPL-MCNC: 229 MG/DL (ref 200–375)
TRIGL SERPL-MCNC: 127 MG/DL (ref 30–150)
WBC # BLD AUTO: 5.69 K/UL (ref 3.9–12.7)

## 2023-09-02 PROCEDURE — 82728 ASSAY OF FERRITIN: CPT | Performed by: FAMILY MEDICINE

## 2023-09-02 PROCEDURE — 36415 COLL VENOUS BLD VENIPUNCTURE: CPT | Mod: PO | Performed by: FAMILY MEDICINE

## 2023-09-02 PROCEDURE — 84466 ASSAY OF TRANSFERRIN: CPT | Performed by: FAMILY MEDICINE

## 2023-09-02 PROCEDURE — 85025 COMPLETE CBC W/AUTO DIFF WBC: CPT | Performed by: FAMILY MEDICINE

## 2023-09-02 PROCEDURE — 80061 LIPID PANEL: CPT | Performed by: FAMILY MEDICINE

## 2023-09-02 PROCEDURE — 80053 COMPREHEN METABOLIC PANEL: CPT | Performed by: FAMILY MEDICINE

## 2023-09-02 PROCEDURE — 83540 ASSAY OF IRON: CPT | Performed by: FAMILY MEDICINE

## 2023-10-05 ENCOUNTER — TELEPHONE (OUTPATIENT)
Dept: OBSTETRICS AND GYNECOLOGY | Facility: CLINIC | Age: 43
End: 2023-10-05
Payer: COMMERCIAL

## 2023-10-05 NOTE — TELEPHONE ENCOUNTER
----- Message from Cassandra Kerns sent at 10/5/2023  2:05 PM CDT -----  Type:  Sooner Appointment Request    Caller is requesting a sooner appointment.  Caller declined first available appointment listed below.  Caller will not accept being placed on the waitlist and is requesting a message be sent to doctor.    Name of Caller:  pt   When is the first available appointment?  Oct 30  Symptoms:  annual   Best Call Back Number:  [phone]    Additional Information:  please advise

## 2023-10-05 NOTE — TELEPHONE ENCOUNTER
Contacted pt to schedule annual exam with Dr. Orona. Appt set for 10/17/23 @ 4 pm. Pt voiced understanding.

## 2023-10-17 ENCOUNTER — OFFICE VISIT (OUTPATIENT)
Dept: OBSTETRICS AND GYNECOLOGY | Facility: CLINIC | Age: 43
End: 2023-10-17
Payer: COMMERCIAL

## 2023-10-17 ENCOUNTER — LAB VISIT (OUTPATIENT)
Dept: LAB | Facility: HOSPITAL | Age: 43
End: 2023-10-17
Attending: OBSTETRICS & GYNECOLOGY
Payer: COMMERCIAL

## 2023-10-17 VITALS
WEIGHT: 246.25 LBS | SYSTOLIC BLOOD PRESSURE: 112 MMHG | RESPIRATION RATE: 20 BRPM | DIASTOLIC BLOOD PRESSURE: 86 MMHG | HEIGHT: 66 IN | BODY MASS INDEX: 39.58 KG/M2

## 2023-10-17 DIAGNOSIS — Z12.11 SCREENING FOR COLON CANCER: ICD-10-CM

## 2023-10-17 DIAGNOSIS — L65.9 HAIR LOSS: ICD-10-CM

## 2023-10-17 DIAGNOSIS — L85.3 DRY SKIN: ICD-10-CM

## 2023-10-17 DIAGNOSIS — N95.1 MENOPAUSAL SYMPTOMS: ICD-10-CM

## 2023-10-17 DIAGNOSIS — Z12.4 SCREENING FOR MALIGNANT NEOPLASM OF CERVIX: ICD-10-CM

## 2023-10-17 DIAGNOSIS — Z01.419 WELL WOMAN EXAM WITH ROUTINE GYNECOLOGICAL EXAM: Primary | ICD-10-CM

## 2023-10-17 LAB
ESTRADIOL SERPL-MCNC: 13 PG/ML
FSH SERPL-ACNC: 10.55 MIU/ML
LH SERPL-ACNC: 2.4 MIU/ML
T3 SERPL-MCNC: 95 NG/DL (ref 60–180)
T4 FREE SERPL-MCNC: 0.93 NG/DL (ref 0.71–1.51)
TSH SERPL DL<=0.005 MIU/L-ACNC: 1.29 UIU/ML (ref 0.4–4)

## 2023-10-17 PROCEDURE — 83002 ASSAY OF GONADOTROPIN (LH): CPT | Performed by: OBSTETRICS & GYNECOLOGY

## 2023-10-17 PROCEDURE — 99999 PR PBB SHADOW E&M-EST. PATIENT-LVL III: ICD-10-PCS | Mod: PBBFAC,,, | Performed by: OBSTETRICS & GYNECOLOGY

## 2023-10-17 PROCEDURE — 36415 COLL VENOUS BLD VENIPUNCTURE: CPT | Mod: PO | Performed by: OBSTETRICS & GYNECOLOGY

## 2023-10-17 PROCEDURE — 84403 ASSAY OF TOTAL TESTOSTERONE: CPT | Performed by: OBSTETRICS & GYNECOLOGY

## 2023-10-17 PROCEDURE — 99396 PR PREVENTIVE VISIT,EST,40-64: ICD-10-PCS | Mod: S$GLB,,, | Performed by: OBSTETRICS & GYNECOLOGY

## 2023-10-17 PROCEDURE — 84443 ASSAY THYROID STIM HORMONE: CPT | Performed by: OBSTETRICS & GYNECOLOGY

## 2023-10-17 PROCEDURE — 3008F PR BODY MASS INDEX (BMI) DOCUMENTED: ICD-10-PCS | Mod: CPTII,S$GLB,, | Performed by: OBSTETRICS & GYNECOLOGY

## 2023-10-17 PROCEDURE — 3044F PR MOST RECENT HEMOGLOBIN A1C LEVEL <7.0%: ICD-10-PCS | Mod: CPTII,S$GLB,, | Performed by: OBSTETRICS & GYNECOLOGY

## 2023-10-17 PROCEDURE — 3044F HG A1C LEVEL LT 7.0%: CPT | Mod: CPTII,S$GLB,, | Performed by: OBSTETRICS & GYNECOLOGY

## 2023-10-17 PROCEDURE — 3074F SYST BP LT 130 MM HG: CPT | Mod: CPTII,S$GLB,, | Performed by: OBSTETRICS & GYNECOLOGY

## 2023-10-17 PROCEDURE — 83001 ASSAY OF GONADOTROPIN (FSH): CPT | Performed by: OBSTETRICS & GYNECOLOGY

## 2023-10-17 PROCEDURE — 3079F DIAST BP 80-89 MM HG: CPT | Mod: CPTII,S$GLB,, | Performed by: OBSTETRICS & GYNECOLOGY

## 2023-10-17 PROCEDURE — 99999 PR PBB SHADOW E&M-EST. PATIENT-LVL III: CPT | Mod: PBBFAC,,, | Performed by: OBSTETRICS & GYNECOLOGY

## 2023-10-17 PROCEDURE — 84480 ASSAY TRIIODOTHYRONINE (T3): CPT | Performed by: OBSTETRICS & GYNECOLOGY

## 2023-10-17 PROCEDURE — 99396 PREV VISIT EST AGE 40-64: CPT | Mod: S$GLB,,, | Performed by: OBSTETRICS & GYNECOLOGY

## 2023-10-17 PROCEDURE — 3079F PR MOST RECENT DIASTOLIC BLOOD PRESSURE 80-89 MM HG: ICD-10-PCS | Mod: CPTII,S$GLB,, | Performed by: OBSTETRICS & GYNECOLOGY

## 2023-10-17 PROCEDURE — 84439 ASSAY OF FREE THYROXINE: CPT | Performed by: OBSTETRICS & GYNECOLOGY

## 2023-10-17 PROCEDURE — 86376 MICROSOMAL ANTIBODY EACH: CPT | Performed by: OBSTETRICS & GYNECOLOGY

## 2023-10-17 PROCEDURE — 1159F MED LIST DOCD IN RCRD: CPT | Mod: CPTII,S$GLB,, | Performed by: OBSTETRICS & GYNECOLOGY

## 2023-10-17 PROCEDURE — 84270 ASSAY OF SEX HORMONE GLOBUL: CPT | Performed by: OBSTETRICS & GYNECOLOGY

## 2023-10-17 PROCEDURE — 82670 ASSAY OF TOTAL ESTRADIOL: CPT | Performed by: OBSTETRICS & GYNECOLOGY

## 2023-10-17 PROCEDURE — 87624 HPV HI-RISK TYP POOLED RSLT: CPT | Performed by: OBSTETRICS & GYNECOLOGY

## 2023-10-17 PROCEDURE — 88175 CYTOPATH C/V AUTO FLUID REDO: CPT | Performed by: OBSTETRICS & GYNECOLOGY

## 2023-10-17 PROCEDURE — 1159F PR MEDICATION LIST DOCUMENTED IN MEDICAL RECORD: ICD-10-PCS | Mod: CPTII,S$GLB,, | Performed by: OBSTETRICS & GYNECOLOGY

## 2023-10-17 PROCEDURE — 3074F PR MOST RECENT SYSTOLIC BLOOD PRESSURE < 130 MM HG: ICD-10-PCS | Mod: CPTII,S$GLB,, | Performed by: OBSTETRICS & GYNECOLOGY

## 2023-10-17 PROCEDURE — 3008F BODY MASS INDEX DOCD: CPT | Mod: CPTII,S$GLB,, | Performed by: OBSTETRICS & GYNECOLOGY

## 2023-10-17 NOTE — PROGRESS NOTES
Chief Complaint   Patient presents with    Well Woman     WWE       History and Physical:  Patient's last menstrual period was 09/15/2023 (approximate).    Contraception: BTL & endometrial ablation    Date: 10/17/2023    Uma Bangura is a 43 y.o.  who presents today for her routine annual GYN exam.   The patient has no Gynecology complaints today.   She is concerned regarding recent hair loss issues as well as dry skin.    She would previously been placed on topical testosterone but discontinued this medication due to headaches.    The patient reports no changes in her medical or surgical history since her last evaluation.    Mammogram was in 2023.    Allergies:   Review of patient's allergies indicates:   Allergen Reactions    Sertraline      Other reaction(s): heartburn       Past Medical History:   Diagnosis Date    Anxiety state, unspecified     Disturbance of salivary secretion     Edema     GERD (gastroesophageal reflux disease)     Goiter, unspecified     Nonspecific elevation of levels of transaminase or lactic acid dehydrogenase (LDH)     Polyphagia(783.6)     Unspecified sleep apnea     Varices of other sites        Past Surgical History:   Procedure Laterality Date     SECTION      x 2    ENDOMETRIAL ABLATION      OOPHORECTOMY Left     Dermoid cyst    TUBAL LIGATION         MEDS:   Current Outpatient Medications:     cycloSPORINE (RESTASIS) 0.05 % ophthalmic emulsion, Place 1 drop into both eyes 2 (two) times daily., Disp: , Rfl:     ferrous sulfate 325 (65 FE) MG EC tablet, TAKE ONE TABLET BY MOUTH EVERY DAY, Disp: 90 tablet, Rfl: 1    magnesium gluconate (MAG-G) 27 mg magnesium (500 mg) Tab tablet, Take 27 mg by mouth once., Disp: , Rfl:     albuterol (VENTOLIN HFA) 90 mcg/actuation inhaler, Inhale 2 puffs into the lungs every 4 (four) hours as needed for Wheezing or Shortness of Breath. Rescue, Disp: 18 g, Rfl: 11    fluticasone-umeclidin-vilanter (TRELEGY ELLIPTA)  "200-62.5-25 mcg inhaler, Inhale 1 puff into the lungs once daily., Disp: 60 each, Rfl: 11     OB History          2    Para   2    Term   2            AB        Living   2         SAB        IAB        Ectopic        Multiple        Live Births   2           Obstetric Comments   Caesarean section x2               Social History     Tobacco Use    Smoking status: Never    Smokeless tobacco: Never   Substance Use Topics    Alcohol use: Yes     Comment: occasionally    Drug use: No       Family History   Problem Relation Age of Onset    Cancer Mother         thyroid    Hypertension Mother     Diabetes Father     Hypertension Father        Past medical and surgical history reviewed.   I have reviewed the patient's medical history in detail and updated the computerized patient record.    Review of Systems (at today's evaluation)  Review of Systems   Constitutional:  Negative for fever and unexpected weight change.   Respiratory:  Negative for cough and shortness of breath.    Cardiovascular:  Negative for chest pain.   Gastrointestinal:  Negative for constipation, diarrhea and nausea.   Genitourinary:  Negative for dysuria and frequency.          GYN AS PER HPI   Musculoskeletal: Negative.    Skin:  Positive for rash (dry skin).   Neurological: Negative.         Physical Exam:   /86 (BP Location: Right arm, Patient Position: Sitting, BP Method: Medium (Manual))   Resp 20   Ht 5' 6" (1.676 m)   Wt 111.7 kg (246 lb 4.1 oz)   LMP 09/15/2023 (Approximate)   BMI 39.75 kg/m²       Physical Exam:   Constitutional: She appears well-developed and well-nourished.    HENT:   Head: Normocephalic.     Neck: No thyroid mass present.    Cardiovascular:  Normal rate.             Pulmonary/Chest: Effort normal. No respiratory distress.   Breast exam declined by patient.  No masses on breast self-exam or other breast issues.        Abdominal: Soft. There is no abdominal tenderness.     Genitourinary:    Inguinal " canal, vagina, uterus, right adnexa and left adnexa normal.      Pelvic exam was performed with patient supine.   The external female genitalia was normal.   No external genitalia lesions identified,Cervix is normal. Right adnexum displays no mass and no tenderness. Left adnexum displays no mass and no tenderness. No tenderness or bleeding in the vagina. Uterus is not tender. Normal urethral meatus.Urethra findings: no tendernessBladder findings: no bladder tenderness          Musculoskeletal: Normal range of motion.      Right lower leg: No edema.      Left lower leg: No edema.      Lymphadenopathy:     She has no cervical adenopathy. No inguinal adenopathy noted on the right or left side.    Neurological: She is alert.    Skin: Skin is warm and dry.    Psychiatric: Mood normal.        Assessment:        1. Well woman exam with routine gynecological exam    2. Hair loss    3. Screening for malignant neoplasm of cervix    4. Dry skin    5. Screening for colon cancer         Plan:      Well woman exam with routine gynecological exam    Hair loss  -     Testosterone Panel; Future; Expected date: 10/17/2023    Screening for malignant neoplasm of cervix  -     HPV High Risk Genotypes, PCR  -     Liquid-Based Pap Smear, Screening    Dry skin  -     T3; Future; Expected date: 10/17/2023  -     Thyroid Peroxidase Antibody; Future; Expected date: 10/17/2023  -     T4, Free; Future; Expected date: 10/17/2023  -     TSH; Future; Expected date: 10/17/2023  -     Ambulatory referral/consult to Dermatology; Future; Expected date: 10/24/2023    Screening for colon cancer  -     Case Request Endoscopy: COLONOSCOPY      Follow up for as needed / for any GYN related issues.     The above was reviewed and discussed with the patient.    Annual exam and screening issues based on the patient's age, medical history and family history were reviewed / discussed.  Routine health maintenance issues were reviewed and discussed.      In  regards to the patient's hair and skin concerns thyroid testing as well as testosterone have been ordered.  In addition a referral to Dermatology was placed.    From a gynecologic standpoint the patient is currently doing well without complaints.    The patient's questions were answered, and she is in agreement with the current plan.     Zane Orona MD  Department OBGYN  [facility]

## 2023-10-18 ENCOUNTER — TELEPHONE (OUTPATIENT)
Dept: FAMILY MEDICINE | Facility: CLINIC | Age: 43
End: 2023-10-18
Payer: COMMERCIAL

## 2023-10-18 LAB — THYROPEROXIDASE IGG SERPL-ACNC: <6 IU/ML

## 2023-10-18 NOTE — TELEPHONE ENCOUNTER
Called and spoke to pt about setting up Dermatology appt p/Referral  Says will call her Insurance for an External provider since she cannot be seen with [facility] as soon as she needs  Also, req to reschedule missed appt w/Dr Pillai from Sept, appt set up for 10/19/2023 @4:00pm w/[facility] in [place]

## 2023-10-19 ENCOUNTER — OFFICE VISIT (OUTPATIENT)
Dept: FAMILY MEDICINE | Facility: CLINIC | Age: 43
End: 2023-10-19
Payer: COMMERCIAL

## 2023-10-19 VITALS
RESPIRATION RATE: 17 BRPM | BODY MASS INDEX: 39.96 KG/M2 | OXYGEN SATURATION: 97 % | TEMPERATURE: 98 F | WEIGHT: 247.56 LBS | DIASTOLIC BLOOD PRESSURE: 82 MMHG | HEART RATE: 81 BPM | SYSTOLIC BLOOD PRESSURE: 116 MMHG

## 2023-10-19 DIAGNOSIS — L85.3 DRY SKIN: ICD-10-CM

## 2023-10-19 DIAGNOSIS — E04.1 THYROID NODULE: ICD-10-CM

## 2023-10-19 DIAGNOSIS — R60.0 PEDAL EDEMA: ICD-10-CM

## 2023-10-19 DIAGNOSIS — D64.9 NORMOCYTIC ANEMIA: Primary | ICD-10-CM

## 2023-10-19 DIAGNOSIS — R51.9 NONINTRACTABLE HEADACHE, UNSPECIFIED CHRONICITY PATTERN, UNSPECIFIED HEADACHE TYPE: ICD-10-CM

## 2023-10-19 PROCEDURE — 99999 PR PBB SHADOW E&M-EST. PATIENT-LVL III: CPT | Mod: PBBFAC,,,

## 2023-10-19 PROCEDURE — 1160F RVW MEDS BY RX/DR IN RCRD: CPT | Mod: CPTII,S$GLB,,

## 2023-10-19 PROCEDURE — 3008F PR BODY MASS INDEX (BMI) DOCUMENTED: ICD-10-PCS | Mod: CPTII,S$GLB,,

## 2023-10-19 PROCEDURE — 99999 PR PBB SHADOW E&M-EST. PATIENT-LVL III: ICD-10-PCS | Mod: PBBFAC,,,

## 2023-10-19 PROCEDURE — 3074F SYST BP LT 130 MM HG: CPT | Mod: CPTII,S$GLB,,

## 2023-10-19 PROCEDURE — 3079F PR MOST RECENT DIASTOLIC BLOOD PRESSURE 80-89 MM HG: ICD-10-PCS | Mod: CPTII,S$GLB,,

## 2023-10-19 PROCEDURE — 1160F PR REVIEW ALL MEDS BY PRESCRIBER/CLIN PHARMACIST DOCUMENTED: ICD-10-PCS | Mod: CPTII,S$GLB,,

## 2023-10-19 PROCEDURE — 3044F PR MOST RECENT HEMOGLOBIN A1C LEVEL <7.0%: ICD-10-PCS | Mod: CPTII,S$GLB,,

## 2023-10-19 PROCEDURE — 99214 OFFICE O/P EST MOD 30 MIN: CPT | Mod: S$GLB,,,

## 2023-10-19 PROCEDURE — 1159F MED LIST DOCD IN RCRD: CPT | Mod: CPTII,S$GLB,,

## 2023-10-19 PROCEDURE — 3044F HG A1C LEVEL LT 7.0%: CPT | Mod: CPTII,S$GLB,,

## 2023-10-19 PROCEDURE — 99214 PR OFFICE/OUTPT VISIT, EST, LEVL IV, 30-39 MIN: ICD-10-PCS | Mod: S$GLB,,,

## 2023-10-19 PROCEDURE — 3008F BODY MASS INDEX DOCD: CPT | Mod: CPTII,S$GLB,,

## 2023-10-19 PROCEDURE — 3074F PR MOST RECENT SYSTOLIC BLOOD PRESSURE < 130 MM HG: ICD-10-PCS | Mod: CPTII,S$GLB,,

## 2023-10-19 PROCEDURE — 3079F DIAST BP 80-89 MM HG: CPT | Mod: CPTII,S$GLB,,

## 2023-10-19 PROCEDURE — 1159F PR MEDICATION LIST DOCUMENTED IN MEDICAL RECORD: ICD-10-PCS | Mod: CPTII,S$GLB,,

## 2023-10-19 NOTE — PROGRESS NOTES
Subjective:       Patient ID: Uma Bangura is a 43 y.o. female.    Chief Complaint: No chief complaint on file.      Uam Bangura is a 43 y.o. female with history of thyroid nodule, tension headaches/ migraine presents to clinic for routine 6 month visit. Pt recently had routine lab work, all within normal limits. She sees neurology at [facility] for her headaches/ migraines, and she has tried Topamax and Propanolol without relief. Pt states Topamax caused brain fog and Propanol caused hypotension. She is currently taking Excedrin Tension headache and reports moderate relief. She reports chronic dry skin and eyes. She is currently moisturizing daily and hydrating well. She  use cyclosporine drops for her dry eyes. She is going to try to be seen by [facility] dermatology regarding her skin.     She was recently seen by OBGYN who tested all of her hormone levels and thyroid hormones. All returned within normal limits. She reports a history of thyroid nodules, but she has not seen endocrinology in about 2 years. Additionally endorses some chronic pedal edema bilaterally.     She is also interested in weight loss. She reports improving diet and increasing physical exercise for about 1 year, but has not been able to lose any weight.         Review of Systems   Constitutional:  Negative for activity change, appetite change, chills, fatigue and fever.   Eyes:  Negative for visual disturbance.        Dry eyes   Respiratory:  Negative for cough and shortness of breath.    Cardiovascular:  Positive for leg swelling. Negative for chest pain and palpitations.   Gastrointestinal:  Negative for abdominal pain, constipation, diarrhea and nausea.   Musculoskeletal:  Negative for arthralgias.   Skin:  Negative for rash.        Dry skin    Neurological:  Positive for headaches. Negative for dizziness and light-headedness.   Psychiatric/Behavioral:  Negative for dysphoric mood. The patient is not nervous/anxious.           Past Medical History:   Diagnosis Date    Anxiety state, unspecified     Disturbance of salivary secretion     Edema     GERD (gastroesophageal reflux disease)     Goiter, unspecified     Nonspecific elevation of levels of transaminase or lactic acid dehydrogenase (LDH)     Polyphagia(783.6)     Unspecified sleep apnea     Varices of other sites        Review of patient's allergies indicates:   Allergen Reactions    Sertraline      Other reaction(s): heartburn         Current Outpatient Medications:     cycloSPORINE (RESTASIS) 0.05 % ophthalmic emulsion, Place 1 drop into both eyes 2 (two) times daily., Disp: , Rfl:     ferrous sulfate 325 (65 FE) MG EC tablet, TAKE ONE TABLET BY MOUTH EVERY DAY, Disp: 90 tablet, Rfl: 1    magnesium gluconate (MAG-G) 27 mg magnesium (500 mg) Tab tablet, Take 27 mg by mouth once., Disp: , Rfl:     Objective:        Physical Exam  Vitals reviewed.   Constitutional:       Appearance: Normal appearance. She is not ill-appearing.   HENT:      Head: Normocephalic and atraumatic.   Eyes:      Conjunctiva/sclera: Conjunctivae normal.   Cardiovascular:      Rate and Rhythm: Normal rate and regular rhythm.      Heart sounds: Normal heart sounds.   Pulmonary:      Effort: Pulmonary effort is normal.      Breath sounds: Normal breath sounds.   Abdominal:      General: Bowel sounds are normal.   Musculoskeletal:         General: Normal range of motion.      Cervical back: Normal range of motion and neck supple.      Right lower leg: Edema present.      Left lower leg: Edema present.   Skin:     General: Skin is warm and dry.   Neurological:      Mental Status: She is alert and oriented to person, place, and time.   Psychiatric:         Mood and Affect: Mood normal.         Behavior: Behavior normal.         Thought Content: Thought content normal.           Visit Vitals  /82 (BP Location: Right arm, Patient Position: Sitting, BP Method: Large (Manual))   Pulse 81   Temp 98.1 °F (36.7  °C) (Oral)   Resp 17   Wt 112.3 kg (247 lb 9.2 oz)   SpO2 97%   BMI 39.96 kg/m²      Assessment:         1. Normocytic anemia    2. Dry skin    3. Nonintractable headache, unspecified chronicity pattern, unspecified headache type    4. BMI 39.0-39.9,adult    5. Thyroid nodule    6. Pedal edema        Plan:         Diagnoses and all orders for this visit:    Normocytic anemia    Dry skin  -     Sedimentation rate; Future  -     C-REACTIVE PROTEIN; Future  -     DIANE; Future  -     RHEUMATOID FACTOR; Future  -     Call to make an appointment with [facility]     Nonintractable headache, unspecified chronicity pattern, unspecified headache type        -    Continue Excedrin Tension as needed for headaches     BMI 39.0-39.9,adult  -     Ambulatory referral/consult to Bariatric Medicine; Future  -     Discussed [facility] as an option as well     Thyroid nodule        -    Call to follow up with endocrinology (already established)     Pedal edema        -   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 Pillai for routine 6 month or sooner if needed. Labs will be reviewed, and further recommendations will be made based on results. Colon cancer screening as ordered by OBGYN.       Family Medicine Physician Assistant     Future Appointments       Date Specialty Appt Notes    10/20/2023 Lab .             Tests to Keep You Healthy    Mammogram: Met on 7/27/2023  Colon Cancer Screening: DUE  Cervical Cancer Screening: Met on 9/30/2022       I spent a total of 35 minutes on the day of the visit.This includes face to face time and non-face to face time preparing to see the patient (eg, review of tests), obtaining and/or reviewing separately obtained history, documenting clinical information in the electronic or other health record, independently interpreting results and communicating results to the patient/family/caregiver, or  care coordinator.    We have addressed [4] Moderate: 1 or more chronic illnesses with exacerbation, progression, or side effects of treatment / 2 or more stable chronic illnesses / 1 undiagnosed new problem with uncertain prognosis / 1 acute illness with systemic symptoms / 1 acute complicated injury  The complexity of the data reviewed and analyzed for this visit was [4] Moderate (Reviewed prior external note, ordered unique testing and reviewed the results of each unique test / Independently interpreted a test / Discussed management or interpretation of a test with another provider)  The risk of complications and/or morbidity or mortality are [4] Moderate risk (I.e. prescription drug management / decision regarding minor surgery with identified pt or procedure risk factors / decision regarding elective major surgery without identified pt or procedure risk factors / diagnosis or treatment significantly limited by social determinants of health)   The level of Medical Decision Making for this visit is [4] Moderate

## 2023-10-20 ENCOUNTER — LAB VISIT (OUTPATIENT)
Dept: LAB | Facility: HOSPITAL | Age: 43
End: 2023-10-20
Payer: COMMERCIAL

## 2023-10-20 ENCOUNTER — TELEPHONE (OUTPATIENT)
Dept: BARIATRICS | Facility: CLINIC | Age: 43
End: 2023-10-20
Payer: COMMERCIAL

## 2023-10-20 ENCOUNTER — PATIENT MESSAGE (OUTPATIENT)
Dept: ADMINISTRATIVE | Facility: HOSPITAL | Age: 43
End: 2023-10-20
Payer: COMMERCIAL

## 2023-10-20 DIAGNOSIS — L85.3 DRY SKIN: ICD-10-CM

## 2023-10-20 LAB
CRP SERPL-MCNC: 13.2 MG/L (ref 0–8.2)
ERYTHROCYTE [SEDIMENTATION RATE] IN BLOOD BY WESTERGREN METHOD: 8 MM/HR (ref 0–20)

## 2023-10-20 PROCEDURE — 36415 COLL VENOUS BLD VENIPUNCTURE: CPT | Mod: PO

## 2023-10-20 PROCEDURE — 85651 RBC SED RATE NONAUTOMATED: CPT | Mod: PO

## 2023-10-20 PROCEDURE — 86431 RHEUMATOID FACTOR QUANT: CPT

## 2023-10-20 PROCEDURE — 86038 ANTINUCLEAR ANTIBODIES: CPT

## 2023-10-20 PROCEDURE — 86140 C-REACTIVE PROTEIN: CPT

## 2023-10-21 LAB
FINAL PATHOLOGIC DIAGNOSIS: NORMAL
Lab: NORMAL

## 2023-10-23 ENCOUNTER — PATIENT OUTREACH (OUTPATIENT)
Dept: ADMINISTRATIVE | Facility: HOSPITAL | Age: 43
End: 2023-10-23
Payer: COMMERCIAL

## 2023-10-23 ENCOUNTER — TELEPHONE (OUTPATIENT)
Dept: GASTROENTEROLOGY | Facility: CLINIC | Age: 43
End: 2023-10-23
Payer: COMMERCIAL

## 2023-10-23 LAB
ANA SER QL IF: NORMAL
RHEUMATOID FACT SERPL-ACNC: <13 IU/ML (ref 0–15)

## 2023-10-23 NOTE — PROGRESS NOTES

## 2023-10-23 NOTE — TELEPHONE ENCOUNTER
----- Message from Evelyn Salas sent at 10/23/2023  3:28 PM CDT -----  Regarding: Call back  Type:  Needs Medical Advice    Who Called: Pt    Would the patient rather a call back or a response via [facility]? Callback    Best Call Back Number: [phone]    Additional Information: Pt would like an appt for a Colonoscopy. Please advise -------------- Thank you

## 2023-10-24 ENCOUNTER — TELEPHONE (OUTPATIENT)
Dept: GASTROENTEROLOGY | Facility: CLINIC | Age: 43
End: 2023-10-24
Payer: COMMERCIAL

## 2023-10-24 NOTE — TELEPHONE ENCOUNTER
Colonoscopy 12/27 at 230p arrive for 130p  instructions reviewed and patient states understanding. Copy to portal.  No GLP-1 medications

## 2023-10-27 LAB
ALBUMIN SERPL-MCNC: 4 G/DL (ref 3.6–5.1)
SHBG SERPL-SCNC: 34 NMOL/L (ref 17–124)
TESTOST FREE SERPL-MCNC: 1.2 PG/ML (ref 0.2–5)
TESTOST SERPL-MCNC: 10 NG/DL (ref 2–45)
TESTOSTERONE.FREE+WB SERPL-MCNC: 2.1 NG/DL (ref 0.5–8.5)

## 2023-11-27 ENCOUNTER — HOSPITAL ENCOUNTER (OUTPATIENT)
Facility: HOSPITAL | Age: 43
Discharge: HOME | End: 2023-11-30
Attending: STUDENT IN AN ORGANIZED HEALTH CARE EDUCATION/TRAINING PROGRAM | Admitting: HOSPITALIST
Payer: COMMERCIAL

## 2023-11-27 DIAGNOSIS — R20.0 LEFT FACIAL NUMBNESS: ICD-10-CM

## 2023-11-27 DIAGNOSIS — G45.9 TIA (TRANSIENT ISCHEMIC ATTACK): Primary | ICD-10-CM

## 2023-11-27 DIAGNOSIS — R29.818 ACUTE FOCAL NEUROLOGICAL DEFICIT: ICD-10-CM

## 2023-11-27 DIAGNOSIS — R29.898 LEFT ARM WEAKNESS: ICD-10-CM

## 2023-11-27 DIAGNOSIS — R20.0 LEFT ARM NUMBNESS: ICD-10-CM

## 2023-11-27 LAB
ALBUMIN SERPL BCP-MCNC: 4.1 G/DL (ref 3.5–5.2)
ALP SERPL-CCNC: 71 U/L (ref 55–135)
ALT SERPL W/O P-5'-P-CCNC: 30 U/L (ref 10–44)
ANION GAP SERPL CALC-SCNC: 12 MMOL/L (ref 8–16)
AST SERPL-CCNC: 27 U/L (ref 10–40)
B-HCG UR QL: NEGATIVE
BASOPHILS # BLD AUTO: 0.05 K/UL (ref 0–0.2)
BASOPHILS NFR BLD: 0.5 % (ref 0–1.9)
BILIRUB SERPL-MCNC: 0.5 MG/DL (ref 0.1–1)
BUN SERPL-MCNC: 18 MG/DL (ref 6–20)
CALCIUM SERPL-MCNC: 9.8 MG/DL (ref 8.7–10.5)
CHLORIDE SERPL-SCNC: 102 MMOL/L (ref 95–110)
CHOLEST SERPL-MCNC: 195 MG/DL (ref 120–199)
CHOLEST/HDLC SERPL: 4 {RATIO} (ref 2–5)
CO2 SERPL-SCNC: 26 MMOL/L (ref 23–29)
CREAT SERPL-MCNC: 0.9 MG/DL (ref 0.5–1.4)
CTP QC/QA: YES
DIFFERENTIAL METHOD: NORMAL
EOSINOPHIL # BLD AUTO: 0.1 K/UL (ref 0–0.5)
EOSINOPHIL NFR BLD: 1.1 % (ref 0–8)
ERYTHROCYTE [DISTWIDTH] IN BLOOD BY AUTOMATED COUNT: 12.3 % (ref 11.5–14.5)
EST. GFR  (NO RACE VARIABLE): >60 ML/MIN/1.73 M^2
GLUCOSE SERPL-MCNC: 87 MG/DL (ref 70–110)
HCT VFR BLD AUTO: 42.2 % (ref 37–48.5)
HDLC SERPL-MCNC: 49 MG/DL (ref 40–75)
HDLC SERPL: 25.1 % (ref 20–50)
HGB BLD-MCNC: 13.9 G/DL (ref 12–16)
IMM GRANULOCYTES # BLD AUTO: 0.02 K/UL (ref 0–0.04)
IMM GRANULOCYTES NFR BLD AUTO: 0.2 % (ref 0–0.5)
INR PPP: 0.9 (ref 0.8–1.2)
LDLC SERPL CALC-MCNC: 81.6 MG/DL (ref 63–159)
LYMPHOCYTES # BLD AUTO: 4.3 K/UL (ref 1–4.8)
LYMPHOCYTES NFR BLD: 41.2 % (ref 18–48)
MCH RBC QN AUTO: 28.9 PG (ref 27–31)
MCHC RBC AUTO-ENTMCNC: 32.9 G/DL (ref 32–36)
MCV RBC AUTO: 88 FL (ref 82–98)
MONOCYTES # BLD AUTO: 0.5 K/UL (ref 0.3–1)
MONOCYTES NFR BLD: 4.9 % (ref 4–15)
NEUTROPHILS # BLD AUTO: 5.5 K/UL (ref 1.8–7.7)
NEUTROPHILS NFR BLD: 52.1 % (ref 38–73)
NONHDLC SERPL-MCNC: 146 MG/DL
NRBC BLD-RTO: 0 /100 WBC
PLATELET # BLD AUTO: 214 K/UL (ref 150–450)
PMV BLD AUTO: 10.2 FL (ref 9.2–12.9)
POC PTINR: 1 (ref 0.9–1.2)
POCT GLUCOSE: 66 MG/DL (ref 70–110)
POTASSIUM SERPL-SCNC: 4.1 MMOL/L (ref 3.5–5.1)
PROT SERPL-MCNC: 7.5 G/DL (ref 6–8.4)
PROTHROMBIN TIME: 10.3 SEC (ref 9–12.5)
RBC # BLD AUTO: 4.81 M/UL (ref 4–5.4)
SAMPLE: NORMAL
SODIUM SERPL-SCNC: 140 MMOL/L (ref 136–145)
TRIGL SERPL-MCNC: 322 MG/DL (ref 30–150)
TSH SERPL DL<=0.005 MIU/L-ACNC: 1.5 UIU/ML (ref 0.4–4)
WBC # BLD AUTO: 10.54 K/UL (ref 3.9–12.7)

## 2023-11-27 PROCEDURE — 85610 PROTHROMBIN TIME: CPT

## 2023-11-27 PROCEDURE — 93010 ELECTROCARDIOGRAM REPORT: CPT | Mod: ,,, | Performed by: GENERAL PRACTICE

## 2023-11-27 PROCEDURE — 82962 GLUCOSE BLOOD TEST: CPT

## 2023-11-27 PROCEDURE — G0378 HOSPITAL OBSERVATION PER HR: HCPCS

## 2023-11-27 PROCEDURE — 84443 ASSAY THYROID STIM HORMONE: CPT | Performed by: STUDENT IN AN ORGANIZED HEALTH CARE EDUCATION/TRAINING PROGRAM

## 2023-11-27 PROCEDURE — 99285 EMERGENCY DEPT VISIT HI MDM: CPT | Mod: 25

## 2023-11-27 PROCEDURE — 82803 BLOOD GASES ANY COMBINATION: CPT

## 2023-11-27 PROCEDURE — 36415 COLL VENOUS BLD VENIPUNCTURE: CPT | Performed by: STUDENT IN AN ORGANIZED HEALTH CARE EDUCATION/TRAINING PROGRAM

## 2023-11-27 PROCEDURE — 99900035 HC TECH TIME PER 15 MIN (STAT)

## 2023-11-27 PROCEDURE — 93005 ELECTROCARDIOGRAM TRACING: CPT

## 2023-11-27 PROCEDURE — 25500020 PHARM REV CODE 255

## 2023-11-27 PROCEDURE — 80053 COMPREHEN METABOLIC PANEL: CPT | Performed by: STUDENT IN AN ORGANIZED HEALTH CARE EDUCATION/TRAINING PROGRAM

## 2023-11-27 PROCEDURE — 83036 HEMOGLOBIN GLYCOSYLATED A1C: CPT | Performed by: NURSE PRACTITIONER

## 2023-11-27 PROCEDURE — 85610 PROTHROMBIN TIME: CPT | Performed by: STUDENT IN AN ORGANIZED HEALTH CARE EDUCATION/TRAINING PROGRAM

## 2023-11-27 PROCEDURE — 36415 COLL VENOUS BLD VENIPUNCTURE: CPT | Performed by: NURSE PRACTITIONER

## 2023-11-27 PROCEDURE — 80061 LIPID PANEL: CPT | Performed by: STUDENT IN AN ORGANIZED HEALTH CARE EDUCATION/TRAINING PROGRAM

## 2023-11-27 PROCEDURE — 93010 EKG 12-LEAD: ICD-10-PCS | Mod: ,,, | Performed by: GENERAL PRACTICE

## 2023-11-27 PROCEDURE — 85025 COMPLETE CBC W/AUTO DIFF WBC: CPT | Performed by: STUDENT IN AN ORGANIZED HEALTH CARE EDUCATION/TRAINING PROGRAM

## 2023-11-27 PROCEDURE — 81025 URINE PREGNANCY TEST: CPT | Performed by: STUDENT IN AN ORGANIZED HEALTH CARE EDUCATION/TRAINING PROGRAM

## 2023-11-27 RX ORDER — AMMONIUM LACTATE 12 G/100G
1 LOTION TOPICAL 2 TIMES DAILY PRN
COMMUNITY
Start: 2023-11-20

## 2023-11-27 RX ORDER — ASPIRIN 81 MG/1
81 TABLET ORAL DAILY
Status: DISCONTINUED | OUTPATIENT
Start: 2023-11-28 | End: 2023-11-30 | Stop reason: HOSPADM

## 2023-11-27 RX ORDER — CALCIUM CARB, CITRATE, MALATE 250 MG
1 CAPSULE ORAL DAILY
COMMUNITY

## 2023-11-27 RX ORDER — FAMOTIDINE 20 MG/1
20 TABLET, FILM COATED ORAL 2 TIMES DAILY
Status: DISCONTINUED | OUTPATIENT
Start: 2023-11-27 | End: 2023-11-30 | Stop reason: HOSPADM

## 2023-11-27 RX ORDER — ATORVASTATIN CALCIUM 40 MG/1
40 TABLET, FILM COATED ORAL DAILY
Status: DISCONTINUED | OUTPATIENT
Start: 2023-11-28 | End: 2023-11-30 | Stop reason: HOSPADM

## 2023-11-27 RX ORDER — ONDANSETRON 2 MG/ML
4 INJECTION INTRAMUSCULAR; INTRAVENOUS EVERY 8 HOURS PRN
Status: DISCONTINUED | OUTPATIENT
Start: 2023-11-27 | End: 2023-11-30 | Stop reason: HOSPADM

## 2023-11-27 RX ORDER — CLOBETASOL PROPIONATE 0.46 MG/ML
1 SOLUTION TOPICAL 2 TIMES DAILY
COMMUNITY
Start: 2023-11-20

## 2023-11-27 RX ORDER — SODIUM CHLORIDE 0.9 % (FLUSH) 0.9 %
10 SYRINGE (ML) INJECTION
Status: DISCONTINUED | OUTPATIENT
Start: 2023-11-27 | End: 2023-11-30 | Stop reason: HOSPADM

## 2023-11-27 RX ADMIN — IOHEXOL 75 ML: 350 INJECTION, SOLUTION INTRAVENOUS at 04:11

## 2023-11-27 NOTE — ED PROVIDER NOTES
Encounter Date: 2023       History     Chief Complaint   Patient presents with    left arm numbness      Started 1 pm  / arm feels heavy      43-year-old female presents with left arm numbness and weakness and left face numbness.  Onset at 1:00 p.m..  Weakness lasted less than 1 hour, sensation deficits lasted for nearly 3 hours.  No known history of hypertension, hyperlipidemia, diabetes.  No trauma, fever or chills    The history is provided by the patient and a parent.     Review of patient's allergies indicates:   Allergen Reactions    Sertraline      Other reaction(s): heartburn     Past Medical History:   Diagnosis Date    Anxiety state, unspecified     Disturbance of salivary secretion     Edema     GERD (gastroesophageal reflux disease)     Goiter, unspecified     Nonspecific elevation of levels of transaminase or lactic acid dehydrogenase (LDH)     Polyphagia(783.6)     Unspecified sleep apnea     Varices of other sites      Past Surgical History:   Procedure Laterality Date     SECTION      x 2    ENDOMETRIAL ABLATION      OOPHORECTOMY Left     Dermoid cyst    TUBAL LIGATION       Family History   Problem Relation Age of Onset    Cancer Mother         thyroid    Hypertension Mother     Diabetes Father     Hypertension Father      Social History     Tobacco Use    Smoking status: Never    Smokeless tobacco: Never   Substance Use Topics    Alcohol use: Yes     Comment: occasionally    Drug use: No     Review of Systems   All other systems reviewed and are negative.      Physical Exam     Initial Vitals [23 1558]   BP Pulse Resp Temp SpO2   129/86 79 16 97.2 °F (36.2 °C) 99 %      MAP       --         Physical Exam    Nursing note and vitals reviewed.  Constitutional: She appears well-developed and well-nourished.   HENT:   Head: Normocephalic and atraumatic.   Eyes: Right eye exhibits no discharge. Left eye exhibits no discharge.   Cardiovascular:  Normal rate and regular rhythm.            Pulmonary/Chest: Effort normal. No stridor. No respiratory distress.   Abdominal: Abdomen is soft. There is no abdominal tenderness.   Musculoskeletal:         General: No tenderness.     Neurological: She is alert.   NIH scale of 1 for left-sided sensory deficits in face and  left upper extremity   Skin: Skin is warm. No rash noted. No erythema.         ED Course   Procedures  Labs Reviewed   LIPID PANEL - Abnormal; Notable for the following components:       Result Value    Triglycerides 322 (*)     All other components within normal limits   POCT GLUCOSE - Abnormal; Notable for the following components:    POCT Glucose 66 (*)     All other components within normal limits   CBC W/ AUTO DIFFERENTIAL   COMPREHENSIVE METABOLIC PANEL   PROTIME-INR   TSH   POCT URINE PREGNANCY   POCT GLUCOSE, HAND-HELD DEVICE   ISTAT PROCEDURE   POCT PROTIME-INR          Imaging Results              CTA Head and Neck (xpd) (Final result)  Result time 11/27/23 16:38:40      Final result by Anders Webb MD (11/27/23 16:38:40)                   Impression:      1. Negative pre and postcontrast CT imaging of the head.  No acute process.  2. Negative CTA of the head and neck.  The critical information above was relayed directly by Anders Webb secure chat to Jeff Santizo Jr. on 11/27/2023 at 16:36.      Electronically signed by: Anders Webb  Date:    11/27/2023  Time:    16:38               Narrative:    EXAMINATION:  CTA HEAD AND NECK (XPD)    CLINICAL HISTORY:  Neuro deficit, acute, stroke suspected;    TECHNIQUE:  Non contrast low dose axial images were obtained through the head. CT angiogram was performed from the level of the joey to the top of the head following the IV administration of 75ML of Omnipaque 350.   Sagittal and coronal reconstructions and maximum intensity projection reconstructions were performed. Arterial stenosis percentages are based on NASCET measurement criteria.    COMPARISON:  Head CT  12/09/2022.    FINDINGS:  CTA HEAD:    Vasculature: The intracranial arteries show normal anatomy without evidence of major branch occlusion or focal luminal narrowing.There is no suspicion of vascular malformation or saccular aneurysm.    Variant anatomy: Developmentally diminutive distal right vertebral artery and left A1 CLEMENTINA.    Brain: There is no evidence of mass, mass effect, edema, midline shift, intracranial hemorrhage, or space-occupying extra-axial fluid collection. After the administration of contrast there is no abnormal enhancement.    Ventricles/Sulci: The ventricles and sulci are appropriate in size for age.    Osseous Structures: The osseous structures are unremarkable in appearance.    Sinuses and orbits: The visualized portions of the paranasal sinuses and orbits are unremarkable.    Other: Visualized portions of the mastoids are unremarkable.    CTA NECK:    Aorta: Conventional branching pattern. The great vessel origins are patent without flow limiting stenosis.    Vertebral Arteries: The origins of the vertebral arteries are patent.  No flow limiting stenosis, occlusion, or dissection.    Right Carotid: No flow limiting stenosis, occlusion, or dissection of the common carotid or internal carotid arteries.  No significant plaque of the proximal ICA.  Right internal carotid artery: 0 % stenosis by and NASCET.    Left Carotid: No flow limiting stenosis, occlusion, or dissection of the common carotid or internal carotid arteries. No significant plaque of the proximal ICA. Right internal carotid artery: 0 % stenosis by and NASCET.    Extravascular Anatomy: No definite adjacent soft tissue abnormality seen accounting for technique used.                                       Medications   iohexoL (OMNIPAQUE 350) 350 mg iodine/mL injection (75 mLs Intravenous Given 11/27/23 1626)     Medical Decision Making  43-year-old female presents with left-sided neurological deficits.  Both motor and sensory  involved, sensory deficits lasted longer than motor.  Stroke protocol initiated spoke with tele neurologist.  No indication for tPA due to non disabling symptoms.  Low NIH scale which is 1.  ABCD2 score per neurologist to, per my information ABCD2 score of 4. BP, weakness, duration of symptoms.  MRI brain ordered.  Spoke with hospitalist team [facility] nurse practitioner will admit for further management evaluation.  Patient and mother updated and agrees with plan.  Prior to admission symptoms fully resolved.    Amount and/or Complexity of Data Reviewed  Labs: ordered. Decision-making details documented in ED Course.  Radiology: ordered.  ECG/medicine tests: ordered and independent interpretation performed.     Details: Rate 76, normal sinus rhythm, QRS 88 , no STEMI    Risk  Decision regarding hospitalization.    Critical Care  Total time providing critical care: 35 minutes      Additional MDM:     NIH Stroke Scale:   Level of consciousness = 0 - alert  LOC questions = 0 - answers both correctly  LOC commands = 0 - performs both correctly  Best gaze = 0 - normal  Visual = 0 - no visual loss  Facial palsy = 0 - normal  Motor left arm =  0 - no drift  Motor right arm =  0 - no drift  Motor left leg = 0 - no drift  Motor right leg =  0 - no drift  Limb ataxia = 0 - absent  Sensory = 1 - mild to moderate loss  Best language = 0 - no aphasia  Dysarthria = 0 - normal articulation  Extinction and inattention = 0 - no neglect  NIH Stroke Scale Total = 1              ED Course as of 11/27/23 1828 Mon Nov 27, 2023   1635 Spoke with neurologist Dr. Sexton recommend MRI brain.  She states if MRI positive for stroke recommended admission.  If MRI negative and symptoms resolved treat as possible TIA with low ABCD2 score and good for outpatient neurology follow up in antiplatelet therapy with aspirin and Plavix.  Spoke with MRI technologist Brea notified her placed order for MRI brain stat rule out acute stroke.   She states imaging will be be performed sometime this evening. [KB]   1637     Thrombolysis Candidate? No, Non-disabling symptoms - Low NIHSS     Delays to Thrombolysis?  No       [KB]   1652 Patient re-evaluated, symptoms fully resolved at this time. [KB]   1704 Preg Test, Ur: Negative [KB]   1704 POC PTINR: 1.0 [KB]   1704 Triglycerides(!): 322 [KB]   1704 POCT Glucose(!): 66 [KB]   1704 Sodium: 140 [KB]   1704 Glucose: 87 [KB]   1704 BUN: 18 [KB]   1704 Creatinine: 0.9 [KB]   1704 INR: 0.9 [KB]   1704 WBC: 10.54 [KB]   1704 Hemoglobin: 13.9 [KB]   1704 Hematocrit: 42.2 [KB]      ED Course User Index  [KB] Jeff Santizo Jr., DO                           Clinical Impression:  Final diagnoses:  [R29.818] Acute focal neurological deficit (Primary)  [R29.898] Left arm weakness  [R20.0] Left arm numbness  [R20.0] Left facial numbness          ED Disposition Condition    Observation Stable                Jeff Santizo Jr., DO  11/27/23 1757       Jeff Santizo Jr., DO  11/27/23 1758       Jeff Santizo Jr., DO  11/27/23 1826

## 2023-11-27 NOTE — SUBJECTIVE & OBJECTIVE
HPI:  43 y.o. female with a PMHx significant for GERD, HLD presenting with left arm numbness.      Starting at 1300 today, her left arm felt numb/tingling. Also felt heavy. Sensory deficits are present from her shoulder to her fingertips.     No history of stroke or TIA. ROS + nausea. No headaches. No sensory symptoms elsewhere. No chest pain or SOB. No neck/shoulder/arm pain. No recent trauma. No vision changes.     No AP/AC medications.     Images personally reviewed and interpreted:  Study: Head CT and CTA Head & Neck  Study Interpretation: No acute intracranial hemorrhage. No      Assessment and plan:  # Left arm numbness/heaviness  - Unclear etiology at this time, but there is concern for possible TIA/small ischemic stroke. Plan to defer IV lytics at this time given mild, non-disabling symptoms. Her exam is VAN-.    Lytics recommendation: Thrombolytic therapy not recommended due to Mild Non-Disabling Symptoms  Thrombectomy recommendation: No; No large vessel occlusion identified on imaging   Placement recommendation: pending further studies     - Recommend follow-up non-urgent MRI brain without contrast to evaluate for acute ischemia.  -- If + for acute infarction, recommend admission for stroke work-up.   -- If negative for acute infarction and the patient's symptoms have resolved, can consider TIA (ABCD2 score 2). Recommend DAPT (Load clopidogrel 300mg and aspirin 325mg x 1. Then start dual-antiplatelet therapy with ASA 81mg and clopidogrel 75mg daily for 21 days. Then continue ASA 81mg indefinitely.), TTE, lipid panel, A1c (can be performed in the outpatient setting).       Please contact us with any further questions or concerns or if patient has any acute neurological changes (new symptoms, worsening deficits).

## 2023-11-27 NOTE — TELEMEDICINE CONSULT
[facility]  Vascular Neurology  Comprehensive Stroke Center  TeleVascular Neurology Acute Consultation Note        Consult Information  Consult to Telemedicine - Acute Stroke  Consult performed by: Barb Sexton MD  Consult ordered by: Cristopher Santizo Jr., DO          Consulting Provider: CRISTOPHER SANTIZO JR.   Current Providers  No providers found    Patient Location:  [facility] EMERGENCY DEPARTMENT Emergency Department    Spoke hospital nurse at bedside with patient assisting consultant.  Patient information was obtained from patient.       Stroke Documentation  Acute Stroke Times   Last Known Normal Date: 11/27/23  Last Known Normal Time: 1300  Symptom Onset Date: 11/27/23  Symptom Onset Time: 1300  Stroke Team Called Date: 11/27/23  Stroke Team Called Time: 1608  Stroke Team Arrival Date: 11/27/23  Stroke Team Arrival Time: 1609 (Pt in CT until 1630)  CT Interpretation Time: 0430  Thrombolytic Therapy Recommended: No    NIH Scale:  Interval: baseline  1a. Level of Consciousness: 0-->Alert, keenly responsive  1b. LOC Questions: 0-->Answers both questions correctly  1c. LOC Commands: 0-->Performs both tasks correctly  2. Best Gaze: 0-->Normal  3. Visual: 0-->No visual loss  4. Facial Palsy: 0-->Normal symmetrical movements  5a. Motor Arm, Left: 0-->No drift, limb holds 90 (or 45) degrees for full 10 secs  5b. Motor Arm, Right: 0-->No drift, limb holds 90 (or 45) degrees for full 10 secs  6a. Motor Leg, Left: 0-->No drift, leg holds 30 degree position for full 5 secs  6b. Motor Leg, Right: 0-->No drift, leg holds 30 degree position for full 5 secs  7. Limb Ataxia: 0-->Absent  8. Sensory: 1-->Mild-to-moderate sensory loss, patient feels pinprick is less sharp or is dull on the affected side, or there is a loss of superficial pain with pinprick, but patient is aware of being touched  9. Best Language: 0-->No aphasia, normal  10. Dysarthria: 0-->Normal  11. Extinction and Inattention (formerly  "Neglect): 0-->No abnormality  Total (NIH Stroke Scale): 1      Modified [place]: Score: 0  Hannah Coma Scale:     ABCD2 Score:    [id number] Score:    HAS -BLED Score:    ICH Score:    Hunt & Sauceda Classification:      Blood pressure (!) 149/82, pulse 89, temperature 97.2 °F (36.2 °C), temperature source Oral, resp. rate 16, height 5' 6" (1.676 m), weight 112 kg (247 lb), last menstrual period 11/06/2023, SpO2 97 %, not currently breastfeeding.    Diagnoses  No problems updated.    Medical Decision Making  HPI:  43 y.o. female with a PMHx significant for GERD, HLD presenting with left arm numbness.      Starting at 1300 today, her left arm felt numb/tingling. Also felt heavy. Sensory deficits are present from her shoulder to her fingertips.     No history of stroke or TIA. ROS + nausea. No headaches. No sensory symptoms elsewhere. No chest pain or SOB. No neck/shoulder/arm pain. No recent trauma. No vision changes.     No AP/AC medications.     Images personally reviewed and interpreted:  Study: Head CT and CTA Head & Neck  Study Interpretation: No acute intracranial hemorrhage. No      Assessment and plan:  # Left arm numbness/heaviness  - Unclear etiology at this time, but there is concern for possible TIA/small ischemic stroke. Plan to defer IV lytics at this time given mild, non-disabling symptoms. Her exam is VAN-.    Lytics recommendation: Thrombolytic therapy not recommended due to Mild Non-Disabling Symptoms  Thrombectomy recommendation: No; No large vessel occlusion identified on imaging   Placement recommendation: pending further studies     - Recommend follow-up non-urgent MRI brain without contrast to evaluate for acute ischemia.  -- If + for acute infarction, recommend admission for stroke work-up.   -- If negative for acute infarction and the patient's symptoms have resolved, can consider TIA (ABCD2 score 2). Recommend DAPT (Load clopidogrel 300mg and aspirin 325mg x 1. Then start dual-antiplatelet " therapy with ASA 81mg and clopidogrel 75mg daily for 21 days. Then continue ASA 81mg indefinitely.), TTE, lipid panel, A1c (can be performed in the outpatient setting).       Please contact us with any further questions or concerns or if patient has any acute neurological changes (new symptoms, worsening deficits).              ROS  Physical Exam  Past Medical History:   Diagnosis Date    Anxiety state, unspecified     Disturbance of salivary secretion     Edema     GERD (gastroesophageal reflux disease)     Goiter, unspecified     Nonspecific elevation of levels of transaminase or lactic acid dehydrogenase (LDH)     Polyphagia(783.6)     Unspecified sleep apnea     Varices of other sites      Past Surgical History:   Procedure Laterality Date     SECTION      x 2    ENDOMETRIAL ABLATION      OOPHORECTOMY Left     Dermoid cyst    TUBAL LIGATION       Family History   Problem Relation Age of Onset    Cancer Mother         thyroid    Hypertension Mother     Diabetes Father     Hypertension Father            Barb Sexton MD, PhD      Emergent/Acute neurological consultation requested by spoke provider due to critical concerns for possible cerebrovascular event that could result in permanent loss of neurologic/bodily function, severe disability or death of this patient.  Immediate/timely evaluation by a highly prepared expert is paramount for optimal outcomes  High risk for neurological deterioration if not properly diagnosed  High risk for neurological deterioration if not treated promplty/as soon as possible  Complex diagnostic evaluation may be required (advanced imaging)  High risk treatment options (thrombolytics and/or thrombectomy)    Patient care was coordinated with spoke provider, including but not limted to    Discussing likely diagnosis/etiology of symptoms  Making recommendations for further diagnostic studies  Making recommendations for intravenous thrombolytics or other advanced therapies  Making  recommendations for disposition (admission/transfer for higher level of care)

## 2023-11-27 NOTE — ED NOTES
Uma Bangura presents to the ED with c/o left arm numbness / heaviness that started at 1300. Patient is able to move all of her extremities. She is AAOx4. Mucous membranes are pink and moist. Skin is warm, dry and intact. URMILA VSS  Verified patient's name and date of birth.

## 2023-11-28 LAB
ALBUMIN SERPL BCP-MCNC: 3.6 G/DL (ref 3.5–5.2)
ALP SERPL-CCNC: 63 U/L (ref 55–135)
ALT SERPL W/O P-5'-P-CCNC: 29 U/L (ref 10–44)
ANION GAP SERPL CALC-SCNC: 11 MMOL/L (ref 8–16)
AORTIC ROOT ANNULUS: 2.54 CM
AORTIC VALVE CUSP SEPERATION: 1.96 CM
APTT PPP: 26.7 SEC (ref 21–32)
ASCENDING AORTA: 2.79 CM
AST SERPL-CCNC: 25 U/L (ref 10–40)
AV INDEX (PROSTH): 0.88
AV MEAN GRADIENT: 4 MMHG
AV PEAK GRADIENT: 6 MMHG
AV VALVE AREA BY VELOCITY RATIO: 2.85 CM²
AV VALVE AREA: 2.77 CM²
AV VELOCITY RATIO: 0.91
BASOPHILS # BLD AUTO: 0.04 K/UL (ref 0–0.2)
BASOPHILS NFR BLD: 0.5 % (ref 0–1.9)
BILIRUB SERPL-MCNC: 0.5 MG/DL (ref 0.1–1)
BSA FOR ECHO PROCEDURE: 2.27 M2
BUN SERPL-MCNC: 16 MG/DL (ref 6–20)
CALCIUM SERPL-MCNC: 9 MG/DL (ref 8.7–10.5)
CHLORIDE SERPL-SCNC: 104 MMOL/L (ref 95–110)
CO2 SERPL-SCNC: 25 MMOL/L (ref 23–29)
CREAT SERPL-MCNC: 1 MG/DL (ref 0.5–1.4)
CV ECHO LV RWT: 0.48 CM
DIFFERENTIAL METHOD: NORMAL
DOP CALC AO PEAK VEL: 1.18 M/S
DOP CALC AO VTI: 22 CM
DOP CALC LVOT AREA: 3.1 CM2
DOP CALC LVOT DIAMETER: 2 CM
DOP CALC LVOT PEAK VEL: 1.07 M/S
DOP CALC LVOT STROKE VOLUME: 60.92 CM3
DOP CALC MV VTI: 16.5 CM
DOP CALCLVOT PEAK VEL VTI: 19.4 CM
E WAVE DECELERATION TIME: 247.08 MSEC
E/A RATIO: 0.73
E/E' RATIO: 5.65 M/S
ECHO LV POSTERIOR WALL: 0.97 CM (ref 0.6–1.1)
EOSINOPHIL # BLD AUTO: 0.1 K/UL (ref 0–0.5)
EOSINOPHIL NFR BLD: 1.7 % (ref 0–8)
ERYTHROCYTE [DISTWIDTH] IN BLOOD BY AUTOMATED COUNT: 12.4 % (ref 11.5–14.5)
EST. GFR  (NO RACE VARIABLE): >60 ML/MIN/1.73 M^2
ESTIMATED AVG GLUCOSE: 117 MG/DL (ref 68–131)
FRACTIONAL SHORTENING: 31 % (ref 28–44)
GLUCOSE SERPL-MCNC: 98 MG/DL (ref 70–110)
HBA1C MFR BLD: 5.7 % (ref 4.5–6.2)
HCT VFR BLD AUTO: 40.4 % (ref 37–48.5)
HGB BLD-MCNC: 13.4 G/DL (ref 12–16)
IMM GRANULOCYTES # BLD AUTO: 0.01 K/UL (ref 0–0.04)
IMM GRANULOCYTES NFR BLD AUTO: 0.1 % (ref 0–0.5)
INR PPP: 0.9 (ref 0.8–1.2)
INTERVENTRICULAR SEPTUM: 0.96 CM (ref 0.6–1.1)
IVRT: 125.59 MSEC
LA MAJOR: 4.5 CM
LEFT ATRIUM VOLUME INDEX MOD: 16.6 ML/M2
LEFT ATRIUM VOLUME MOD: 36.06 CM3
LEFT INTERNAL DIMENSION IN SYSTOLE: 2.79 CM (ref 2.1–4)
LEFT VENTRICLE DIASTOLIC VOLUME INDEX: 33.36 ML/M2
LEFT VENTRICLE DIASTOLIC VOLUME: 72.4 ML
LEFT VENTRICLE MASS INDEX: 57 G/M2
LEFT VENTRICLE SYSTOLIC VOLUME INDEX: 13.5 ML/M2
LEFT VENTRICLE SYSTOLIC VOLUME: 29.23 ML
LEFT VENTRICULAR INTERNAL DIMENSION IN DIASTOLE: 4.06 CM (ref 3.5–6)
LEFT VENTRICULAR MASS: 123.73 G
LV LATERAL E/E' RATIO: 4.8 M/S
LV SEPTAL E/E' RATIO: 6.86 M/S
LVOT MG: 2.8 MMHG
LVOT MV: 0.79 CM/S
LYMPHOCYTES # BLD AUTO: 3.7 K/UL (ref 1–4.8)
LYMPHOCYTES NFR BLD: 45.5 % (ref 18–48)
MAGNESIUM SERPL-MCNC: 2 MG/DL (ref 1.6–2.6)
MCH RBC QN AUTO: 29 PG (ref 27–31)
MCHC RBC AUTO-ENTMCNC: 33.2 G/DL (ref 32–36)
MCV RBC AUTO: 87 FL (ref 82–98)
MONOCYTES # BLD AUTO: 0.5 K/UL (ref 0.3–1)
MONOCYTES NFR BLD: 6.3 % (ref 4–15)
MV MEAN GRADIENT: 1 MMHG
MV PEAK A VEL: 0.66 M/S
MV PEAK E VEL: 0.48 M/S
MV PEAK GRADIENT: 3 MMHG
MV STENOSIS PRESSURE HALF TIME: 66.3 MS
MV VALVE AREA BY CONTINUITY EQUATION: 3.69 CM2
MV VALVE AREA P 1/2 METHOD: 3.32 CM2
NEUTROPHILS # BLD AUTO: 3.7 K/UL (ref 1.8–7.7)
NEUTROPHILS NFR BLD: 45.9 % (ref 38–73)
NRBC BLD-RTO: 0 /100 WBC
OHS LV EJECTION FRACTION SIMPSONS BIPLANE MOD: 71 %
PHOSPHATE SERPL-MCNC: 3.6 MG/DL (ref 2.7–4.5)
PISA TR MAX VEL: 2.47 M/S
PLATELET # BLD AUTO: 188 K/UL (ref 150–450)
PMV BLD AUTO: 10.5 FL (ref 9.2–12.9)
POTASSIUM SERPL-SCNC: 4 MMOL/L (ref 3.5–5.1)
PROT SERPL-MCNC: 6.5 G/DL (ref 6–8.4)
PROTHROMBIN TIME: 10.4 SEC (ref 9–12.5)
PV MV: 0.61 M/S
PV PEAK GRADIENT: 3 MMHG
PV PEAK VELOCITY: 0.86 M/S
RA MAJOR: 4.45 CM
RA PRESSURE ESTIMATED: 3 MMHG
RBC # BLD AUTO: 4.62 M/UL (ref 4–5.4)
RIGHT VENTRICULAR END-DIASTOLIC DIMENSION: 3.1 CM
RIGHT VENTRICULAR LENGTH IN DIASTOLE (APICAL 4-CHAMBER VIEW): 5.35 CM
RV TB RVSP: 5 MMHG
RV TISSUE DOPPLER FREE WALL SYSTOLIC VELOCITY 1 (APICAL 4 CHAMBER VIEW): 13.4 CM/S
SODIUM SERPL-SCNC: 140 MMOL/L (ref 136–145)
STJ: 3 CM
TDI LATERAL: 0.1 M/S
TDI SEPTAL: 0.07 M/S
TDI: 0.09 M/S
TR MAX PG: 24 MMHG
TRICUSPID ANNULAR PLANE SYSTOLIC EXCURSION: 2.7 CM
TV REST PULMONARY ARTERY PRESSURE: 27 MMHG
WBC # BLD AUTO: 8.06 K/UL (ref 3.9–12.7)
Z-SCORE OF LEFT VENTRICULAR DIMENSION IN END DIASTOLE: -5.7
Z-SCORE OF LEFT VENTRICULAR DIMENSION IN END SYSTOLE: -3.55

## 2023-11-28 PROCEDURE — 94760 N-INVAS EAR/PLS OXIMETRY 1: CPT

## 2023-11-28 PROCEDURE — 97161 PT EVAL LOW COMPLEX 20 MIN: CPT

## 2023-11-28 PROCEDURE — 92523 SPEECH SOUND LANG COMPREHEN: CPT

## 2023-11-28 PROCEDURE — 85730 THROMBOPLASTIN TIME PARTIAL: CPT | Performed by: NURSE PRACTITIONER

## 2023-11-28 PROCEDURE — 94761 N-INVAS EAR/PLS OXIMETRY MLT: CPT

## 2023-11-28 PROCEDURE — 36415 COLL VENOUS BLD VENIPUNCTURE: CPT | Performed by: NURSE PRACTITIONER

## 2023-11-28 PROCEDURE — 84100 ASSAY OF PHOSPHORUS: CPT | Performed by: NURSE PRACTITIONER

## 2023-11-28 PROCEDURE — 85610 PROTHROMBIN TIME: CPT | Performed by: NURSE PRACTITIONER

## 2023-11-28 PROCEDURE — 85025 COMPLETE CBC W/AUTO DIFF WBC: CPT | Performed by: NURSE PRACTITIONER

## 2023-11-28 PROCEDURE — 92610 EVALUATE SWALLOWING FUNCTION: CPT

## 2023-11-28 PROCEDURE — A9585 GADOBUTROL INJECTION: HCPCS | Performed by: HOSPITALIST

## 2023-11-28 PROCEDURE — 25000003 PHARM REV CODE 250: Performed by: NURSE PRACTITIONER

## 2023-11-28 PROCEDURE — 97165 OT EVAL LOW COMPLEX 30 MIN: CPT

## 2023-11-28 PROCEDURE — G0378 HOSPITAL OBSERVATION PER HR: HCPCS

## 2023-11-28 PROCEDURE — 80053 COMPREHEN METABOLIC PANEL: CPT | Performed by: NURSE PRACTITIONER

## 2023-11-28 PROCEDURE — 25500020 PHARM REV CODE 255: Performed by: HOSPITALIST

## 2023-11-28 PROCEDURE — 83735 ASSAY OF MAGNESIUM: CPT | Performed by: NURSE PRACTITIONER

## 2023-11-28 PROCEDURE — 25000003 PHARM REV CODE 250

## 2023-11-28 RX ORDER — CARBOXYMETHYLCELLULOSE SODIUM 10 MG/ML
2 GEL OPHTHALMIC EVERY 6 HOURS
Status: DISCONTINUED | OUTPATIENT
Start: 2023-11-28 | End: 2023-11-28

## 2023-11-28 RX ORDER — GADOBUTROL 604.72 MG/ML
10 INJECTION INTRAVENOUS
Status: COMPLETED | OUTPATIENT
Start: 2023-11-28 | End: 2023-11-28

## 2023-11-28 RX ORDER — ACETAMINOPHEN 325 MG/1
650 TABLET ORAL EVERY 6 HOURS PRN
Status: DISCONTINUED | OUTPATIENT
Start: 2023-11-28 | End: 2023-11-30 | Stop reason: HOSPADM

## 2023-11-28 RX ORDER — CARBOXYMETHYLCELLULOSE SODIUM 10 MG/ML
2 GEL OPHTHALMIC EVERY 6 HOURS PRN
Status: DISCONTINUED | OUTPATIENT
Start: 2023-11-28 | End: 2023-11-30 | Stop reason: HOSPADM

## 2023-11-28 RX ORDER — GADOBUTROL 604.72 MG/ML
INJECTION INTRAVENOUS
Status: DISPENSED
Start: 2023-11-28 | End: 2023-11-29

## 2023-11-28 RX ORDER — ATORVASTATIN CALCIUM 40 MG/1
40 TABLET, FILM COATED ORAL DAILY
Qty: 90 TABLET | Refills: 3 | Status: SHIPPED | OUTPATIENT
Start: 2023-11-29 | End: 2024-01-04 | Stop reason: ALTCHOICE

## 2023-11-28 RX ORDER — ASPIRIN 81 MG/1
81 TABLET ORAL DAILY
Qty: 90 TABLET | Refills: 3 | Status: SHIPPED | OUTPATIENT
Start: 2023-11-29 | End: 2024-11-28

## 2023-11-28 RX ADMIN — FAMOTIDINE 20 MG: 20 TABLET, FILM COATED ORAL at 09:11

## 2023-11-28 RX ADMIN — FAMOTIDINE 20 MG: 20 TABLET, FILM COATED ORAL at 08:11

## 2023-11-28 RX ADMIN — GADOBUTROL 10 ML: 604.72 INJECTION INTRAVENOUS at 08:11

## 2023-11-28 RX ADMIN — ASPIRIN 81 MG: 81 TABLET, COATED ORAL at 08:11

## 2023-11-28 RX ADMIN — ATORVASTATIN CALCIUM 40 MG: 40 TABLET, FILM COATED ORAL at 08:11

## 2023-11-28 RX ADMIN — ACETAMINOPHEN 650 MG: 500 TABLET ORAL at 10:11

## 2023-11-28 NOTE — PLAN OF CARE
PCP apt scheduled at bedside with pt using smart scheduling suggestion. Apt placed on AVS    Requested apt with neuro. Office to call patient for scheduling.      11/28/23 1113   Post-Acute Status   Hospital Resources/Appts/Education Provided Appointments scheduled and added to AVS

## 2023-11-28 NOTE — PT/OT/SLP EVAL
Physical Therapy Evaluation and Discharge Note    Patient Name:  Uma Bangura   MRN:  [id number]    Recommendations:     Discharge Recommendations: No Therapy Indicated  Discharge Equipment Recommendations: none   Barriers to discharge: None    Assessment:     Uma Bangura is a 43 y.o. female admitted with a medical diagnosis of TIA (transient ischemic attack). .pt alert/pleasant/ interactive . Pt moving all extremities with no deficits. Pt ambulated at hallways 250ft x2 independently with good speed. Pt stated of tingling both hands. L facial tingling resolved.  At this time, patient is functioning at their prior level of function and does not require further acute PT services.     Recent Surgery: * No surgery found *      Plan:     During this hospitalization, patient does not require further acute PT services.  Please re-consult if situation changes.      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  Equipment used at home: none.  DME owned (not currently used): none.  Upon discharge, patient will have assistance from family.    Objective:     Communicated with nurse Arias prior to session.  Patient found sitting edge of bed with telemetry upon PT entry to room.    General Precautions: Standard,      Orthopedic Precautions:N/A   Braces: N/A  Respiratory Status: Room air    Exams:  Postural Exam:  Patient presented with the following abnormalities:    -       Rounded shoulders  -       Forward head  -       BMI 39.35  RLE ROM: WFL  RLE Strength: WFL  LLE ROM: WFL  LLE Strength: WFL    Functional Mobility:  Bed Mobility:     Scooting: independence  Sit to Supine: independence  Transfers:     Sit to Stand:  independence  with no AD  Gait: 250ft x2 with good danya and speed    AM-PAC 6 CLICK MOBILITY  Total Score:24       Treatment and Education:  Patient was educated on the importance of OOB activity and functional mobility to negate negative effects of prolonged bed rest during hospitalization, safe transfers and ambulation, and D/C planning   Back to bed post PT  Family at bedside    AM-PAC 6 CLICK MOBILITY  Total Score:24     Patient left HOB elevated with all lines intact and call button in reach.    GOALS:   Multidisciplinary Problems       Physical Therapy Goals       Not on file                    History:     Past Medical History:   Diagnosis Date    Anxiety state, unspecified     Disturbance of salivary secretion     Edema     GERD (gastroesophageal reflux disease)     Goiter, unspecified     Nonspecific elevation of levels of transaminase or lactic acid dehydrogenase (LDH)     Polyphagia(783.6)     Unspecified sleep apnea     Varices of other sites        Past Surgical History:   Procedure Laterality Date     SECTION      x 2    ENDOMETRIAL ABLATION      OOPHORECTOMY Left     Dermoid cyst    TUBAL LIGATION         Time Tracking:     PT Received On: 23  PT Start Time: 0958     PT Stop Time: 1006  PT Total Time (min): 8 min     Billable Minutes: Evaluation 8      2023

## 2023-11-28 NOTE — PLAN OF CARE
Pt clear for DC from case management standpoint. Discharging to home      DC pending neuro clearance       11/28/23 1113   Final Note   Assessment Type Final Discharge Note   Anticipated Discharge Disposition Home   Hospital Resources/Appts/Education Provided Appointments scheduled and added to AVS

## 2023-11-28 NOTE — H&P
[facility] Medicine  History & Physical    Patient Name: Uma Bangura  MRN: [id number]  Patient Class: OP- Observation  Admission Date: 2023  Attending Physician: Danae Arias MD   Primary Care Provider: Marine Pillai MD         Patient information was obtained from patient, relative(s), past medical records, and ER records.     Subjective:     Principal Problem:TIA (transient ischemic attack)    Chief Complaint:   Chief Complaint   Patient presents with    left arm numbness      Started 1 pm  / arm feels heavy         HPI: Uma Bangura 43-year-old female who presents emergency room for evaluation of left arm numbness/weakness and left facial tingling.  The symptoms onset approximately 1:00 p.m. today.  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  She reports the symptoms did not resolve until several hours later when she came to the emergency room.  She denies any word-finding difficulty, dysarthria, slurred speech, or facial asymmetry.  She denies any recent illness or injury.  Denies fever or chills.  No aggravating alleviating factors.  Previous medical history includes GERD, anxiety, hyperlipidemia.  ER workup:  CBC and CMP were unremarkable.  Triglycerides elevated at 322.  CTA of the head and neck were negative for any acute findings.  MRI is pending at the time my exam.  Patient be admitted to Hospital Medicine for treatment management.  Patient placed on CVA pathway.  Consult Neurology.    Past Medical History:   Diagnosis Date    Anxiety state, unspecified     Disturbance of salivary secretion     Edema     GERD (gastroesophageal reflux disease)     Goiter, unspecified     Nonspecific elevation of levels of transaminase or lactic acid dehydrogenase (LDH)     Polyphagia(783.6)     Unspecified sleep apnea     Varices of other sites        Past Surgical History:   Procedure Laterality Date     SECTION      x 2    ENDOMETRIAL ABLATION       OOPHORECTOMY Left     Dermoid cyst    TUBAL LIGATION         Review of patient's allergies indicates:   Allergen Reactions    Sertraline      Other reaction(s): heartburn       No current facility-administered medications on file prior to encounter.     Current Outpatient Medications on File Prior to Encounter   Medication Sig    ammonium lactate (LAC-HYDRIN) 12 % lotion Apply 1 g topically 2 (two) times daily as needed for Dry Skin.    clobetasoL (TEMOVATE) 0.05 % external solution Apply 1 mL topically 2 (two) times daily.    cycloSPORINE (RESTASIS) 0.05 % ophthalmic emulsion Place 1 drop into both eyes 2 (two) times daily.    ferrous sulfate 325 (65 FE) MG EC tablet TAKE ONE TABLET BY MOUTH EVERY DAY (Patient taking differently: Take 325 mg by mouth once daily.)    magnesium gluconate (MAG-G) 27 mg magnesium (500 mg) Tab tablet Take 27 mg by mouth once.     Family History       Problem Relation (Age of Onset)    Cancer Mother    Diabetes Father    Hypertension Mother, Father          Tobacco Use    Smoking status: Never    Smokeless tobacco: Never   Substance and Sexual Activity    Alcohol use: Yes     Comment: occasionally    Drug use: No    Sexual activity: Yes     Partners: Male     Birth control/protection: See Surgical Hx     Comment:      Review of Systems   Constitutional:  Positive for activity change. Negative for chills, diaphoresis and fever.   HENT:  Negative for congestion, nosebleeds and tinnitus.    Eyes:  Negative for photophobia and visual disturbance.   Respiratory:  Negative for cough, chest tightness, shortness of breath and wheezing.    Cardiovascular:  Negative for chest pain, palpitations and leg swelling.   Gastrointestinal:  Negative for abdominal distention, abdominal pain, constipation, diarrhea, nausea and vomiting.   Endocrine: Negative for cold intolerance and heat intolerance.   Genitourinary:  Negative for difficulty urinating, dysuria, frequency, hematuria and urgency.    Musculoskeletal:  Negative for arthralgias, back pain and myalgias.   Skin:  Negative for pallor, rash and wound.   Allergic/Immunologic: Negative for immunocompromised state.   Neurological:  Positive for weakness and numbness. Negative for dizziness, tremors, facial asymmetry and speech difficulty.   Hematological:  Negative for adenopathy. Does not bruise/bleed easily.   Psychiatric/Behavioral:  Negative for confusion and sleep disturbance. The patient is not nervous/anxious.      Objective:     Vital Signs (Most Recent):  Temp: 97.2 °F (36.2 °C) (11/27/23 1558)  Pulse: 80 (11/27/23 1828)  Resp: 16 (11/27/23 1828)  BP: (!) 161/87 (11/27/23 1828)  SpO2: 100 % (11/27/23 1828) Vital Signs (24h Range):  Temp:  [97.2 °F (36.2 °C)] 97.2 °F (36.2 °C)  Pulse:  [79-89] 80  Resp:  [16] 16  SpO2:  [97 %-100 %] 100 %  BP: (129-163)/(82-87) 161/87     Weight: 112 kg (247 lb)  Body mass index is 39.87 kg/m².     Physical Exam  Vitals and nursing note reviewed.   Constitutional:       General: She is not in acute distress.     Appearance: She is well-developed. She is not diaphoretic.   HENT:      Head: Normocephalic.      Mouth/Throat:      Mouth: Mucous membranes are moist.      Pharynx: Oropharynx is clear. No oropharyngeal exudate or posterior oropharyngeal erythema.   Eyes:      General: No scleral icterus.     Conjunctiva/sclera: Conjunctivae normal.      Pupils: Pupils are equal, round, and reactive to light.   Neck:      Vascular: No JVD.   Cardiovascular:      Rate and Rhythm: Normal rate and regular rhythm.      Heart sounds: Normal heart sounds. No murmur heard.     No friction rub. No gallop.   Pulmonary:      Effort: Pulmonary effort is normal. No respiratory distress.      Breath sounds: Normal breath sounds. No wheezing or rales.   Abdominal:      General: Bowel sounds are normal. There is no distension.      Palpations: Abdomen is soft.      Tenderness: There is no abdominal tenderness. There is no guarding or  "rebound.   Musculoskeletal:         General: No tenderness. Normal range of motion.      Cervical back: Normal range of motion and neck supple.   Lymphadenopathy:      Cervical: No cervical adenopathy.   Skin:     General: Skin is warm and dry.      Capillary Refill: Capillary refill takes less than 2 seconds.      Coloration: Skin is not pale.      Findings: No erythema or rash.   Neurological:      Mental Status: She is alert and oriented to person, place, and time.      Cranial Nerves: No cranial nerve deficit.      Sensory: No sensory deficit.      Coordination: Coordination normal.      Deep Tendon Reflexes: Reflexes normal.   Psychiatric:         Behavior: Behavior normal.         Thought Content: Thought content normal.         Judgment: Judgment normal.              CRANIAL NERVES     CN III, IV, VI   Pupils are equal, round, and reactive to light.       Significant Labs: All pertinent labs within the past 24 hours have been reviewed.  CBC:   Recent Labs   Lab 11/27/23  1616   WBC 10.54   HGB 13.9   HCT 42.2        CMP:   Recent Labs   Lab 11/27/23  1616      K 4.1      CO2 26   GLU 87   BUN 18   CREATININE 0.9   CALCIUM 9.8   PROT 7.5   ALBUMIN 4.1   BILITOT 0.5   ALKPHOS 71   AST 27   ALT 30   ANIONGAP 12     Cardiac Markers: No results for input(s): "CKMB", "MYOGLOBIN", "BNP", "TROPISTAT" in the last 48 hours.    Significant Imaging: I have reviewed all pertinent imaging results/findings within the past 24 hours.    CTA    Impression:     1. Negative pre and postcontrast CT imaging of the head.  No acute process.  2. Negative CTA of the head and neck.  Assessment/Plan:     * TIA (transient ischemic attack)  Left facial numbness and tingling, left arm numbness and tingling  Antithrombotics for secondary stroke prevention: Antiplatelets: Aspirin: 81 mg daily    Statins for secondary stroke prevention and hyperlipidemia, if present:   Statins: Atorvastatin- 40 mg daily    Aggressive risk " factor modification: HLD     Rehab efforts: The patient has been evaluated by a stroke team provider and the therapy needs have been fully considered based off the presenting complaints and exam findings. The following therapy evaluations are needed: PT evaluate and treat, OT evaluate and treat, SLP evaluate and treat    Diagnostics ordered/pending: CTA Head to assess vasculature , CTA Neck/Arch to assess vasculature, HgbA1C to assess blood glucose levels, Lipid Profile to assess cholesterol levels, MRI head without contrast to assess brain parenchyma, TTE to assess cardiac function/status , TSH to assess thyroid function    VTE prophylaxis: Mechanical prophylaxis: Place SCDs    BP parameters: TIA: SBP <220 until imaging confirmation of no infarct           VTE Risk Mitigation (From admission, onward)           Ordered     IP VTE HIGH RISK PATIENT  Once         11/27/23 1843     Place sequential compression device  Until discontinued         11/27/23 1843     Place PIYUSH hose  Until discontinued         11/27/23 1843                              Braxton Groves NP  Department of Hospital Medicine  [facility]

## 2023-11-28 NOTE — PLAN OF CARE
Patient needs 3 part MRI to rule out MG before neuro can give DC clearance.     CM called MRI- soonest they can get patient down is 5:30 PM due to outpt schedule. CM asked about reading after scans are completed- states if made STAT then will be read shortly after imaging completed but without will not be read until tomorrow.   Orders changed to STAT

## 2023-11-28 NOTE — PHARMACY MED REC
"              .  Admission Medication History     The home medication history was taken by Piedad Mcpherson.    You may go to "Admission" then "Reconcile Home Medications" tabs to review and/or act upon these items.     The home medication list has been updated by the Pharmacy department.   Please read ALL comments highlighted in yellow.   Please address this information as you see fit.    Feel free to contact us if you have any questions or require assistance.        Medications listed below were obtained from: Patient/family and Analytic software- [employer]  No current facility-administered medications on file prior to encounter.     Current Outpatient Medications on File Prior to Encounter   Medication Sig Dispense Refill    ammonium lactate (LAC-HYDRIN) 12 % lotion Apply 1 g topically 2 (two) times daily as needed for Dry Skin.      clobetasoL (TEMOVATE) 0.05 % external solution Apply 1 mL topically 2 (two) times daily.      cycloSPORINE (RESTASIS) 0.05 % ophthalmic emulsion Place 1 drop into both eyes 2 (two) times daily.      ferrous sulfate 325 (65 FE) MG EC tablet TAKE ONE TABLET BY MOUTH EVERY DAY (Patient taking differently: Take 325 mg by mouth once daily.) 90 tablet 1    magnesium gluconate (MAG-G) 27 mg magnesium (500 mg) Tab tablet Take 27 mg by mouth once.      potassium citrate 99 mg Cap Take 1 capsule by mouth once daily.             Piedad Mcpherson  EXT 1924            "

## 2023-11-28 NOTE — PLAN OF CARE
AAO x 4, NSR on monitor, VSS, neuro checks ie 4 hrs, no complaints of pain, tingling or numbness, resting between care, will monitor.     Problem: Adult Inpatient Plan of Care  Goal: Plan of Care Review  Outcome: Ongoing, Progressing    Problem: Sensorimotor Impairment (Stroke, Ischemic/Transient Ischemic Attack)  Goal: Improved Sensorimotor Function  Outcome: Ongoing, Progressing     Problem: Cognitive Impairment (Stroke, Ischemic/Transient Ischemic Attack)  Goal: Optimal Cognitive Function  Outcome: Ongoing, Progressing     Problem: Functional Ability Impaired (Stroke, Ischemic/Transient Ischemic Attack)  Goal: Optimal Functional Ability  Outcome: Ongoing, Progressing     Problem: Fall Injury Risk  Goal: Absence of Fall and Fall-Related Injury  Outcome: Ongoing, Progressing

## 2023-11-28 NOTE — NURSING
Nurses Note -- 4 Eyes      11/28/2023   2:19 AM      Skin assessed during: Admit      [x] No Altered Skin Integrity Present    [x]Prevention Measures Documented      [] Yes- Altered Skin Integrity Present or Discovered   [] LDA Added if Not in Epic (Describe Wound)   [] New Altered Skin Integrity was Present on Admit and Documented in LDA   [] Wound Image Taken    Wound Care Consulted? No    Attending Nurse: Maira GRAHAM RN    Second RN/Staff Member:  Le SHIPMAN

## 2023-11-28 NOTE — SUBJECTIVE & OBJECTIVE
Past Medical History:   Diagnosis Date    Anxiety state, unspecified     Disturbance of salivary secretion     Edema     GERD (gastroesophageal reflux disease)     Goiter, unspecified     Nonspecific elevation of levels of transaminase or lactic acid dehydrogenase (LDH)     Polyphagia(783.6)     Unspecified sleep apnea     Varices of other sites        Past Surgical History:   Procedure Laterality Date     SECTION      x 2    ENDOMETRIAL ABLATION      OOPHORECTOMY Left     Dermoid cyst    TUBAL LIGATION         Review of patient's allergies indicates:   Allergen Reactions    Sertraline      Other reaction(s): heartburn       No current facility-administered medications on file prior to encounter.     Current Outpatient Medications on File Prior to Encounter   Medication Sig    ammonium lactate (LAC-HYDRIN) 12 % lotion Apply 1 g topically 2 (two) times daily as needed for Dry Skin.    clobetasoL (TEMOVATE) 0.05 % external solution Apply 1 mL topically 2 (two) times daily.    cycloSPORINE (RESTASIS) 0.05 % ophthalmic emulsion Place 1 drop into both eyes 2 (two) times daily.    ferrous sulfate 325 (65 FE) MG EC tablet TAKE ONE TABLET BY MOUTH EVERY DAY (Patient taking differently: Take 325 mg by mouth once daily.)    magnesium gluconate (MAG-G) 27 mg magnesium (500 mg) Tab tablet Take 27 mg by mouth once.     Family History       Problem Relation (Age of Onset)    Cancer Mother    Diabetes Father    Hypertension Mother, Father          Tobacco Use    Smoking status: Never    Smokeless tobacco: Never   Substance and Sexual Activity    Alcohol use: Yes     Comment: occasionally    Drug use: No    Sexual activity: Yes     Partners: Male     Birth control/protection: See Surgical Hx     Comment:      Review of Systems   Constitutional:  Positive for activity change. Negative for chills, diaphoresis and fever.   HENT:  Negative for congestion, nosebleeds and tinnitus.    Eyes:  Negative for photophobia and  visual disturbance.   Respiratory:  Negative for cough, chest tightness, shortness of breath and wheezing.    Cardiovascular:  Negative for chest pain, palpitations and leg swelling.   Gastrointestinal:  Negative for abdominal distention, abdominal pain, constipation, diarrhea, nausea and vomiting.   Endocrine: Negative for cold intolerance and heat intolerance.   Genitourinary:  Negative for difficulty urinating, dysuria, frequency, hematuria and urgency.   Musculoskeletal:  Negative for arthralgias, back pain and myalgias.   Skin:  Negative for pallor, rash and wound.   Allergic/Immunologic: Negative for immunocompromised state.   Neurological:  Positive for weakness and numbness. Negative for dizziness, tremors, facial asymmetry and speech difficulty.   Hematological:  Negative for adenopathy. Does not bruise/bleed easily.   Psychiatric/Behavioral:  Negative for confusion and sleep disturbance. The patient is not nervous/anxious.      Objective:     Vital Signs (Most Recent):  Temp: 97.2 °F (36.2 °C) (11/27/23 1558)  Pulse: 80 (11/27/23 1828)  Resp: 16 (11/27/23 1828)  BP: (!) 161/87 (11/27/23 1828)  SpO2: 100 % (11/27/23 1828) Vital Signs (24h Range):  Temp:  [97.2 °F (36.2 °C)] 97.2 °F (36.2 °C)  Pulse:  [79-89] 80  Resp:  [16] 16  SpO2:  [97 %-100 %] 100 %  BP: (129-163)/(82-87) 161/87     Weight: 112 kg (247 lb)  Body mass index is 39.87 kg/m².     Physical Exam  Vitals and nursing note reviewed.   Constitutional:       General: She is not in acute distress.     Appearance: She is well-developed. She is not diaphoretic.   HENT:      Head: Normocephalic.      Mouth/Throat:      Mouth: Mucous membranes are moist.      Pharynx: Oropharynx is clear. No oropharyngeal exudate or posterior oropharyngeal erythema.   Eyes:      General: No scleral icterus.     Conjunctiva/sclera: Conjunctivae normal.      Pupils: Pupils are equal, round, and reactive to light.   Neck:      Vascular: No JVD.   Cardiovascular:      Rate  "and Rhythm: Normal rate and regular rhythm.      Heart sounds: Normal heart sounds. No murmur heard.     No friction rub. No gallop.   Pulmonary:      Effort: Pulmonary effort is normal. No respiratory distress.      Breath sounds: Normal breath sounds. No wheezing or rales.   Abdominal:      General: Bowel sounds are normal. There is no distension.      Palpations: Abdomen is soft.      Tenderness: There is no abdominal tenderness. There is no guarding or rebound.   Musculoskeletal:         General: No tenderness. Normal range of motion.      Cervical back: Normal range of motion and neck supple.   Lymphadenopathy:      Cervical: No cervical adenopathy.   Skin:     General: Skin is warm and dry.      Capillary Refill: Capillary refill takes less than 2 seconds.      Coloration: Skin is not pale.      Findings: No erythema or rash.   Neurological:      Mental Status: She is alert and oriented to person, place, and time.      Cranial Nerves: No cranial nerve deficit.      Sensory: No sensory deficit.      Coordination: Coordination normal.      Deep Tendon Reflexes: Reflexes normal.   Psychiatric:         Behavior: Behavior normal.         Thought Content: Thought content normal.         Judgment: Judgment normal.              CRANIAL NERVES     CN III, IV, VI   Pupils are equal, round, and reactive to light.       Significant Labs: All pertinent labs within the past 24 hours have been reviewed.  CBC:   Recent Labs   Lab 11/27/23  1616   WBC 10.54   HGB 13.9   HCT 42.2        CMP:   Recent Labs   Lab 11/27/23  1616      K 4.1      CO2 26   GLU 87   BUN 18   CREATININE 0.9   CALCIUM 9.8   PROT 7.5   ALBUMIN 4.1   BILITOT 0.5   ALKPHOS 71   AST 27   ALT 30   ANIONGAP 12     Cardiac Markers: No results for input(s): "CKMB", "MYOGLOBIN", "BNP", "TROPISTAT" in the last 48 hours.    Significant Imaging: I have reviewed all pertinent imaging results/findings within the past 24 " hours.    CTA    Impression:     1. Negative pre and postcontrast CT imaging of the head.  No acute process.  2. Negative CTA of the head and neck.

## 2023-11-28 NOTE — ASSESSMENT & PLAN NOTE
Left facial numbness and tingling, left arm numbness and tingling  Antithrombotics for secondary stroke prevention: Antiplatelets: Aspirin: 81 mg daily    Statins for secondary stroke prevention and hyperlipidemia, if present:   Statins: Atorvastatin- 40 mg daily    Aggressive risk factor modification: HLD     Rehab efforts: The patient has been evaluated by a stroke team provider and the therapy needs have been fully considered based off the presenting complaints and exam findings. The following therapy evaluations are needed: PT evaluate and treat, OT evaluate and treat, SLP evaluate and treat    Diagnostics ordered/pending: CTA Head to assess vasculature , CTA Neck/Arch to assess vasculature, HgbA1C to assess blood glucose levels, Lipid Profile to assess cholesterol levels, MRI head without contrast to assess brain parenchyma, TTE to assess cardiac function/status , TSH to assess thyroid function    VTE prophylaxis: Mechanical prophylaxis: Place SCDs    BP parameters: TIA: SBP <220 until imaging confirmation of no infarct

## 2023-11-28 NOTE — ED NOTES
Upon transfer to room 214, patient is AAOx4, no cardiac or respiratory complications. No needs or questions at this time.

## 2023-11-28 NOTE — PT/OT/SLP EVAL
"Speech Language Pathology Evaluation  Cognitive/Bedside Swallow    Patient Name:  Uma Bangura   MRN:  [id number]  Admitting Diagnosis: TIA (transient ischemic attack)    Recommendations:                  General Recommendations:  Follow-up not indicated  Diet recommendations:  Regular Diet - IDDSI Level 7, Thin liquids - IDDSI Level 0   Aspiration Precautions: Standard aspiration precautions   General Precautions: Standard,    Communication strategies:  none    Assessment:     Uma Bangura is a 43 y.o. female with an admitting diagnosis of  TIA .  She presents with swallowing and cognitive-linguistic status WFL. No overt s/s aspiration or oropharyngeal dysphagia; rec regular diet w/ thin liquids. No speech, language, or cognitive deficits noted. Rec pt return home; no ST needs.    History:   Per H&P:  "HPI: Uma Bangura 43-year-old female who presents emergency room for evaluation of left arm numbness/weakness and left facial tingling.  The symptoms onset approximately 1:00 p.m. today.  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  She reports the symptoms did not resolve until several hours later when she came to the emergency room.  She denies any word-finding difficulty, dysarthria, slurred speech, or facial asymmetry.  She denies any recent illness or injury.  Denies fever or chills.  No aggravating alleviating factors.  Previous medical history includes GERD, anxiety, hyperlipidemia.  ER workup:  CBC and CMP were unremarkable.  Triglycerides elevated at 322.  CTA of the head and neck were negative for any acute findings.  MRI is pending at the time my exam.  Patient be admitted to Hospital Medicine for treatment management.  Patient placed on CVA pathway.  Consult Neurology."    Past Medical History:   Diagnosis Date    Anxiety state, unspecified     Disturbance of salivary secretion     Edema     GERD (gastroesophageal reflux disease)     Goiter, unspecified     Nonspecific " elevation of levels of transaminase or lactic acid dehydrogenase (LDH)     Polyphagia(783.6)     Unspecified sleep apnea     Varices of other sites        Past Surgical History:   Procedure Laterality Date     SECTION      x 2    ENDOMETRIAL ABLATION      OOPHORECTOMY Left     Dermoid cyst    TUBAL LIGATION         Social History: Patient lives with her family.    Prior Intubation HX:  none this admit    Modified Barium Swallow: none found in epic or reported by pt     Imaging:   Imaging Results              MRI Brain Without Contrast (Final result)  Result time 23 06:37:50      Final result by Navneet Payne MD (23 06:37:50)                   Impression:      1. There is no acute abnormality.  There is no intracranial hemorrhage, mass/mass effect, acute edema or ischemia.  Note: Preliminary results were provided by Dr. Igor Mccormick ([facility]).  There is no significant discrepancy.      Electronically signed by: Navneet Payne MD  Date:    2023  Time:    06:37               Narrative:    EXAM:  MRI BRAIN WITHOUT CONTRAST    CLINICAL HISTORY:  left arm numbness; .    COMPARISON:  CTA head and neck obtained the same day    FINDINGS:  Intracranial contents:There is no acute intracranial abnormality.  Brain volume, ventricular size and position are normal.  There is no intracranial hemorrhage or mass/mass effect.  There are no regions of restricted diffusion to suggest acute infarction.  There are just a few punctate foci of white matter FLAIR hyperintense signal which are completely nonspecific.  There is no hydrocephalus or midline shift.  There is no abnormal extra-axial fluid collection.  The basilar cisterns are open.  Flow voids indicating patency are present in the major vessels at the base of the brain.  The cerebellar tonsils are in normal position.  The sellar structures are normal.  The orbits are grossly normal.    Extracranial contents, calvarium, soft tissues: There is  normal marrow signal intensity in the clivus and calvarium. There is trace scattered mucosal thickening in the ethmoid air cells.  Otherwise, the paranasal sinuses and mastoid air cells are clear.                                       CTA Head and Neck (xpd) (Final result)  Result time 11/27/23 16:38:40      Final result by Anders Webb MD (11/27/23 16:38:40)                   Impression:      1. Negative pre and postcontrast CT imaging of the head.  No acute process.  2. Negative CTA of the head and neck.  The critical information above was relayed directly by Anders Webb secure chat to Jeff Santizo Jr. on 11/27/2023 at 16:36.      Electronically signed by: Anders Webb  Date:    11/27/2023  Time:    16:38               Narrative:    EXAMINATION:  CTA HEAD AND NECK (XPD)    CLINICAL HISTORY:  Neuro deficit, acute, stroke suspected;    TECHNIQUE:  Non contrast low dose axial images were obtained through the head. CT angiogram was performed from the level of the joey to the top of the head following the IV administration of 75ML of Omnipaque 350.   Sagittal and coronal reconstructions and maximum intensity projection reconstructions were performed. Arterial stenosis percentages are based on NASCET measurement criteria.    COMPARISON:  Head CT 12/09/2022.    FINDINGS:  CTA HEAD:    Vasculature: The intracranial arteries show normal anatomy without evidence of major branch occlusion or focal luminal narrowing.There is no suspicion of vascular malformation or saccular aneurysm.    Variant anatomy: Developmentally diminutive distal right vertebral artery and left A1 CLEMENTINA.    Brain: There is no evidence of mass, mass effect, edema, midline shift, intracranial hemorrhage, or space-occupying extra-axial fluid collection. After the administration of contrast there is no abnormal enhancement.    Ventricles/Sulci: The ventricles and sulci are appropriate in size for age.    Osseous Structures: The osseous structures are  unremarkable in appearance.    Sinuses and orbits: The visualized portions of the paranasal sinuses and orbits are unremarkable.    Other: Visualized portions of the mastoids are unremarkable.    CTA NECK:    Aorta: Conventional branching pattern. The great vessel origins are patent without flow limiting stenosis.    Vertebral Arteries: The origins of the vertebral arteries are patent.  No flow limiting stenosis, occlusion, or dissection.    Right Carotid: No flow limiting stenosis, occlusion, or dissection of the common carotid or internal carotid arteries.  No significant plaque of the proximal ICA.  Right internal carotid artery: 0 % stenosis by and NASCET.    Left Carotid: No flow limiting stenosis, occlusion, or dissection of the common carotid or internal carotid arteries. No significant plaque of the proximal ICA. Right internal carotid artery: 0 % stenosis by and NASCET.    Extravascular Anatomy: No definite adjacent soft tissue abnormality seen accounting for technique used.                                        Prior diet: regular, thin.    Occupation/hobbies/homemaking: pt is a .    Subjective     Pt awake finishing Echo; family at bedside. Pt agreeable to ST evaluation.   Patient goals: to go home     Pain/Comfort:       Respiratory Status: Room air    Objective:     Cognitive Status:      MoCA (Version 8.1) administered with pt achieving a score of 27/30 suggesting absence of cognitive-linguistic impairment. Pt earned 5 of 5 points on visuospatial/executive functions, 3 of 3 points on naming, 4 of 6 points for attention, 3 of 3 points for language, 2 of 2 points for abstraction, 4 of 5 points for delayed recall and 6 of 6 points for orientation.       Receptive Language:   Comprehension:      WFL    Pragmatics:    Appropriate    Expressive Language:  Verbal:    WFL      Motor Speech:  WFL    Voice:     Adequate for age, sex, size.     Visual-Spatial:  WFL    Reading:   WFL      Written Expression:   WFL    Oral Musculature Evaluation  Oral Musculature: WFL  Dentition: present and adequate  Secretion Management: adequate  Mucosal Quality: good  Mandibular Strength and Mobility: WFL  Oral Labial Strength and Mobility: WFL  Lingual Strength and Mobility: WFL  Velar Elevation: WFL  Buccal Strength and Mobility: WFL  Volitional Cough: elicted; adequate  Volitional Swallow: palpated; adequate laryngeal movement  Voice Prior to PO Intake: clear    Bedside Swallow Eval:   Consistencies Assessed:  Thin liquids water via cup and straw  Puree tsp bites applesauce  Mixed consistencies tsp bites diced peaches in thin juice  Solids cracker      Oral Phase:   WFL    Pharyngeal Phase:   no overt clinical signs/symptoms of aspiration  no overt clinical signs/symptoms of pharyngeal dysphagia    Compensatory Strategies  None    Treatment: Pt and family educated re purpose of evaluation, role of SLP, results of evaluation, and recs. Pt expressed understanding of recs.      Goals:   Multidisciplinary Problems       SLP Goals       Not on file                    Plan:     Patient to be seen:      Plan of Care expires:     Plan of Care reviewed with:  patient, family   SLP Follow-Up:  No       Discharge recommendations:  Therapy Intensity Recommendations at Discharge: No Therapy Indicated   Barriers to Discharge:  None    Time Tracking:     SLP Treatment Date:   11/28/23  Speech Start Time:  0918  Speech Stop Time:  0947     Speech Total Time (min):  29 min    Billable Minutes: Eval speech, cognitive-linguistic 15 min  and Eval Swallow and Oral Function 14 min    11/28/2023

## 2023-11-28 NOTE — PLAN OF CARE
[facility] - Med/Surg  Initial Discharge Assessment       Primary Care Provider: Marine Pillai MD    Admission Diagnosis: Acute focal neurological deficit [R29.818]    Admission Date: 11/27/2023  Expected Discharge Date: 11/28/2023    Met with pt at bedside to complete discharge assessment, verified PCP, pharmacy and information on facesheet.  No HH, DME, dialysis or  coumadin.   Pt will drive pt home.  No needs identified at this time.    Transition of Care Barriers: None    Payor: BLUE [employer] BLUE SHIELD / Plan: BCBS OF [place] [employer] EPO / Product Type: Commercial /     Extended Emergency Contact Information  Primary Emergency Contact: BanguraTyson III  Address: [address] [place] [address] [place] [address] [place]  Mobile Phone: [phone]  Relation: Spouse    Discharge Plan A: Home with family  Discharge Plan B: Home with family      [facility] - [place] - [address]  [address]  [place] [address]  Phone: [phone] Fax: [phone]    [id number] - [place] - [address] [facility] [address]  [address]  [place] [address]  Phone: [phone] Fax: [phone]      Initial Assessment (most recent)       Adult Discharge Assessment - 11/28/23 1122          Discharge Assessment    Assessment Type Discharge Planning Assessment     Confirmed/corrected address, phone number and insurance Yes     Confirmed Demographics Correct on Facesheet     Source of Information patient     Communicated SARA with patient/caregiver No     People in Home spouse;child(ricky), dependent     Do you expect to return to your current living situation? Yes     Prior to hospitilization cognitive status: Alert/Oriented     Current cognitive status: Alert/Oriented     Home Accessibility wheelchair accessible     Home Layout Able to live on 1st floor     Equipment Currently Used at Home none     Readmission within 30 days? No      Patient currently being followed by outpatient case management? No     Do you currently have service(s) that help you manage your care at home? No     Do you take prescription medications? Yes     Do you have prescription coverage? Yes     Coverage BCBS     Do you have any problems affording any of your prescribed medications? No     Is the patient taking medications as prescribed? yes     Who is going to help you get home at discharge? self     How do you get to doctors appointments? car, drives self     Are you on dialysis? No     Do you take coumadin? No     DME Needed Upon Discharge  none     Discharge Plan discussed with: Patient     Transition of Care Barriers None     Discharge Plan A Home with family     Discharge Plan B Home with family

## 2023-11-28 NOTE — CONSULTS
[facility]/Surg  Neurology  Consult Note    Patient Name: Uma Bangura  MRN: [id number]  Admission Date: 11/27/2023  Hospital Length of Stay: 0 days  Code Status: Full Code   Attending Provider: Danae Arias MD   Consulting Provider: Kaylyn Grossman DNP  Primary Care Physician: Marine Pillai MD  Principal Problem:TIA (transient ischemic attack)    Inpatient consult to Neurology  Consult performed by: Kaylyn Grossman DNP  Consult ordered by: Braxton Groves NP        Subjective:     Chief Complaint:  left arm numbness  starting yesterday afternoon at 1 pm    HPI: as per EMR: Uma Bangura 43-year-old female who presents emergency room for evaluation of left arm numbness/weakness and left facial tingling.  The symptoms onset approximately 1:00 p.m. today.  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  She reports the symptoms did not resolve until several hours later when she came to the emergency room.  She denies any word-finding difficulty, dysarthria, slurred speech, or facial asymmetry.  She denies any recent illness or injury.  Denies fever or chills.  No aggravating alleviating factors.  Previous medical history includes GERD, anxiety, hyperlipidemia.  ER workup:  CBC and CMP were unremarkable.  Triglycerides elevated at 322.  CTA of the head and neck were negative for any acute findings.  MRI is pending at the time my exam.  Patient be admitted to Hospital Medicine for treatment management.  Patient placed on CVA pathway.  Consult Neurology.     Neurology Consult Note: PAatient seen and examined, POC discussed with Dr Zuinga. She is alert and oriented x 3. She reports improvement in LA numbness but reports symptoms are still present. She denies weakness, vision deficit, speech difficulty, facial weakness, or headache. 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 She also reports she has history of tension HAs that starts  with tightness of muscle in shoulders and neck radiates upward.     Past Medical History:   Diagnosis Date    Anxiety state, unspecified     Disturbance of salivary secretion     Edema     GERD (gastroesophageal reflux disease)     Goiter, unspecified     Nonspecific elevation of levels of transaminase or lactic acid dehydrogenase (LDH)     Polyphagia(783.6)     Unspecified sleep apnea     Varices of other sites        Past Surgical History:   Procedure Laterality Date     SECTION      x 2    ENDOMETRIAL ABLATION      OOPHORECTOMY Left     Dermoid cyst    TUBAL LIGATION         Review of patient's allergies indicates:   Allergen Reactions    Sertraline      Other reaction(s): heartburn       Current Neurological Medications: aspirin, atorvastatin     No current facility-administered medications on file prior to encounter.     Current Outpatient Medications on File Prior to Encounter   Medication Sig    ammonium lactate (LAC-HYDRIN) 12 % lotion Apply 1 g topically 2 (two) times daily as needed for Dry Skin.    clobetasoL (TEMOVATE) 0.05 % external solution Apply 1 mL topically 2 (two) times daily.    cycloSPORINE (RESTASIS) 0.05 % ophthalmic emulsion Place 1 drop into both eyes 2 (two) times daily.    ferrous sulfate 325 (65 FE) MG EC tablet TAKE ONE TABLET BY MOUTH EVERY DAY (Patient taking differently: Take 325 mg by mouth once daily.)    magnesium gluconate (MAG-G) 27 mg magnesium (500 mg) Tab tablet Take 27 mg by mouth once.    potassium citrate 99 mg Cap Take 1 capsule by mouth once daily.      Family History       Problem Relation (Age of Onset)    Cancer Mother    Diabetes Father    Hypertension Mother, Father          Tobacco Use    Smoking status: Never    Smokeless tobacco: Never   Substance and Sexual Activity    Alcohol use: Yes     Comment: occasionally    Drug use: No    Sexual activity: Yes     Partners: Male     Birth control/protection: See Surgical Hx     Comment:      Review of  Systems   Constitutional: Negative.    HENT: Negative.     Eyes: Negative.    Respiratory: Negative.     Cardiovascular: Negative.    Musculoskeletal:  Positive for myalgias.   Neurological:  Positive for numbness. Negative for dizziness, facial asymmetry, speech difficulty, weakness and headaches.     Objective:     Vital Signs (Most Recent):  Temp: 97.7 °F (36.5 °C) (11/28/23 1130)  Pulse: 83 (11/28/23 1130)  Resp: 20 (11/28/23 1130)  BP: 132/76 (11/28/23 1130)  SpO2: 98 % (11/28/23 1130) Vital Signs (24h Range):  Temp:  [97.2 °F (36.2 °C)-98.5 °F (36.9 °C)] 97.7 °F (36.5 °C)  Pulse:  [68-89] 83  Resp:  [16-20] 20  SpO2:  [97 %-100 %] 98 %  BP: (105-222)/(57-87) 132/76     Weight: 110.2 kg (243 lb)  Body mass index is 39.22 kg/m².    Physical Exam  Vitals and nursing note reviewed.   HENT:      Nose: Nose normal.      Mouth/Throat:      Mouth: Mucous membranes are moist.      Pharynx: Oropharynx is clear.   Eyes:      Extraocular Movements: EOM normal.      Conjunctiva/sclera: Conjunctivae normal.      Pupils: Pupils are equal, round, and reactive to light.   Cardiovascular:      Rate and Rhythm: Normal rate.   Pulmonary:      Effort: Pulmonary effort is normal.   Musculoskeletal:         General: Normal range of motion.      Cervical back: Normal range of motion.      Comments: Increased tone noted to neck and shoulder muscles    Neurological:      Mental Status: She is alert and oriented to person, place, and time.      Motor: Motor strength is normal.     Coordination: Finger-Nose-Finger Test normal.      Deep Tendon Reflexes:      Reflex Scores:       Tricep reflexes are 1+ on the right side and 1+ on the left side.       Bicep reflexes are 1+ on the right side and 1+ on the left side.       Brachioradialis reflexes are 1+ on the right side and 1+ on the left side.       Patellar reflexes are 3+ on the right side and 3+ on the left side.  Psychiatric:         Speech: Speech normal.         NEUROLOGICAL  EXAMINATION:     MENTAL STATUS   Oriented to person, place, and time.   Follows 1 step commands.   Attention: normal.   Speech: speech is normal   Level of consciousness: alert  Knowledge: good.   Able to name object. Able to repeat. Normal comprehension.     CRANIAL NERVES     CN II   Right visual field deficit: none  Left visual field deficit: none     CN III, IV, VI   Pupils are equal, round, and reactive to light.  Extraocular motions are normal.   Right pupil: Size: 4 mm. Shape: regular. Reactivity: brisk.   Left pupil: Size: 4 mm. Shape: regular. Reactivity: brisk.     CN V   Facial sensation intact.     CN VII   Facial expression full, symmetric.     CN VIII   Hearing: intact    CN XI   CN XI normal.     CN XII   Tongue deviation: none    MOTOR EXAM   Muscle bulk: normal  Overall muscle tone: normal    Strength   Strength 5/5 throughout.     REFLEXES     Reflexes   Right brachioradialis: 1+  Left brachioradialis: 1+  Right biceps: 1+  Left biceps: 1+  Right triceps: 1+  Left triceps: 1+  Right patellar: 3+  Left patellar: 3+    SENSORY EXAM   Light touch normal.     GAIT AND COORDINATION     Gait  Gait: (not tested)     Coordination   Finger to nose coordination: normal    Tremor   Resting tremor: absent      Significant Labs: Hemoglobin A1c:   Recent Labs   Lab 11/27/23  1616   HGBA1C 5.7     BMP:   Recent Labs   Lab 11/27/23  1616 11/28/23  0442   GLU 87 98    140   K 4.1 4.0    104   CO2 26 25   BUN 18 16   CREATININE 0.9 1.0   CALCIUM 9.8 9.0   MG  --  2.0     CBC:   Recent Labs   Lab 11/27/23 1616 11/28/23  0442   WBC 10.54 8.06   HGB 13.9 13.4   HCT 42.2 40.4    188     CMP:   Recent Labs   Lab 11/27/23 1616 11/28/23  0442   GLU 87 98    140   K 4.1 4.0    104   CO2 26 25   BUN 18 16   CREATININE 0.9 1.0   CALCIUM 9.8 9.0   MG  --  2.0   PROT 7.5 6.5   ALBUMIN 4.1 3.6   BILITOT 0.5 0.5   ALKPHOS 71 63   AST 27 25   ALT 30 29   ANIONGAP 12 11     All pertinent lab results  from the past 24 hours have been reviewed.    Significant Imaging: I have reviewed all pertinent imaging results/findings within the past 24 hours.    CTA Head and Neck (xpd)  Order: [id number]  Status: Final result       Visible to patient: Yes (seen)       Next appt: 12/08/2023 at 04:00 PM in Family Medicine (Tyrone Rubio NP)    0 Result Notes  Details    Reading Physician Reading Date Result Priority   Anders Webb MD  [phone] 11/27/2023 STAT     Narrative & Impression  EXAMINATION:  CTA HEAD AND NECK (XPD)     CLINICAL HISTORY:  Neuro deficit, acute, stroke suspected;     TECHNIQUE:  Non contrast low dose axial images were obtained through the head. CT angiogram was performed from the level of the joey to the top of the head following the IV administration of 75ML of Omnipaque 350.   Sagittal and coronal reconstructions and maximum intensity projection reconstructions were performed. Arterial stenosis percentages are based on NASCET measurement criteria.     COMPARISON:  Head CT 12/09/2022.     FINDINGS:  CTA HEAD:     Vasculature: The intracranial arteries show normal anatomy without evidence of major branch occlusion or focal luminal narrowing.There is no suspicion of vascular malformation or saccular aneurysm.     Variant anatomy: Developmentally diminutive distal right vertebral artery and left A1 CLEMENTINA.     Brain: There is no evidence of mass, mass effect, edema, midline shift, intracranial hemorrhage, or space-occupying extra-axial fluid collection. After the administration of contrast there is no abnormal enhancement.     Ventricles/Sulci: The ventricles and sulci are appropriate in size for age.     Osseous Structures: The osseous structures are unremarkable in appearance.     Sinuses and orbits: The visualized portions of the paranasal sinuses and orbits are unremarkable.     Other: Visualized portions of the mastoids are unremarkable.     CTA NECK:     Aorta: Conventional branching pattern.  The great vessel origins are patent without flow limiting stenosis.     Vertebral Arteries: The origins of the vertebral arteries are patent.  No flow limiting stenosis, occlusion, or dissection.     Right Carotid: No flow limiting stenosis, occlusion, or dissection of the common carotid or internal carotid arteries.  No significant plaque of the proximal ICA.  Right internal carotid artery: 0 % stenosis by and NASCET.     Left Carotid: No flow limiting stenosis, occlusion, or dissection of the common carotid or internal carotid arteries. No significant plaque of the proximal ICA. Right internal carotid artery: 0 % stenosis by and NASCET.     Extravascular Anatomy: No definite adjacent soft tissue abnormality seen accounting for technique used.     Impression:     1. Negative pre and postcontrast CT imaging of the head.  No acute process.  2. Negative CTA of the head and neck.  The critical information above was relayed directly by Anders Webb secure chat to Jeff Santizo Jr. on 11/27/2023 at 16:36.        Electronically signed by: Anders Webb  Date:                                            11/27/2023  Time:                                           16:38     Assessment and Plan:    Left arm numbness   Rule out TIA   Rule out Demyelinating disease     CTA h/n: negative   MRI brain wo contrast: negative for acute intracranial abnormality   Obtain MRI brain w contrast   Obtain MRI C and T spine w and wo contrast    Continue aspirin 81 mg and atorvastatin 40 mg for now for stroke prevention   Safety precautions in place   PT/OT/ST eval and treat      Patient to follow up with [facility] at [phone] within 2 weeks from discharge.  Stroke education was provided including stroke risk factors modification and any acute neurological changes including weakness, confusion, visual changes to come straight to the ER.  Seizure educaation was provided including no driving, no swimming by self, no operation of  heavy machinery or climbing on ladders.  All side effects of new medications were discussed with patient and/or next of kin and all questions were answered.       Active Diagnoses:    Diagnosis Date Noted POA    PRINCIPAL PROBLEM:  TIA (transient ischemic attack) [G45.9] 11/27/2023 Yes      Problems Resolved During this Admission:       VTE Risk Mitigation (From admission, onward)           Ordered     IP VTE HIGH RISK PATIENT  Once         11/27/23 1843     Place sequential compression device  Until discontinued         11/27/23 1843     Place PIYUSH hose  Until discontinued         11/27/23 1843                    Thank you for your consult. I will follow-up with patient. Please contact us if you have any additional questions.    Kaylyn Grossman, EFE  Neurology  [facility]/Surg

## 2023-11-28 NOTE — HPI
Uma Bangura 43-year-old female who presents emergency room for evaluation of left arm numbness/weakness and left facial tingling.  The symptoms onset approximately 1:00 p.m. today.  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  She reports the symptoms did not resolve until several hours later when she came to the emergency room.  She denies any word-finding difficulty, dysarthria, slurred speech, or facial asymmetry.  She denies any recent illness or injury.  Denies fever or chills.  No aggravating alleviating factors.  Previous medical history includes GERD, anxiety, hyperlipidemia.  ER workup:  CBC and CMP were unremarkable.  Triglycerides elevated at 322.  CTA of the head and neck were negative for any acute findings.  MRI is pending at the time my exam.  Patient be admitted to Hospital Medicine for treatment management.  Patient placed on CVA pathway.  Consult Neurology.

## 2023-11-28 NOTE — CONSULTS
Food & Nutrition  Education    Diet Education: Cardiac Educations  Time Spent: RD remote  Learners:Patient       Nutrition Education provided with handouts:   + Clinical Reference attachments to d/c documents  Cardiac educations    Comments:  Patient is on a cardiac diet with good appetite and meal consumption noted at 100%. No tolerance issues reported. SLP evaluation recommends regular diet with thin liquids. Labs reviewed.  NKFA.  LBM: 11/26/2023. I/O since admit +930mls.  Cardiac educations attached to discharge paperwork.  On site RD to follow up with education and diet questions at next patient visit.  RD to continue to monitor.     Patient Active Problem List   Diagnosis    GERD (gastroesophageal reflux disease)    Nodular goiter    Unspecified sleep apnea    Nonspecific elevation of levels of transaminase or lactic acid dehydrogenase (LDH)    Anxiety state, unspecified    Malaise and fatigue    Herpes zoster    Edema    Normocytic anemia    Hyperlipidemia    TIA (transient ischemic attack)     BMP  Lab Results   Component Value Date     11/28/2023    K 4.0 11/28/2023     11/28/2023    CO2 25 11/28/2023    BUN 16 11/28/2023    CREATININE 1.0 11/28/2023    CALCIUM 9.0 11/28/2023    ANIONGAP 11 11/28/2023    EGFRNORACEVR >60 11/28/2023     Lab Results   Component Value Date    HGBA1C 5.7 11/27/2023     Lab Results   Component Value Date    CALCIUM 9.0 11/28/2023    PHOS 3.6 11/28/2023         All questions and concerns answered. Dietitian's contact information provided.       Follow-Up: Yes      Please Re-consult as needed        Thanks,  Cathy Milian, MS, RDN, LDN

## 2023-11-28 NOTE — PT/OT/SLP EVAL
Occupational Therapy   Evaluation and Discharge Note    Name: Uma Bangura  MRN: [id number]  Admitting Diagnosis: TIA (transient ischemic attack)  Recent Surgery: * No surgery found *      Recommendations:     Discharge Recommendations: No Therapy Indicated  Discharge Equipment Recommendations: none  Barriers to discharge:  None    Assessment:     Uma Bangura is a 43 y.o. female with a medical diagnosis of TIA (transient ischemic attack). At this time, patient is independent with ADLs. Patient does not require further acute OT services.     Plan:     During this hospitalization, patient does not require further acute OT services.  Please re-consult if situation changes.    Plan of Care Reviewed with: patient    Subjective     Chief Complaint: none  Patient/Family Comments/goals: none    Occupational Profile:  Living Environment: Patient lives with  [facility].   Previous level of function: Patient was independent with ADLs and mobility.  Roles and Routines: Patient works as a .  Equipment Used at home: none  Assistance upon Discharge: Patient will not require further assistance post discharge.     Pain/Comfort:  Pain Rating 1: 0/10  Pain Rating Post-Intervention 1: 0/10    Patients cultural, spiritual, Episcopal conflicts given the current situation: no    Objective:     Communicated with: nurse Arias prior to session.  Patient found HOB elevated with telemetry upon OT entry to room.    General Precautions: Standard,    Orthopedic Precautions: N/A  Braces: N/A  Respiratory Status: Room air     Occupational Performance:    Bed Mobility:    Patient completed Scooting/Bridging with independence  Patient completed Supine to Sit with independence  Patient completed Sit to Supine with independence    Functional Mobility/Transfers:  Patient completed Sit <> Stand Transfer with independence  with  no assistive device   Patient completed Toilet Transfer Stand Pivot technique with independence  with  no AD    Activities of Daily Living:  Grooming: independence with oral and facial hygiene while standing at sink  Lower Body Dressing: independence to don/doff socks while seated EOB  Toileting: independence with clair-hygiene after voiding while seated on toilet    Cognitive/Visual Perceptual:  Cognitive/Psychosocial Skills:     -       Oriented to: x4   -       Follows Commands/attention:Follows multistep  commands  -       Communication: clear/fluent  -       Safety awareness/insight to disability: intact   -       Mood/Affect/Coping skills/emotional control: Appropriate to situation and Cooperative  Visual/Perceptual:      -Intact     Physical Exam:  Postural examination/scapula alignment:    -       Rounded shoulders  -       Forward head  Upper Extremity Range of Motion:     -       Right Upper Extremity: WFL  -       Left Upper Extremity: WFL  Upper Extremity Strength:    -       Right Upper Extremity: WFL  -       Left Upper Extremity: WFL   Strength:    -       Right Upper Extremity: WFL  -       Left Upper Extremity: WFL  Fine Motor Coordination:    -       Intact  Gross motor coordination:   WFL    AMPAC 6 Click ADL:  AMPAC Total Score: 24      Patient left sitting edge of bed with  family and PT present    GOALS:   Multidisciplinary Problems       Occupational Therapy Goals       Not on file                    History:     Past Medical History:   Diagnosis Date    Anxiety state, unspecified     Disturbance of salivary secretion     Edema     GERD (gastroesophageal reflux disease)     Goiter, unspecified     Nonspecific elevation of levels of transaminase or lactic acid dehydrogenase (LDH)     Polyphagia(783.6)     Unspecified sleep apnea     Varices of other sites          Past Surgical History:   Procedure Laterality Date     SECTION      x 2    ENDOMETRIAL ABLATION      OOPHORECTOMY Left     Dermoid cyst    TUBAL LIGATION         Time Tracking:     OT Date of Treatment: 23  OT  Start Time: 0950  OT Stop Time: 0958  OT Total Time (min): 8 min    Billable Minutes:Evaluation 8    11/28/2023

## 2023-11-29 LAB
ALBUMIN SERPL BCP-MCNC: 3.6 G/DL (ref 3.5–5.2)
ALP SERPL-CCNC: 62 U/L (ref 55–135)
ALT SERPL W/O P-5'-P-CCNC: 31 U/L (ref 10–44)
ANION GAP SERPL CALC-SCNC: 9 MMOL/L (ref 8–16)
AST SERPL-CCNC: 24 U/L (ref 10–40)
BASOPHILS # BLD AUTO: 0.06 K/UL (ref 0–0.2)
BASOPHILS NFR BLD: 0.8 % (ref 0–1.9)
BILIRUB SERPL-MCNC: 0.5 MG/DL (ref 0.1–1)
BUN SERPL-MCNC: 18 MG/DL (ref 6–20)
CALCIUM SERPL-MCNC: 9.3 MG/DL (ref 8.7–10.5)
CHLORIDE SERPL-SCNC: 105 MMOL/L (ref 95–110)
CO2 SERPL-SCNC: 27 MMOL/L (ref 23–29)
CREAT SERPL-MCNC: 1.1 MG/DL (ref 0.5–1.4)
DIFFERENTIAL METHOD: ABNORMAL
EOSINOPHIL # BLD AUTO: 0.2 K/UL (ref 0–0.5)
EOSINOPHIL NFR BLD: 2.1 % (ref 0–8)
ERYTHROCYTE [DISTWIDTH] IN BLOOD BY AUTOMATED COUNT: 12.2 % (ref 11.5–14.5)
EST. GFR  (NO RACE VARIABLE): >60 ML/MIN/1.73 M^2
GLUCOSE SERPL-MCNC: 99 MG/DL (ref 70–110)
HCT VFR BLD AUTO: 42.2 % (ref 37–48.5)
HGB BLD-MCNC: 13.9 G/DL (ref 12–16)
IMM GRANULOCYTES # BLD AUTO: 0.01 K/UL (ref 0–0.04)
IMM GRANULOCYTES NFR BLD AUTO: 0.1 % (ref 0–0.5)
LYMPHOCYTES # BLD AUTO: 4 K/UL (ref 1–4.8)
LYMPHOCYTES NFR BLD: 50 % (ref 18–48)
MCH RBC QN AUTO: 28.9 PG (ref 27–31)
MCHC RBC AUTO-ENTMCNC: 32.9 G/DL (ref 32–36)
MCV RBC AUTO: 88 FL (ref 82–98)
MONOCYTES # BLD AUTO: 0.5 K/UL (ref 0.3–1)
MONOCYTES NFR BLD: 6.8 % (ref 4–15)
NEUTROPHILS # BLD AUTO: 3.2 K/UL (ref 1.8–7.7)
NEUTROPHILS NFR BLD: 40.2 % (ref 38–73)
NRBC BLD-RTO: 0 /100 WBC
PLATELET # BLD AUTO: 193 K/UL (ref 150–450)
PMV BLD AUTO: 10.4 FL (ref 9.2–12.9)
POTASSIUM SERPL-SCNC: 4.3 MMOL/L (ref 3.5–5.1)
PROT SERPL-MCNC: 6.8 G/DL (ref 6–8.4)
RBC # BLD AUTO: 4.81 M/UL (ref 4–5.4)
SODIUM SERPL-SCNC: 141 MMOL/L (ref 136–145)
WBC # BLD AUTO: 7.94 K/UL (ref 3.9–12.7)

## 2023-11-29 PROCEDURE — 99204 PR OFFICE/OUTPT VISIT, NEW, LEVL IV, 45-59 MIN: ICD-10-PCS | Mod: ,,, | Performed by: STUDENT IN AN ORGANIZED HEALTH CARE EDUCATION/TRAINING PROGRAM

## 2023-11-29 PROCEDURE — G0378 HOSPITAL OBSERVATION PER HR: HCPCS

## 2023-11-29 PROCEDURE — 85025 COMPLETE CBC W/AUTO DIFF WBC: CPT | Performed by: NURSE PRACTITIONER

## 2023-11-29 PROCEDURE — 99204 OFFICE O/P NEW MOD 45 MIN: CPT | Mod: ,,, | Performed by: STUDENT IN AN ORGANIZED HEALTH CARE EDUCATION/TRAINING PROGRAM

## 2023-11-29 PROCEDURE — 25000003 PHARM REV CODE 250: Performed by: NURSE PRACTITIONER

## 2023-11-29 PROCEDURE — 36415 COLL VENOUS BLD VENIPUNCTURE: CPT | Performed by: NURSE PRACTITIONER

## 2023-11-29 PROCEDURE — 80053 COMPREHEN METABOLIC PANEL: CPT | Performed by: NURSE PRACTITIONER

## 2023-11-29 PROCEDURE — 94761 N-INVAS EAR/PLS OXIMETRY MLT: CPT

## 2023-11-29 RX ADMIN — ATORVASTATIN CALCIUM 40 MG: 40 TABLET, FILM COATED ORAL at 08:11

## 2023-11-29 RX ADMIN — FAMOTIDINE 20 MG: 20 TABLET, FILM COATED ORAL at 08:11

## 2023-11-29 RX ADMIN — ASPIRIN 81 MG: 81 TABLET, COATED ORAL at 08:11

## 2023-11-29 NOTE — ASSESSMENT & PLAN NOTE
Left facial numbness and tingling, left arm numbness and tingling  Antithrombotics for secondary stroke prevention: Antiplatelets: Aspirin: 81 mg daily    Statins for secondary stroke prevention and hyperlipidemia, if present:   Statins: Atorvastatin- 40 mg daily    Aggressive risk factor modification: HLD     Rehab efforts: The patient has been evaluated by a stroke team provider and the therapy needs have been fully considered based off the presenting complaints and exam findings. The following therapy evaluations are needed: PT evaluate and treat, OT evaluate and treat, SLP evaluate and treat    Diagnostics ordered/pending: CTA Head to assess vasculature , CTA Neck/Arch to assess vasculature, HgbA1C to assess blood glucose levels, Lipid Profile to assess cholesterol levels, MRI head without contrast to assess brain parenchyma, TTE to assess cardiac function/status , TSH to assess thyroid function    VTE prophylaxis: Mechanical prophylaxis: Place SCDs    BP parameters: TIA: SBP <220 until imaging confirmation of no infarct     Need to rule out demyelinating disorder- further imaging pending

## 2023-11-29 NOTE — SUBJECTIVE & OBJECTIVE
Interval History:  Patient seen and examined.  Reports she feels almost back to baseline.  Discussed with Neurology, more MRIs ordered to rule out MS given nonspecific findings on MRI brain.  Patient agreeable    Review of Systems   Respiratory:  Negative for shortness of breath.    Cardiovascular:  Negative for chest pain and palpitations.     Objective:     Vital Signs (Most Recent):  Temp: 97.6 °F (36.4 °C) (11/29/23 0615)  Pulse: 73 (11/29/23 0615)  Resp: 16 (11/29/23 0615)  BP: 120/83 (11/29/23 0615)  SpO2: 97 % (11/29/23 0615) Vital Signs (24h Range):  Temp:  [97.6 °F (36.4 °C)-98 °F (36.7 °C)] 97.6 °F (36.4 °C)  Pulse:  [72-89] 73  Resp:  [16-20] 16  SpO2:  [95 %-98 %] 97 %  BP: (119-222)/(57-83) 120/83     Weight: 110.2 kg (243 lb)  Body mass index is 39.22 kg/m².    Intake/Output Summary (Last 24 hours) at 11/29/2023 0751  Last data filed at 11/29/2023 0643  Gross per 24 hour   Intake 1560 ml   Output --   Net 1560 ml         Physical Exam  Vitals and nursing note reviewed.   Constitutional:       General: She is not in acute distress.     Appearance: She is well-developed. She is not diaphoretic.   HENT:      Head: Normocephalic.      Mouth/Throat:      Mouth: Mucous membranes are moist.      Pharynx: Oropharynx is clear. No oropharyngeal exudate or posterior oropharyngeal erythema.   Eyes:      General: No scleral icterus.     Conjunctiva/sclera: Conjunctivae normal.      Pupils: Pupils are equal, round, and reactive to light.   Neck:      Vascular: No JVD.   Cardiovascular:      Rate and Rhythm: Normal rate and regular rhythm.      Heart sounds: Normal heart sounds. No murmur heard.     No friction rub. No gallop.   Pulmonary:      Effort: Pulmonary effort is normal. No respiratory distress.      Breath sounds: Normal breath sounds. No wheezing or rales.   Abdominal:      General: Bowel sounds are normal. There is no distension.      Palpations: Abdomen is soft.      Tenderness: There is no abdominal  tenderness. There is no guarding or rebound.   Musculoskeletal:         General: No tenderness. Normal range of motion.      Cervical back: Normal range of motion and neck supple.   Lymphadenopathy:      Cervical: No cervical adenopathy.   Skin:     General: Skin is warm and dry.      Capillary Refill: Capillary refill takes less than 2 seconds.      Coloration: Skin is not pale.      Findings: No erythema or rash.   Neurological:      Mental Status: She is alert and oriented to person, place, and time.      Cranial Nerves: No cranial nerve deficit.      Sensory: No sensory deficit.      Coordination: Coordination normal.      Deep Tendon Reflexes: Reflexes normal.   Psychiatric:         Behavior: Behavior normal.         Thought Content: Thought content normal.         Judgment: Judgment normal.             Significant Labs: All pertinent labs within the past 24 hours have been reviewed.    Significant Imaging: I have reviewed all pertinent imaging results/findings within the past 24 hours.

## 2023-11-29 NOTE — PLAN OF CARE
Pt appears to be resting, eyes are closed, breaths are deep and even. VSS, NAD noted at this time. Discussed PoC with pt and mother, who is in the room, stated they understood and would help as able. C/o pain handled w/PRN meds as ordered, will continue to monitor for duration of shift.

## 2023-11-29 NOTE — PLAN OF CARE
Patient not medically clear for DC from neuro standpoint. Per neuro, pt needs RAYRAY due to read on echo.        11/29/23 1346   Discharge Reassessment   Assessment Type Discharge Planning Reassessment   Did the patient's condition or plan change since previous assessment? No   Discharge Plan discussed with: Patient   Discharge Plan A Home with family   Discharge Plan B Home

## 2023-11-29 NOTE — CONSULTS
[facility]  Department of [facility]iology  Consult Note      PATIENT NAME: Uma Bangura    MRN: [id number]  TODAY'S DATE: 11/29/2023  ADMIT DATE: 11/27/2023                          CONSULT REQUESTED BY: Danae Arias MD    SUBJECTIVE     PRINCIPAL PROBLEM: TIA (transient ischemic attack)      REASON FOR CONSULT:  TIA work up - RAYRAY      HPI:  Patient is a 43-year-old female with no cardiac history who was admitted for TIA.  She presented to the hospital with left arm numbness/weakness and left facial tingling which improved later.  MRI brain and CT head have been unremarkable.  TTE was suggestive of right-to-left intracardiac shunt by agitated saline study.  Normal ventricular function, normal cardiac chamber sizes and no significant valvular abnormalities on echo.  Neurology requested a RAYRAY for further evaluation.  No history of Afib/flutter.  EKG shows sinus rhythm.  Telemetry monitoring so far shows sinus rhythm.  Discussed the risk, benefits and alternatives of RAYRAY with the patient.  She is agreeable to proceed.  She denies any history of radiation to the chest.  No history of any esophageal disorders.  Denies any dysphagia.     From neurology consult (11/28/2023):  Uma Bangura 43-year-old female who presents emergency room for evaluation of left arm numbness/weakness and left facial tingling.  The symptoms onset approximately 1:00 p.m. today.  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  She reports the symptoms did not resolve until several hours later when she came to the emergency room.  She denies any word-finding difficulty, dysarthria, slurred speech, or facial asymmetry.  She denies any recent illness or injury.  Denies fever or chills.  No aggravating alleviating factors.  Previous medical history includes GERD, anxiety, hyperlipidemia.  ER workup:  CBC and CMP were unremarkable.  Triglycerides elevated at 322.  CTA of the head and neck were negative for any  acute findings.  MRI is pending at the time my exam.  Patient be admitted to Hospital Medicine for treatment management.  Patient placed on CVA pathway.  Consult Neurology.      Neurology Consult Note: PAatient seen and examined, POC discussed with Dr Zuniga. She is alert and oriented x 3. She reports improvement in LA numbness but reports symptoms are still present. She denies weakness, vision deficit, speech difficulty, facial weakness, or headache. 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 She also reports she has history of tension HAs that starts with tightness of muscle in shoulders and neck radiates upward.    Review of patient's allergies indicates:   Allergen Reactions    Sertraline      Other reaction(s): heartburn       Past Medical History:   Diagnosis Date    Anxiety state, unspecified     Disturbance of salivary secretion     Edema     GERD (gastroesophageal reflux disease)     Goiter, unspecified     Nonspecific elevation of levels of transaminase or lactic acid dehydrogenase (LDH)     Polyphagia(783.6)     Unspecified sleep apnea     Varices of other sites      Past Surgical History:   Procedure Laterality Date     SECTION      x 2    ENDOMETRIAL ABLATION      OOPHORECTOMY Left     Dermoid cyst    TUBAL LIGATION       Social History     Tobacco Use    Smoking status: Never    Smokeless tobacco: Never   Substance Use Topics    Alcohol use: Yes     Comment: occasionally    Drug use: No        REVIEW OF SYSTEMS  CONSTITUTIONAL: Negative for chills, fatigue and fever.   EYES: No double vision, No blurred vision  NEURO: No headaches, No dizziness, +facial tingling, +left arm weakness  RESPIRATORY: Negative for cough, shortness of breath and wheezing.    CARDIOVASCULAR: Negative for chest pain. Negative for palpitations and leg swelling.   GI: Negative for abdominal pain, No melena, diarrhea, nausea and vomiting.   : Negative for dysuria and frequency,  Negative for hematuria  SKIN: Negative for bruising, Negative for edema or discoloration noted.   ENDOCRINE: Negative for polyphagia, Negative for heat intolerance, Negative for cold intolerance  PSYCHIATRIC: Negative for depression, Negative for anxiety, Negative for memory loss  MUSCULOSKELETAL: Negative for neck pain, Negative for muscle weakness, Negative for back pain     OBJECTIVE     VITAL SIGNS (Most Recent)  Temp: 97.1 °F (36.2 °C) (11/29/23 0915)  Pulse: 79 (11/29/23 0915)  Resp: 20 (11/29/23 0915)  BP: (!) 139/96 (11/29/23 0915)  SpO2: 97 % (11/29/23 0915)    VENTILATION STATUS  Resp: 20 (11/29/23 0915)  SpO2: 97 % (11/29/23 0915)           I & O (Last 24H):  Intake/Output Summary (Last 24 hours) at 11/29/2023 1222  Last data filed at 11/29/2023 0926  Gross per 24 hour   Intake 1560 ml   Output --   Net 1560 ml       WEIGHTS  Wt Readings from Last 1 Encounters:   11/28/23 0830 110.2 kg (243 lb)   11/27/23 2034 110.6 kg (243 lb 13.3 oz)   11/27/23 1558 112 kg (247 lb)       PHYSICAL EXAM    CONSTITUTIONAL:  in no apparent distress  NECK: no carotid bruit, no JVD  LUNGS: CTA  CHEST WALL: no tenderness  HEART: regular rate and rhythm, S1, S2 normal, no murmur, click, rub or gallop   ABDOMEN: soft, non-tender; bowel sounds normal; no masses,  no organomegaly  EXTREMITIES: Extremities normal, no edema  NEURO: AAO X 3    HOME MEDICATIONS:  No current facility-administered medications on file prior to encounter.     Current Outpatient Medications on File Prior to Encounter   Medication Sig Dispense Refill    ammonium lactate (LAC-HYDRIN) 12 % lotion Apply 1 g topically 2 (two) times daily as needed for Dry Skin.      clobetasoL (TEMOVATE) 0.05 % external solution Apply 1 mL topically 2 (two) times daily.      cycloSPORINE (RESTASIS) 0.05 % ophthalmic emulsion Place 1 drop into both eyes 2 (two) times daily.      ferrous sulfate 325 (65 FE) MG EC tablet TAKE ONE TABLET BY MOUTH EVERY DAY (Patient taking  "differently: Take 325 mg by mouth once daily.) 90 tablet 1    magnesium gluconate (MAG-G) 27 mg magnesium (500 mg) Tab tablet Take 27 mg by mouth once.      potassium citrate 99 mg Cap Take 1 capsule by mouth once daily.         SCHEDULED MEDS:   aspirin  81 mg Oral Daily    atorvastatin  40 mg Oral Daily    famotidine  20 mg Oral BID       CONTINUOUS INFUSIONS:   sodium chloride 0.9%         PRN MEDS:acetaminophen, carboxymethylcellulose sodium, ondansetron, sodium chloride 0.9%, sodium chloride 0.9%    LABS AND DIAGNOSTICS     CBC LAST 3 DAYS  Recent Labs   Lab 11/27/23  1616 11/28/23  0442 11/29/23  0501   WBC 10.54 8.06 7.94   RBC 4.81 4.62 4.81   HGB 13.9 13.4 13.9   HCT 42.2 40.4 42.2   MCV 88 87 88   MCH 28.9 29.0 28.9   MCHC 32.9 33.2 32.9   RDW 12.3 12.4 12.2    188 193   MPV 10.2 10.5 10.4   GRAN 52.1  5.5 45.9  3.7 40.2  3.2   LYMPH 41.2  4.3 45.5  3.7 50.0*  4.0   MONO 4.9  0.5 6.3  0.5 6.8  0.5   BASO 0.05 0.04 0.06   NRBC 0 0 0       COAGULATION LAST 3 DAYS  Recent Labs   Lab 11/27/23  1616 11/27/23  1623 11/28/23  0442   INR 0.9 1.0 0.9   APTT  --   --  26.7       CHEMISTRY LAST 3 DAYS  Recent Labs   Lab 11/27/23  1616 11/28/23  0442 11/29/23  0501    140 141   K 4.1 4.0 4.3    104 105   CO2 26 25 27   ANIONGAP 12 11 9   BUN 18 16 18   CREATININE 0.9 1.0 1.1   GLU 87 98 99   CALCIUM 9.8 9.0 9.3   MG  --  2.0  --    ALBUMIN 4.1 3.6 3.6   PROT 7.5 6.5 6.8   ALKPHOS 71 63 62   ALT 30 29 31   AST 27 25 24   BILITOT 0.5 0.5 0.5       CARDIAC PROFILE LAST 3 DAYS  No results for input(s): "BNP", "CPK", "CPKMB", "LDH", "TROPONINI", "TROPONINIHS" in the last 168 hours.    ENDOCRINE LAST 3 DAYS  Recent Labs   Lab 11/27/23  1616   TSH 1.496       LAST ARTERIAL BLOOD GAS  ABG  No results for input(s): "PH", "PO2", "PCO2", "HCO3", "BE" in the last 168 hours.    LAST 7 DAYS MICROBIOLOGY   Microbiology Results (last 7 days)       ** No results found for the last 168 hours. **      "       MOST RECENT IMAGING  US Lower Extremity Veins Bilateral  Narrative: EXAMINATION:  US LOWER EXTREMITY VEINS BILATERAL    CLINICAL HISTORY:  TIA;    TECHNIQUE:  Duplex and color flow Doppler and dynamic compression was performed of the bilateral lower extremity veins was performed.    COMPARISON:  None    FINDINGS:  Right thigh veins: The common femoral, femoral, popliteal, upper greater saphenous, and deep femoral veins are patent and free of thrombus. The veins are normally compressible and have normal phasic flow and augmentation response.    Right calf veins: The visualized calf veins are patent.    Left thigh veins: The common femoral, femoral, popliteal, upper greater saphenous, and deep femoral veins are patent and free of thrombus. The veins are normally compressible and have normal phasic flow and augmentation response.    Left calf veins: The visualized calf veins are patent.    Miscellaneous: None  Impression: No evidence of deep venous thrombosis in either lower extremity.    Electronically signed by: Braxton Carroll MD  Date:    11/29/2023  Time:    11:34  MRI Brain Demyelinating W W/O Contrast  Narrative: EXAMINATION:  MRI BRAIN DEMYELINATING W/ WO CONTRAST    CLINICAL HISTORY:  Demyelinating disease;Rule out MS;.    TECHNIQUE:  Multiplanar multisequence MR imaging of the brain was performed before and after the administration of 10 mL Gadavist intravenous contrast.  Diffusion-weighted imaging was performed.  ADC map was generated.    COMPARISON:  Unenhanced brain MRI dated 11/27/2023    FINDINGS:  Intracranial compartment:    There is no acute abnormality or change in the appearance of the brain compared to yesterday study.  There is stable minimal nonspecific white matter change without associated hemorrhage or enhancement.  There is no acute infarction.  There is no hydrocephalus or midline shift  Impression: There is no acute abnormality or change in the appearance of the brain compared to  yesterday study.  There is stable minimal nonspecific white matter change.  There is no abnormal enhancement.  There is no hemorrhage, mass or acute infarction.    Electronically signed by: Navneet Payne MD  Date:    11/29/2023  Time:    09:08  MRI Cervical Spine Demyelinating W W/O Contrast  Narrative: EXAMINATION:  MRI CERVICAL SPINE DEMYELINATING W W/O CONTRAST    CLINICAL HISTORY:  rule out MS;    TECHNIQUE:  Multiplanar multisequence pre and post contrast enhanced cervical spine MRI was performed.10 ml Gadavist were administered intravenously without reported reaction.    COMPARISON:  None.    FINDINGS:  Vertebral column: There is degenerative change which will be described by level.  The study is mildly motion degraded.  There is straightening of the cervical spine with loss of normal lordosis.  This could be positional.  The vertebral bodies maintain normal height and alignment.  There is no significant disc space narrowing.  Baseline marrow signal is normal.  The odontoid process is intact.  There is no abnormal enhancement within the vertebral bodies or discs.    Spinal canal, cord, epidural space: The spinal canal may be borderline small on a developmental basis.  The cord is grossly normal in signal intensity without abnormal enhancement.  It should be noted that patient motion does limit evaluation of cord signal.    Findings by level:    C2-3: There is a minimal disc bulge and mild left facet joint arthropathy.  There is no spinal canal or significant foraminal stenosis.    C3-4: There is mild bilateral uncovertebral spurring with a mild disc osteophyte complex eccentric to the right resulting in mild spinal stenosis and moderate bilateral foraminal stenosis.  There is flattening of the ventral cord surface.    C4-5: There is a tiny central disc protrusion.  There is mild bilateral uncovertebral spurring and facet joint arthropathy.  There is borderline spinal stenosis with at least mild bilateral  foraminal stenosis.    C5-6: There is a broad left paracentral disc protrusion with annular fissure which flattens the left ventral cord surface.  There is mild uncovertebral spurring.  There is at least mild spinal stenosis and left foraminal stenosis.    C6-7: There is a broad right paracentral disc protrusion with annular fissure which flattens the right ventral cord surface and results in severe right lateral recess stenosis, mild spinal stenosis.  There is mild left foraminal stenosis due to uncovertebral spurring and facet joint arthropathy.    C7-T1: There is no spinal canal or foraminal stenosis.    Soft tissues, other: The prevertebral soft tissues are grossly normal.  The airway is patent.  Impression: 1. There is multilevel degenerative change discussed in detail by level above.  There is no fracture or malalignment.  The study is motion degraded which somewhat limits evaluation of cord surface but no definite cord signal abnormality is identified on the axial images.  Motion limits evaluation on the STIR sagittal images.  2. There is a broad right-sided disc protrusion at the C3-4 level.  There is a larger left paracentral disc protrusion at C5-6 and a right paracentral disc protrusion at C6-7 levels.  There is flattening of the cord surface at these levels but again there is no acute cord edema.  There is no myelomalacia in there is no definite demyelination.  Please see above discussion.    Electronically signed by: Navneet Payne MD  Date:    11/29/2023  Time:    09:00  MRI Thoracic Spine Demyelinating W W/O Contrast  Narrative: EXAMINATION:  MRI THORACIC SPINE DEMYELINATING W W/O CONTRAST    CLINICAL HISTORY:  rule out MS;    TECHNIQUE:  Multiplanar, multisequence images were performed through the thoracic spine prior to and after the uneventful intravenous administration of 10 mL Gadavist.    COMPARISON:  None    FINDINGS:  Vertebral column:    The study is mildly motion degraded.  The thoracic  vertebral bodies maintain normal height and alignment.  There is no significant disc space narrowing.  There is very mild multilevel endplate osteophyte formation.  Baseline marrow signal is normal.  There is no abnormal enhancement within the vertebral bodies or discs.    Spinal canal, cord, epidural space:    The spinal canal is developmentally normal.  The cord is normal in caliber, contour and signal intensity.  There is no abnormal enhancement.    Findings by level:    There is a minimal disc bulge at the T7-8 level.  There is no significant disc bulge or focal disc extrusion at any level.  There is no spinal canal or significant foraminal stenosis.  There is no acute cord or suspected nerve root compression.    Soft tissues, other: The prevertebral soft tissues are grossly normal.  The kidneys are normal without hydronephrosis.  Impression: 1. There is no acute or significant abnormality.  There is no fracture or malalignment.  There is no spinal canal or foraminal stenosis.  There is no cord or nerve root compression.  There is no abnormal enhancement within the vertebral bodies, discs or cord.  There is a minimal disc bulge at the T7-8 level.    Electronically signed by: Navneet Payne MD  Date:    11/29/2023  Time:    07:57      ECHOCARDIOGRAM RESULTS (last 5)  Results for orders placed during the hospital encounter of 11/27/23    Echo Saline Bubble? Yes    Interpretation Summary    There was 20 mL of intravenous agitated saline (bubble) used. Study is positive for intracardiac shunt that is right to left. Overall the study quality was adequate    Left Ventricle: The left ventricle is normal in size. Normal wall thickness. Normal wall motion. There is normal systolic function with a visually estimated ejection fraction of 55 - 60%. There is normal diastolic function.    Right Ventricle: Normal right ventricular cavity size. Wall thickness is normal. Right ventricle wall motion  is normal. Systolic  function is normal.    Tricuspid Valve: There is mild regurgitation.    IVC/SVC: Normal venous pressure at 3 mmHg.      CURRENT/PREVIOUS VISIT EKG  No results found for this or any previous visit.        ASSESSMENT/PLAN:     Active Hospital Problems    Diagnosis    *TIA (transient ischemic attack)       ASSESSMENT & PLAN:   TIA    ASA and Lipitor as per neurology.   Plan for RAYRAY tomorrow for further evaluation. Discussed the risk, benefits and alternatives of RAYRAY with the patient.  She is agreeable to proceed.           Thuy Jane MD  Date of Service: 11/29/2023  12:22 PM

## 2023-11-29 NOTE — SUBJECTIVE & OBJECTIVE
Interval History:  Patient seen and examined.  Complaints.  MRIs not consistent with demyelinating disease.  Discussed with Dr. Rober España.  Recommends aspirin and statin on discharge.  She also recommends DVT study as well as RAYRAY to rule out intracardiac thrombus secondary to positive bubble study on TTE.  I discussed with Cardiology.    Review of Systems   Respiratory:  Negative for shortness of breath.    Cardiovascular:  Negative for chest pain and palpitations.     Objective:     Vital Signs (Most Recent):  Temp: 97.1 °F (36.2 °C) (11/29/23 0915)  Pulse: 79 (11/29/23 0915)  Resp: 20 (11/29/23 0915)  BP: (!) 139/96 (11/29/23 0915)  SpO2: 97 % (11/29/23 0915) Vital Signs (24h Range):  Temp:  [97.1 °F (36.2 °C)-97.9 °F (36.6 °C)] 97.1 °F (36.2 °C)  Pulse:  [72-89] 79  Resp:  [16-20] 20  SpO2:  [95 %-98 %] 97 %  BP: (119-150)/(65-96) 139/96     Weight: 110.2 kg (243 lb)  Body mass index is 39.22 kg/m².    Intake/Output Summary (Last 24 hours) at 11/29/2023 1051  Last data filed at 11/29/2023 0926  Gross per 24 hour   Intake 1560 ml   Output --   Net 1560 ml           Physical Exam  Vitals and nursing note reviewed.   Constitutional:       General: She is not in acute distress.     Appearance: She is well-developed. She is not diaphoretic.   HENT:      Head: Normocephalic.      Mouth/Throat:      Mouth: Mucous membranes are moist.      Pharynx: Oropharynx is clear. No oropharyngeal exudate or posterior oropharyngeal erythema.   Eyes:      General: No scleral icterus.     Conjunctiva/sclera: Conjunctivae normal.      Pupils: Pupils are equal, round, and reactive to light.   Neck:      Vascular: No JVD.   Cardiovascular:      Rate and Rhythm: Normal rate and regular rhythm.      Heart sounds: Normal heart sounds. No murmur heard.     No friction rub. No gallop.   Pulmonary:      Effort: Pulmonary effort is normal. No respiratory distress.      Breath sounds: Normal breath sounds. No wheezing or rales.   Abdominal:       General: Bowel sounds are normal. There is no distension.      Palpations: Abdomen is soft.      Tenderness: There is no abdominal tenderness. There is no guarding or rebound.   Musculoskeletal:         General: No tenderness. Normal range of motion.      Cervical back: Normal range of motion and neck supple.   Lymphadenopathy:      Cervical: No cervical adenopathy.   Skin:     General: Skin is warm and dry.      Capillary Refill: Capillary refill takes less than 2 seconds.      Coloration: Skin is not pale.      Findings: No erythema or rash.   Neurological:      Mental Status: She is alert and oriented to person, place, and time.      Cranial Nerves: No cranial nerve deficit.      Sensory: No sensory deficit.      Coordination: Coordination normal.      Deep Tendon Reflexes: Reflexes normal.   Psychiatric:         Behavior: Behavior normal.         Thought Content: Thought content normal.         Judgment: Judgment normal.             Significant Labs: All pertinent labs within the past 24 hours have been reviewed.    Significant Imaging: I have reviewed all pertinent imaging results/findings within the past 24 hours.

## 2023-11-29 NOTE — PROGRESS NOTES
[facility] Medicine  Progress Note    Patient Name: Uma Bangura  MRN: [id number]  Patient Class: OP- Observation   Admission Date: 11/27/2023  Length of Stay: 0 days  Attending Physician: Danae Arias MD  Primary Care Provider: Marine Pillai MD        Subjective:     Principal Problem:TIA (transient ischemic attack)        HPI:  Uma Bangura 43-year-old female who presents emergency room for evaluation of left arm numbness/weakness and left facial tingling.  The symptoms onset approximately 1:00 p.m. today.  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  She reports the symptoms did not resolve until several hours later when she came to the emergency room.  She denies any word-finding difficulty, dysarthria, slurred speech, or facial asymmetry.  She denies any recent illness or injury.  Denies fever or chills.  No aggravating alleviating factors.  Previous medical history includes GERD, anxiety, hyperlipidemia.  ER workup:  CBC and CMP were unremarkable.  Triglycerides elevated at 322.  CTA of the head and neck were negative for any acute findings.  MRI is pending at the time my exam.  Patient be admitted to Hospital Medicine for treatment management.  Patient placed on CVA pathway.  Consult Neurology.    Overview/Hospital Course:  No notes on file    Interval History:  Patient seen and examined.  Complaints.  MRIs not consistent with demyelinating disease.  Discussed with Dr. Rober España.  Recommends aspirin and statin on discharge.  She also recommends DVT study as well as RAYRAY to rule out intracardiac thrombus secondary to positive bubble study on TTE.  I discussed with Cardiology.    Review of Systems   Respiratory:  Negative for shortness of breath.    Cardiovascular:  Negative for chest pain and palpitations.     Objective:     Vital Signs (Most Recent):  Temp: 97.1 °F (36.2 °C) (11/29/23 0915)  Pulse: 79 (11/29/23 0915)  Resp: 20 (11/29/23 0915)  BP:  (!) 139/96 (11/29/23 0915)  SpO2: 97 % (11/29/23 0915) Vital Signs (24h Range):  Temp:  [97.1 °F (36.2 °C)-97.9 °F (36.6 °C)] 97.1 °F (36.2 °C)  Pulse:  [72-89] 79  Resp:  [16-20] 20  SpO2:  [95 %-98 %] 97 %  BP: (119-150)/(65-96) 139/96     Weight: 110.2 kg (243 lb)  Body mass index is 39.22 kg/m².    Intake/Output Summary (Last 24 hours) at 11/29/2023 1051  Last data filed at 11/29/2023 0926  Gross per 24 hour   Intake 1560 ml   Output --   Net 1560 ml           Physical Exam  Vitals and nursing note reviewed.   Constitutional:       General: She is not in acute distress.     Appearance: She is well-developed. She is not diaphoretic.   HENT:      Head: Normocephalic.      Mouth/Throat:      Mouth: Mucous membranes are moist.      Pharynx: Oropharynx is clear. No oropharyngeal exudate or posterior oropharyngeal erythema.   Eyes:      General: No scleral icterus.     Conjunctiva/sclera: Conjunctivae normal.      Pupils: Pupils are equal, round, and reactive to light.   Neck:      Vascular: No JVD.   Cardiovascular:      Rate and Rhythm: Normal rate and regular rhythm.      Heart sounds: Normal heart sounds. No murmur heard.     No friction rub. No gallop.   Pulmonary:      Effort: Pulmonary effort is normal. No respiratory distress.      Breath sounds: Normal breath sounds. No wheezing or rales.   Abdominal:      General: Bowel sounds are normal. There is no distension.      Palpations: Abdomen is soft.      Tenderness: There is no abdominal tenderness. There is no guarding or rebound.   Musculoskeletal:         General: No tenderness. Normal range of motion.      Cervical back: Normal range of motion and neck supple.   Lymphadenopathy:      Cervical: No cervical adenopathy.   Skin:     General: Skin is warm and dry.      Capillary Refill: Capillary refill takes less than 2 seconds.      Coloration: Skin is not pale.      Findings: No erythema or rash.   Neurological:      Mental Status: She is alert and oriented to  person, place, and time.      Cranial Nerves: No cranial nerve deficit.      Sensory: No sensory deficit.      Coordination: Coordination normal.      Deep Tendon Reflexes: Reflexes normal.   Psychiatric:         Behavior: Behavior normal.         Thought Content: Thought content normal.         Judgment: Judgment normal.             Significant Labs: All pertinent labs within the past 24 hours have been reviewed.    Significant Imaging: I have reviewed all pertinent imaging results/findings within the past 24 hours.    Assessment/Plan:      * TIA (transient ischemic attack)  Left facial numbness and tingling, left arm numbness and tingling  Antithrombotics for secondary stroke prevention: Antiplatelets: Aspirin: 81 mg daily    Statins for secondary stroke prevention and hyperlipidemia, if present:   Statins: Atorvastatin- 40 mg daily    Aggressive risk factor modification: HLD     Rehab efforts: The patient has been evaluated by a stroke team provider and the therapy needs have been fully considered based off the presenting complaints and exam findings. The following therapy evaluations are needed: PT evaluate and treat, OT evaluate and treat, SLP evaluate and treat    Diagnostics ordered/pending: CTA Head to assess vasculature , CTA Neck/Arch to assess vasculature, HgbA1C to assess blood glucose levels, Lipid Profile to assess cholesterol levels, MRI head without contrast to assess brain parenchyma, TTE to assess cardiac function/status , TSH to assess thyroid function    VTE prophylaxis: Mechanical prophylaxis: Place SCDs    BP parameters: TIA: SBP <220 until imaging confirmation of no infarct     Further imaging has been done and was not consistent with demyelinating disorder    Positive bubble study on TTE, chad and lower extremity DVT studies ordered.          VTE Risk Mitigation (From admission, onward)           Ordered     IP VTE HIGH RISK PATIENT  Once         11/27/23 2933     Place sequential compression  device  Until discontinued         11/27/23 1843     Place PIYUSH hose  Until discontinued         11/27/23 1843                    Discharge Planning   SARA: 11/28/2023     Code Status: Full Code   Is the patient medically ready for discharge?:     Reason for patient still in hospital (select all that apply): Imaging and Consult recommendations  Discharge Plan A: Home with family                  Danae Arias MD  Department of Hospital Medicine   [facility]/Surg

## 2023-11-29 NOTE — ASSESSMENT & PLAN NOTE
Left facial numbness and tingling, left arm numbness and tingling  Antithrombotics for secondary stroke prevention: Antiplatelets: Aspirin: 81 mg daily    Statins for secondary stroke prevention and hyperlipidemia, if present:   Statins: Atorvastatin- 40 mg daily    Aggressive risk factor modification: HLD     Rehab efforts: The patient has been evaluated by a stroke team provider and the therapy needs have been fully considered based off the presenting complaints and exam findings. The following therapy evaluations are needed: PT evaluate and treat, OT evaluate and treat, SLP evaluate and treat    Diagnostics ordered/pending: CTA Head to assess vasculature , CTA Neck/Arch to assess vasculature, HgbA1C to assess blood glucose levels, Lipid Profile to assess cholesterol levels, MRI head without contrast to assess brain parenchyma, TTE to assess cardiac function/status , TSH to assess thyroid function    VTE prophylaxis: Mechanical prophylaxis: Place SCDs    BP parameters: TIA: SBP <220 until imaging confirmation of no infarct     Further imaging has been done and was not consistent with demyelinating disorder    Positive bubble study on TTE, chad and lower extremity DVT studies ordered.

## 2023-11-30 ENCOUNTER — ANESTHESIA (OUTPATIENT)
Dept: CARDIOLOGY | Facility: HOSPITAL | Age: 43
End: 2023-11-30
Payer: COMMERCIAL

## 2023-11-30 ENCOUNTER — ANESTHESIA EVENT (OUTPATIENT)
Dept: CARDIOLOGY | Facility: HOSPITAL | Age: 43
End: 2023-11-30
Payer: COMMERCIAL

## 2023-11-30 VITALS
SYSTOLIC BLOOD PRESSURE: 110 MMHG | TEMPERATURE: 99 F | HEART RATE: 68 BPM | BODY MASS INDEX: 39.05 KG/M2 | WEIGHT: 243 LBS | RESPIRATION RATE: 16 BRPM | OXYGEN SATURATION: 97 % | DIASTOLIC BLOOD PRESSURE: 67 MMHG | HEIGHT: 66 IN

## 2023-11-30 PROCEDURE — 37000009 HC ANESTHESIA EA ADD 15 MINS: Performed by: STUDENT IN AN ORGANIZED HEALTH CARE EDUCATION/TRAINING PROGRAM

## 2023-11-30 PROCEDURE — D9220A PRA ANESTHESIA: Mod: ANES,,, | Performed by: ANESTHESIOLOGY

## 2023-11-30 PROCEDURE — 37000008 HC ANESTHESIA 1ST 15 MINUTES: Performed by: STUDENT IN AN ORGANIZED HEALTH CARE EDUCATION/TRAINING PROGRAM

## 2023-11-30 PROCEDURE — D9220A PRA ANESTHESIA: ICD-10-PCS | Mod: ANES,,, | Performed by: ANESTHESIOLOGY

## 2023-11-30 PROCEDURE — 25000003 PHARM REV CODE 250: Performed by: NURSE PRACTITIONER

## 2023-11-30 PROCEDURE — 25000003 PHARM REV CODE 250: Performed by: NURSE ANESTHETIST, CERTIFIED REGISTERED

## 2023-11-30 PROCEDURE — G0378 HOSPITAL OBSERVATION PER HR: HCPCS

## 2023-11-30 PROCEDURE — D9220A PRA ANESTHESIA: ICD-10-PCS | Mod: CRNA,,, | Performed by: NURSE ANESTHETIST, CERTIFIED REGISTERED

## 2023-11-30 PROCEDURE — 94760 N-INVAS EAR/PLS OXIMETRY 1: CPT

## 2023-11-30 PROCEDURE — D9220A PRA ANESTHESIA: Mod: CRNA,,, | Performed by: NURSE ANESTHETIST, CERTIFIED REGISTERED

## 2023-11-30 PROCEDURE — 99214 OFFICE O/P EST MOD 30 MIN: CPT | Mod: ,,, | Performed by: STUDENT IN AN ORGANIZED HEALTH CARE EDUCATION/TRAINING PROGRAM

## 2023-11-30 PROCEDURE — 99214 PR OFFICE/OUTPT VISIT, EST, LEVL IV, 30-39 MIN: ICD-10-PCS | Mod: ,,, | Performed by: STUDENT IN AN ORGANIZED HEALTH CARE EDUCATION/TRAINING PROGRAM

## 2023-11-30 PROCEDURE — 63600175 PHARM REV CODE 636 W HCPCS: Performed by: NURSE ANESTHETIST, CERTIFIED REGISTERED

## 2023-11-30 RX ORDER — PROPOFOL 10 MG/ML
VIAL (ML) INTRAVENOUS
Status: DISCONTINUED | OUTPATIENT
Start: 2023-11-30 | End: 2023-11-30

## 2023-11-30 RX ORDER — LIDOCAINE HYDROCHLORIDE 20 MG/ML
INJECTION INTRAVENOUS
Status: DISCONTINUED | OUTPATIENT
Start: 2023-11-30 | End: 2023-11-30

## 2023-11-30 RX ADMIN — PROPOFOL 50 MG: 10 INJECTION, EMULSION INTRAVENOUS at 11:11

## 2023-11-30 RX ADMIN — PROPOFOL 100 MG: 10 INJECTION, EMULSION INTRAVENOUS at 11:11

## 2023-11-30 RX ADMIN — LIDOCAINE HYDROCHLORIDE 100 MG: 20 INJECTION, SOLUTION INTRAVENOUS at 11:11

## 2023-11-30 RX ADMIN — SODIUM CHLORIDE: 0.9 INJECTION, SOLUTION INTRAVENOUS at 10:11

## 2023-11-30 NOTE — ANESTHESIA POSTPROCEDURE EVALUATION
Anesthesia Post Evaluation    Patient: Uma Bangura    Procedure(s) Performed: Procedure(s) (LRB):  Transesophageal echo (RAYRAY) intra-procedure log documentation (N/A)    Final Anesthesia Type: MAC      Patient location during evaluation: PACU  Patient participation: Yes- Able to Participate  Level of consciousness: awake and alert  Post-procedure vital signs: reviewed and stable  Pain management: adequate  Airway patency: patent    PONV status at discharge: No PONV  Anesthetic complications: no      Cardiovascular status: blood pressure returned to baseline  Respiratory status: unassisted  Hydration status: euvolemic  Follow-up not needed.              Vitals Value Taken Time   /58 11/30/23 0800   Temp 36.6 °C (97.9 °F) 11/30/23 0800   Pulse 75 11/30/23 0818   Resp 18 11/30/23 0818   SpO2 95 % 11/30/23 0818         No case tracking events are documented in the log.      Pain/Travis Score: No data recorded

## 2023-11-30 NOTE — CARE UPDATE
11/27/23 2057   Patient Assessment/Suction   Level of Consciousness (AVPU) alert   Respiratory Effort Normal;Unlabored   Expansion/Accessory Muscles/Retractions no use of accessory muscles;expansion symmetric   Rhythm/Pattern, Respiratory unlabored;pattern regular;depth regular   Cough Frequency no cough   PRE-TX-O2   Device (Oxygen Therapy) room air   SpO2 99 %   Pulse 78   Resp 18       
   11/29/23 2007   Patient Assessment/Suction   Level of Consciousness (AVPU) alert   Respiratory Effort Normal;Unlabored   PRE-TX-O2   Device (Oxygen Therapy) room air   SpO2 96 %   Pulse Oximetry Type Intermittent   Pulse 82   Resp 18       
PAST MEDICAL HISTORY:  No pertinent past medical history

## 2023-11-30 NOTE — PLAN OF CARE
Pt clear for DC from case management standpoint. Discharging to home    DC pending RAYRAY results and neuro clearance       11/30/23 1215   Final Note   Assessment Type Final Discharge Note   Anticipated Discharge Disposition Home

## 2023-11-30 NOTE — NURSING
IV and tele removed. Orders reviewed. Prescriptions sent to pharmacy. Pt ambulated to [facility] . Pt d/c home.

## 2023-11-30 NOTE — PLAN OF CARE
Problem: Cognitive Impairment (Stroke, Ischemic/Transient Ischemic Attack)  Goal: Optimal Cognitive Function  Outcome: Ongoing, Progressing     Problem: Functional Ability Impaired (Stroke, Ischemic/Transient Ischemic Attack)  Goal: Optimal Functional Ability  Outcome: Ongoing, Progressing     Problem: Adult Inpatient Plan of Care  Goal: Patient-Specific Goal (Individualized)  Outcome: Ongoing, Progressing

## 2023-11-30 NOTE — PROGRESS NOTES
[facility]  Department of Cardiology  Consult Note      PATIENT NAME: Uma Bangura    MRN: [id number]  TODAY'S DATE: 11/30/2023  ADMIT DATE: 11/27/2023                          CONSULT REQUESTED BY: Danae Arias MD    SUBJECTIVE     INTERVAL HISTORY:  11/30/2023  No symptoms. S/p RAYRAY today. Tolerated the procedure well. There is a small PFO with small left to right shunt by color flow doppler and small right to left shunt by agitated saline study with a small amount bubbles crossing to the left. No evidence of intracardiac thrombus.  B/L LE venous doppler negative for DVT.      REASON FOR CONSULT:  TIA work up - RAYRAY      HPI:  Patient is a 43-year-old female with no cardiac history who was admitted for TIA.  She presented to the hospital with left arm numbness/weakness and left facial tingling which improved later.  MRI brain and CT head have been unremarkable.  TTE was suggestive of right-to-left intracardiac shunt by agitated saline study.  Normal ventricular function, normal cardiac chamber sizes and no significant valvular abnormalities on echo.  Neurology requested a RAYRAY for further evaluation.  No history of Afib/flutter.  EKG shows sinus rhythm.  Telemetry monitoring so far shows sinus rhythm.  Discussed the risk, benefits and alternatives of RAYRAY with the patient.  She is agreeable to proceed.  She denies any history of radiation to the chest.  No history of any esophageal disorders.  Denies any dysphagia.     From neurology consult (11/28/2023):  Uma Bangura 43-year-old female who presents emergency room for evaluation of left arm numbness/weakness and left facial tingling.  The symptoms onset approximately 1:00 p.m. today.  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  She reports the symptoms did not resolve until several hours later when she came to the emergency room.  She denies any word-finding difficulty, dysarthria, slurred speech, or facial asymmetry.   She denies any recent illness or injury.  Denies fever or chills.  No aggravating alleviating factors.  Previous medical history includes GERD, anxiety, hyperlipidemia.  ER workup:  CBC and CMP were unremarkable.  Triglycerides elevated at 322.  CTA of the head and neck were negative for any acute findings.  MRI is pending at the time my exam.  Patient be admitted to Hospital Medicine for treatment management.  Patient placed on CVA pathway.  Consult Neurology.      Neurology Consult Note: PAatient seen and examined, POC discussed with Dr Zuniga. She is alert and oriented x 3. She reports improvement in LA numbness but reports symptoms are still present. She denies weakness, vision deficit, speech difficulty, facial weakness, or headache. 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 She also reports she has history of tension HAs that starts with tightness of muscle in shoulders and neck radiates upward.    Review of patient's allergies indicates:   Allergen Reactions    Sertraline      Other reaction(s): heartburn       Past Medical History:   Diagnosis Date    Anxiety state, unspecified     Disturbance of salivary secretion     Edema     GERD (gastroesophageal reflux disease)     Goiter, unspecified     Nonspecific elevation of levels of transaminase or lactic acid dehydrogenase (LDH)     Polyphagia(783.6)     Unspecified sleep apnea     Varices of other sites      Past Surgical History:   Procedure Laterality Date     SECTION      x 2    ENDOMETRIAL ABLATION      OOPHORECTOMY Left     Dermoid cyst    TUBAL LIGATION       Social History     Tobacco Use    Smoking status: Never    Smokeless tobacco: Never   Substance Use Topics    Alcohol use: Yes     Comment: occasionally    Drug use: No        REVIEW OF SYSTEMS  CONSTITUTIONAL: Negative for chills, fatigue and fever.   EYES: No double vision, No blurred vision  NEURO: No headaches, No dizziness, +facial tingling,  +left arm weakness  RESPIRATORY: Negative for cough, shortness of breath and wheezing.    CARDIOVASCULAR: Negative for chest pain. Negative for palpitations and leg swelling.   GI: Negative for abdominal pain, No melena, diarrhea, nausea and vomiting.   : Negative for dysuria and frequency, Negative for hematuria  SKIN: Negative for bruising, Negative for edema or discoloration noted.   ENDOCRINE: Negative for polyphagia, Negative for heat intolerance, Negative for cold intolerance  PSYCHIATRIC: Negative for depression, Negative for anxiety, Negative for memory loss  MUSCULOSKELETAL: Negative for neck pain, Negative for muscle weakness, Negative for back pain     OBJECTIVE     VITAL SIGNS (Most Recent)  Temp:  (in surgery) (11/30/23 1100)  Pulse: 75 (11/30/23 0818)  Resp: 18 (11/30/23 0818)  BP: (!) 107/58 (11/30/23 0800)  SpO2: 95 % (11/30/23 0818)    VENTILATION STATUS  Resp: 18 (11/30/23 0818)  SpO2: 95 % (11/30/23 0818)           I & O (Last 24H):  Intake/Output Summary (Last 24 hours) at 11/30/2023 1234  Last data filed at 11/30/2023 1231  Gross per 24 hour   Intake 1420 ml   Output --   Net 1420 ml       WEIGHTS  Wt Readings from Last 1 Encounters:   11/30/23 1100 110.2 kg (243 lb)   11/28/23 0830 110.2 kg (243 lb)   11/27/23 2034 110.6 kg (243 lb 13.3 oz)   11/27/23 1558 112 kg (247 lb)       PHYSICAL EXAM    CONSTITUTIONAL:  in no apparent distress  NECK: no carotid bruit, no JVD  LUNGS: CTA  CHEST WALL: no tenderness  HEART: regular rate and rhythm, S1, S2 normal, no murmur, click, rub or gallop   ABDOMEN: soft, non-tender; bowel sounds normal; no masses,  no organomegaly  EXTREMITIES: Extremities normal, no edema  NEURO: AAO X 3    HOME MEDICATIONS:  No current facility-administered medications on file prior to encounter.     Current Outpatient Medications on File Prior to Encounter   Medication Sig Dispense Refill    ammonium lactate (LAC-HYDRIN) 12 % lotion Apply 1 g topically 2 (two) times daily as  "needed for Dry Skin.      clobetasoL (TEMOVATE) 0.05 % external solution Apply 1 mL topically 2 (two) times daily.      cycloSPORINE (RESTASIS) 0.05 % ophthalmic emulsion Place 1 drop into both eyes 2 (two) times daily.      ferrous sulfate 325 (65 FE) MG EC tablet TAKE ONE TABLET BY MOUTH EVERY DAY (Patient taking differently: Take 325 mg by mouth once daily.) 90 tablet 1    magnesium gluconate (MAG-G) 27 mg magnesium (500 mg) Tab tablet Take 27 mg by mouth once.      potassium citrate 99 mg Cap Take 1 capsule by mouth once daily.         SCHEDULED MEDS:   aspirin  81 mg Oral Daily    atorvastatin  40 mg Oral Daily    famotidine  20 mg Oral BID       CONTINUOUS INFUSIONS:        PRN MEDS:acetaminophen, carboxymethylcellulose sodium, ondansetron, sodium chloride 0.9%    LABS AND DIAGNOSTICS     CBC LAST 3 DAYS  Recent Labs   Lab 11/27/23 1616 11/28/23 0442 11/29/23  0501   WBC 10.54 8.06 7.94   RBC 4.81 4.62 4.81   HGB 13.9 13.4 13.9   HCT 42.2 40.4 42.2   MCV 88 87 88   MCH 28.9 29.0 28.9   MCHC 32.9 33.2 32.9   RDW 12.3 12.4 12.2    188 193   MPV 10.2 10.5 10.4   GRAN 52.1  5.5 45.9  3.7 40.2  3.2   LYMPH 41.2  4.3 45.5  3.7 50.0*  4.0   MONO 4.9  0.5 6.3  0.5 6.8  0.5   BASO 0.05 0.04 0.06   NRBC 0 0 0       COAGULATION LAST 3 DAYS  Recent Labs   Lab 11/27/23  1616 11/27/23  1623 11/28/23  0442   INR 0.9 1.0 0.9   APTT  --   --  26.7       CHEMISTRY LAST 3 DAYS  Recent Labs   Lab 11/27/23  1616 11/28/23  0442 11/29/23  0501    140 141   K 4.1 4.0 4.3    104 105   CO2 26 25 27   ANIONGAP 12 11 9   BUN 18 16 18   CREATININE 0.9 1.0 1.1   GLU 87 98 99   CALCIUM 9.8 9.0 9.3   MG  --  2.0  --    ALBUMIN 4.1 3.6 3.6   PROT 7.5 6.5 6.8   ALKPHOS 71 63 62   ALT 30 29 31   AST 27 25 24   BILITOT 0.5 0.5 0.5       CARDIAC PROFILE LAST 3 DAYS  No results for input(s): "BNP", "CPK", "CPKMB", "LDH", "TROPONINI", "TROPONINIHS" in the last 168 hours.    ENDOCRINE LAST 3 DAYS  Recent Labs   Lab " "11/27/23  1616   TSH 1.496       LAST ARTERIAL BLOOD GAS  ABG  No results for input(s): "PH", "PO2", "PCO2", "HCO3", "BE" in the last 168 hours.    LAST 7 DAYS MICROBIOLOGY   Microbiology Results (last 7 days)       ** No results found for the last 168 hours. **            MOST RECENT IMAGING  US Lower Extremity Veins Bilateral  Narrative: EXAMINATION:  US LOWER EXTREMITY VEINS BILATERAL    CLINICAL HISTORY:  TIA;    TECHNIQUE:  Duplex and color flow Doppler and dynamic compression was performed of the bilateral lower extremity veins was performed.    COMPARISON:  None    FINDINGS:  Right thigh veins: The common femoral, femoral, popliteal, upper greater saphenous, and deep femoral veins are patent and free of thrombus. The veins are normally compressible and have normal phasic flow and augmentation response.    Right calf veins: The visualized calf veins are patent.    Left thigh veins: The common femoral, femoral, popliteal, upper greater saphenous, and deep femoral veins are patent and free of thrombus. The veins are normally compressible and have normal phasic flow and augmentation response.    Left calf veins: The visualized calf veins are patent.    Miscellaneous: None  Impression: No evidence of deep venous thrombosis in either lower extremity.    Electronically signed by: Braxton Carroll MD  Date:    11/29/2023  Time:    11:34  MRI Brain Demyelinating W W/O Contrast  Narrative: EXAMINATION:  MRI BRAIN DEMYELINATING W/ WO CONTRAST    CLINICAL HISTORY:  Demyelinating disease;Rule out MS;.    TECHNIQUE:  Multiplanar multisequence MR imaging of the brain was performed before and after the administration of 10 mL Gadavist intravenous contrast.  Diffusion-weighted imaging was performed.  ADC map was generated.    COMPARISON:  Unenhanced brain MRI dated 11/27/2023    FINDINGS:  Intracranial compartment:    There is no acute abnormality or change in the appearance of the brain compared to yesterday study.  There " is stable minimal nonspecific white matter change without associated hemorrhage or enhancement.  There is no acute infarction.  There is no hydrocephalus or midline shift  Impression: There is no acute abnormality or change in the appearance of the brain compared to yesterday study.  There is stable minimal nonspecific white matter change.  There is no abnormal enhancement.  There is no hemorrhage, mass or acute infarction.    Electronically signed by: Navneet Payne MD  Date:    11/29/2023  Time:    09:08  MRI Cervical Spine Demyelinating W W/O Contrast  Narrative: EXAMINATION:  MRI CERVICAL SPINE DEMYELINATING W W/O CONTRAST    CLINICAL HISTORY:  rule out MS;    TECHNIQUE:  Multiplanar multisequence pre and post contrast enhanced cervical spine MRI was performed.10 ml Gadavist were administered intravenously without reported reaction.    COMPARISON:  None.    FINDINGS:  Vertebral column: There is degenerative change which will be described by level.  The study is mildly motion degraded.  There is straightening of the cervical spine with loss of normal lordosis.  This could be positional.  The vertebral bodies maintain normal height and alignment.  There is no significant disc space narrowing.  Baseline marrow signal is normal.  The odontoid process is intact.  There is no abnormal enhancement within the vertebral bodies or discs.    Spinal canal, cord, epidural space: The spinal canal may be borderline small on a developmental basis.  The cord is grossly normal in signal intensity without abnormal enhancement.  It should be noted that patient motion does limit evaluation of cord signal.    Findings by level:    C2-3: There is a minimal disc bulge and mild left facet joint arthropathy.  There is no spinal canal or significant foraminal stenosis.    C3-4: There is mild bilateral uncovertebral spurring with a mild disc osteophyte complex eccentric to the right resulting in mild spinal stenosis and moderate  bilateral foraminal stenosis.  There is flattening of the ventral cord surface.    C4-5: There is a tiny central disc protrusion.  There is mild bilateral uncovertebral spurring and facet joint arthropathy.  There is borderline spinal stenosis with at least mild bilateral foraminal stenosis.    C5-6: There is a broad left paracentral disc protrusion with annular fissure which flattens the left ventral cord surface.  There is mild uncovertebral spurring.  There is at least mild spinal stenosis and left foraminal stenosis.    C6-7: There is a broad right paracentral disc protrusion with annular fissure which flattens the right ventral cord surface and results in severe right lateral recess stenosis, mild spinal stenosis.  There is mild left foraminal stenosis due to uncovertebral spurring and facet joint arthropathy.    C7-T1: There is no spinal canal or foraminal stenosis.    Soft tissues, other: The prevertebral soft tissues are grossly normal.  The airway is patent.  Impression: 1. There is multilevel degenerative change discussed in detail by level above.  There is no fracture or malalignment.  The study is motion degraded which somewhat limits evaluation of cord surface but no definite cord signal abnormality is identified on the axial images.  Motion limits evaluation on the STIR sagittal images.  2. There is a broad right-sided disc protrusion at the C3-4 level.  There is a larger left paracentral disc protrusion at C5-6 and a right paracentral disc protrusion at C6-7 levels.  There is flattening of the cord surface at these levels but again there is no acute cord edema.  There is no myelomalacia in there is no definite demyelination.  Please see above discussion.    Electronically signed by: Navneet Payne MD  Date:    11/29/2023  Time:    09:00  MRI Thoracic Spine Demyelinating W W/O Contrast  Narrative: EXAMINATION:  MRI THORACIC SPINE DEMYELINATING W W/O CONTRAST    CLINICAL HISTORY:  rule out  MS;    TECHNIQUE:  Multiplanar, multisequence images were performed through the thoracic spine prior to and after the uneventful intravenous administration of 10 mL Gadavist.    COMPARISON:  None    FINDINGS:  Vertebral column:    The study is mildly motion degraded.  The thoracic vertebral bodies maintain normal height and alignment.  There is no significant disc space narrowing.  There is very mild multilevel endplate osteophyte formation.  Baseline marrow signal is normal.  There is no abnormal enhancement within the vertebral bodies or discs.    Spinal canal, cord, epidural space:    The spinal canal is developmentally normal.  The cord is normal in caliber, contour and signal intensity.  There is no abnormal enhancement.    Findings by level:    There is a minimal disc bulge at the T7-8 level.  There is no significant disc bulge or focal disc extrusion at any level.  There is no spinal canal or significant foraminal stenosis.  There is no acute cord or suspected nerve root compression.    Soft tissues, other: The prevertebral soft tissues are grossly normal.  The kidneys are normal without hydronephrosis.  Impression: 1. There is no acute or significant abnormality.  There is no fracture or malalignment.  There is no spinal canal or foraminal stenosis.  There is no cord or nerve root compression.  There is no abnormal enhancement within the vertebral bodies, discs or cord.  There is a minimal disc bulge at the T7-8 level.    Electronically signed by: Navneet Payne MD  Date:    11/29/2023  Time:    07:57      ECHOCARDIOGRAM RESULTS (last 5)  Results for orders placed during the hospital encounter of 11/27/23    Echo Saline Bubble? Yes    Interpretation Summary    There was 20 mL of intravenous agitated saline (bubble) used. Study is positive for intracardiac shunt that is right to left. Overall the study quality was adequate    Left Ventricle: The left ventricle is normal in size. Normal wall thickness.  Normal wall motion. There is normal systolic function with a visually estimated ejection fraction of 55 - 60%. There is normal diastolic function.    Right Ventricle: Normal right ventricular cavity size. Wall thickness is normal. Right ventricle wall motion  is normal. Systolic function is normal.    Tricuspid Valve: There is mild regurgitation.    IVC/SVC: Normal venous pressure at 3 mmHg.      CURRENT/PREVIOUS VISIT EKG  No results found for this or any previous visit.          ASSESSMENT & PLAN:   Left arm numbness    RAYRAY today showed a small PFO with small left to right shunt by color flow doppler and small right to left shunt by agitated saline study with a small amount bubbles crossing to the left. MRI cervical spine showed degenerative disk disease, spinal stenosis and radicular compression. Neurology is suspecting her left arm numbness is likely due to the radiculopathy.     Follow up with cardiology outpatient.      Thuy Jane MD  Date of Service: 11/30/2023  12:22 PM

## 2023-11-30 NOTE — PROGRESS NOTES
RAYRAY procedure explained to patient.  Pt verbalizes understanding.  Pt transported to procedure room via wheelchair.  Monitors applied.  Consents obtained by doctors.  Monitors applied.  Sedation and monitoring per anesthesia.  RAYRAY probe inserted by Dr Jane.  Ultrasound pictures obtained with bubble study.  Probe removed. And pt recovered for 30 min til back to pre procedure state.  Pt transported back to room and report given.

## 2023-11-30 NOTE — PROGRESS NOTES
[facility]/Surg  Neurology  Progress Note    Patient Name: Uma Bangura  MRN: [id number]  Admission Date: 11/27/2023  Hospital Length of Stay: 0 days  Code Status: Full Code   Attending Provider: Danae Arias MD   Consulting Provider: Kaylyn Grossman DNP  Primary Care Physician: Marine Pillai MD  Principal Problem:TIA (transient ischemic attack)    Consults  Subjective:     Chief Complaint:  left arm numbness  starting yesterday afternoon at 1 pm    HPI: as per EMR: Uma Bangura 43-year-old female who presents emergency room for evaluation of left arm numbness/weakness and left facial tingling.  The symptoms onset approximately 1:00 p.m. today.  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  She reports the symptoms did not resolve until several hours later when she came to the emergency room.  She denies any word-finding difficulty, dysarthria, slurred speech, or facial asymmetry.  She denies any recent illness or injury.  Denies fever or chills.  No aggravating alleviating factors.  Previous medical history includes GERD, anxiety, hyperlipidemia.  ER workup:  CBC and CMP were unremarkable.  Triglycerides elevated at 322.  CTA of the head and neck were negative for any acute findings.  MRI is pending at the time my exam.  Patient be admitted to Hospital Medicine for treatment management.  Patient placed on CVA pathway.  Consult Neurology.     Neurology Consult Note: PAatient seen and examined, POC discussed with Dr Zuniga. She is alert and oriented x 3. She reports improvement in LA numbness but reports symptoms are still present. She denies weakness, vision deficit, speech difficulty, facial weakness, or headache. 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 She also reports she has history of tension HAs that starts with tightness of muscle in shoulders and neck radiates upward.     11/30: Patient seen and examined this morning,  POC discussed with Dr Zuniga. Cardiology at bedside and reports RAYRAY revealed small PFO but no thrombus.     Past Medical History:   Diagnosis Date    Anxiety state, unspecified     Disturbance of salivary secretion     Edema     GERD (gastroesophageal reflux disease)     Goiter, unspecified     Nonspecific elevation of levels of transaminase or lactic acid dehydrogenase (LDH)     Polyphagia(783.6)     Unspecified sleep apnea     Varices of other sites        Past Surgical History:   Procedure Laterality Date     SECTION      x 2    ENDOMETRIAL ABLATION      OOPHORECTOMY Left     Dermoid cyst    TUBAL LIGATION         Review of patient's allergies indicates:   Allergen Reactions    Sertraline      Other reaction(s): heartburn       Current Neurological Medications: aspirin, atorvastatin     No current facility-administered medications on file prior to encounter.     Current Outpatient Medications on File Prior to Encounter   Medication Sig    ammonium lactate (LAC-HYDRIN) 12 % lotion Apply 1 g topically 2 (two) times daily as needed for Dry Skin.    clobetasoL (TEMOVATE) 0.05 % external solution Apply 1 mL topically 2 (two) times daily.    cycloSPORINE (RESTASIS) 0.05 % ophthalmic emulsion Place 1 drop into both eyes 2 (two) times daily.    ferrous sulfate 325 (65 FE) MG EC tablet TAKE ONE TABLET BY MOUTH EVERY DAY (Patient taking differently: Take 325 mg by mouth once daily.)    magnesium gluconate (MAG-G) 27 mg magnesium (500 mg) Tab tablet Take 27 mg by mouth once.    potassium citrate 99 mg Cap Take 1 capsule by mouth once daily.      Family History       Problem Relation (Age of Onset)    Cancer Mother    Diabetes Father    Hypertension Mother, Father          Tobacco Use    Smoking status: Never    Smokeless tobacco: Never   Substance and Sexual Activity    Alcohol use: Yes     Comment: occasionally    Drug use: No    Sexual activity: Yes     Partners: Male     Birth control/protection: See  Surgical Hx     Comment:      Review of Systems   Constitutional: Negative.    HENT: Negative.     Eyes: Negative.    Respiratory: Negative.     Cardiovascular: Negative.    Musculoskeletal:  Positive for myalgias.   Neurological:  Positive for numbness. Negative for dizziness, facial asymmetry, speech difficulty, weakness and headaches.     Objective:     Vital Signs (Most Recent):  Temp: 97.9 °F (36.6 °C) (11/30/23 0800)  Pulse: 75 (11/30/23 0818)  Resp: 18 (11/30/23 0818)  BP: (!) 107/58 (11/30/23 0800)  SpO2: 95 % (11/30/23 0818) Vital Signs (24h Range):  Temp:  [97.4 °F (36.3 °C)-98.7 °F (37.1 °C)] 97.9 °F (36.6 °C)  Pulse:  [75-96] 75  Resp:  [16-20] 18  SpO2:  [95 %-99 %] 95 %  BP: (107-138)/(58-80) 107/58     Weight: 110.2 kg (243 lb)  Body mass index is 39.22 kg/m².    Physical Exam  Vitals and nursing note reviewed.   HENT:      Nose: Nose normal.      Mouth/Throat:      Mouth: Mucous membranes are moist.      Pharynx: Oropharynx is clear.   Eyes:      Extraocular Movements: EOM normal.      Conjunctiva/sclera: Conjunctivae normal.      Pupils: Pupils are equal, round, and reactive to light.   Cardiovascular:      Rate and Rhythm: Normal rate.   Pulmonary:      Effort: Pulmonary effort is normal.   Musculoskeletal:         General: Normal range of motion.      Cervical back: Normal range of motion.      Comments: Increased tone noted to neck and shoulder muscles    Neurological:      Mental Status: She is alert and oriented to person, place, and time.      Motor: Motor strength is normal.     Coordination: Finger-Nose-Finger Test normal.      Deep Tendon Reflexes:      Reflex Scores:       Tricep reflexes are 1+ on the right side and 1+ on the left side.       Bicep reflexes are 1+ on the right side and 1+ on the left side.       Brachioradialis reflexes are 1+ on the right side and 1+ on the left side.       Patellar reflexes are 3+ on the right side and 3+ on the left side.  Psychiatric:          Speech: Speech normal.         NEUROLOGICAL EXAMINATION:     MENTAL STATUS   Oriented to person, place, and time.   Follows 1 step commands.   Attention: normal.   Speech: speech is normal   Level of consciousness: alert  Knowledge: good.   Able to name object. Able to repeat. Normal comprehension.     CRANIAL NERVES     CN II   Right visual field deficit: none  Left visual field deficit: none     CN III, IV, VI   Pupils are equal, round, and reactive to light.  Extraocular motions are normal.   Right pupil: Size: 4 mm. Shape: regular. Reactivity: brisk.   Left pupil: Size: 4 mm. Shape: regular. Reactivity: brisk.     CN V   Facial sensation intact.     CN VII   Facial expression full, symmetric.     CN VIII   Hearing: intact    CN XI   CN XI normal.     CN XII   Tongue deviation: none    MOTOR EXAM   Muscle bulk: normal  Overall muscle tone: normal    Strength   Strength 5/5 throughout.     REFLEXES     Reflexes   Right brachioradialis: 1+  Left brachioradialis: 1+  Right biceps: 1+  Left biceps: 1+  Right triceps: 1+  Left triceps: 1+  Right patellar: 3+  Left patellar: 3+    SENSORY EXAM   Light touch normal.     GAIT AND COORDINATION     Gait  Gait: (not tested)     Coordination   Finger to nose coordination: normal    Tremor   Resting tremor: absent      Significant Labs: Hemoglobin A1c:   Recent Labs   Lab 11/27/23  1616   HGBA1C 5.7       BMP:   Recent Labs   Lab 11/29/23  0501   GLU 99      K 4.3      CO2 27   BUN 18   CREATININE 1.1   CALCIUM 9.3       CBC:   Recent Labs   Lab 11/29/23  0501   WBC 7.94   HGB 13.9   HCT 42.2          CMP:   Recent Labs   Lab 11/29/23  0501   GLU 99      K 4.3      CO2 27   BUN 18   CREATININE 1.1   CALCIUM 9.3   PROT 6.8   ALBUMIN 3.6   BILITOT 0.5   ALKPHOS 62   AST 24   ALT 31   ANIONGAP 9       All pertinent lab results from the past 24 hours have been reviewed.    Significant Imaging: I have reviewed all pertinent imaging results/findings  within the past 24 hours.    CTA Head and Neck (xpd)  Order: [id number]  Status: Final result       Visible to patient: Yes (seen)       Next appt: 12/08/2023 at 04:00 PM in Family Medicine (Tyrone Rubio NP)    0 Result Notes  Details    Reading Physician Reading Date Result Priority   Anders Webb MD  [phone] 11/27/2023 STAT     Narrative & Impression  EXAMINATION:  CTA HEAD AND NECK (XPD)     CLINICAL HISTORY:  Neuro deficit, acute, stroke suspected;     TECHNIQUE:  Non contrast low dose axial images were obtained through the head. CT angiogram was performed from the level of the joey to the top of the head following the IV administration of 75ML of Omnipaque 350.   Sagittal and coronal reconstructions and maximum intensity projection reconstructions were performed. Arterial stenosis percentages are based on NASCET measurement criteria.     COMPARISON:  Head CT 12/09/2022.     FINDINGS:  CTA HEAD:     Vasculature: The intracranial arteries show normal anatomy without evidence of major branch occlusion or focal luminal narrowing.There is no suspicion of vascular malformation or saccular aneurysm.     Variant anatomy: Developmentally diminutive distal right vertebral artery and left A1 CLEMENTINA.     Brain: There is no evidence of mass, mass effect, edema, midline shift, intracranial hemorrhage, or space-occupying extra-axial fluid collection. After the administration of contrast there is no abnormal enhancement.     Ventricles/Sulci: The ventricles and sulci are appropriate in size for age.     Osseous Structures: The osseous structures are unremarkable in appearance.     Sinuses and orbits: The visualized portions of the paranasal sinuses and orbits are unremarkable.     Other: Visualized portions of the mastoids are unremarkable.     CTA NECK:     Aorta: Conventional branching pattern. The great vessel origins are patent without flow limiting stenosis.     Vertebral Arteries: The origins of the vertebral  arteries are patent.  No flow limiting stenosis, occlusion, or dissection.     Right Carotid: No flow limiting stenosis, occlusion, or dissection of the common carotid or internal carotid arteries.  No significant plaque of the proximal ICA.  Right internal carotid artery: 0 % stenosis by and NASCET.     Left Carotid: No flow limiting stenosis, occlusion, or dissection of the common carotid or internal carotid arteries. No significant plaque of the proximal ICA. Right internal carotid artery: 0 % stenosis by and NASCET.     Extravascular Anatomy: No definite adjacent soft tissue abnormality seen accounting for technique used.     Impression:     1. Negative pre and postcontrast CT imaging of the head.  No acute process.  2. Negative CTA of the head and neck.  The critical information above was relayed directly by Anders Webb secure chat to Jeff Santizo Jr. on 11/27/2023 at 16:36.        Electronically signed by: Anders Webb  Date:                                            11/27/2023  Time:                                           16:38   MRI Brain Demyelinating W W/O Contrast  Order: [id number]  Status: Final result       Visible to patient: Yes (seen)       Next appt: 12/08/2023 at 04:00 PM in Family Medicine (Tyrone Rubio NP)    0 Result Notes  Details    Reading Physician Reading Date Result Priority   Navneet Payne MD  [phone] 11/29/2023 STAT     Narrative & Impression  EXAMINATION:  MRI BRAIN DEMYELINATING W/ WO CONTRAST     CLINICAL HISTORY:  Demyelinating disease;Rule out MS;.     TECHNIQUE:  Multiplanar multisequence MR imaging of the brain was performed before and after the administration of 10 mL Gadavist intravenous contrast.  Diffusion-weighted imaging was performed.  ADC map was generated.     COMPARISON:  Unenhanced brain MRI dated 11/27/2023     FINDINGS:  Intracranial compartment:     There is no acute abnormality or change in the appearance of the brain compared to yesterday  study.  There is stable minimal nonspecific white matter change without associated hemorrhage or enhancement.  There is no acute infarction.  There is no hydrocephalus or midline shift     Impression:     There is no acute abnormality or change in the appearance of the brain compared to yesterday study.  There is stable minimal nonspecific white matter change.  There is no abnormal enhancement.  There is no hemorrhage, mass or acute infarction.        Electronically signed by: Navneet Payne MD  Date:                                            11/29/2023  Time:                                           09:08   MRI Cervical Spine Demyelinating W W/O Contrast  Order: [id number]  Status: Final result       Visible to patient: Yes (seen)       Next appt: 12/08/2023 at 04:00 PM in Family Medicine (Tyrone Rubio NP)    0 Result Notes  Details    Reading Physician Reading Date Result Priority   Navneet Payne MD  [phone] 11/29/2023 STAT     Narrative & Impression  EXAMINATION:  MRI CERVICAL SPINE DEMYELINATING W W/O CONTRAST     CLINICAL HISTORY:  rule out MS;     TECHNIQUE:  Multiplanar multisequence pre and post contrast enhanced cervical spine MRI was performed.10 ml Gadavist were administered intravenously without reported reaction.     COMPARISON:  None.     FINDINGS:  Vertebral column: There is degenerative change which will be described by level.  The study is mildly motion degraded.  There is straightening of the cervical spine with loss of normal lordosis.  This could be positional.  The vertebral bodies maintain normal height and alignment.  There is no significant disc space narrowing.  Baseline marrow signal is normal.  The odontoid process is intact.  There is no abnormal enhancement within the vertebral bodies or discs.     Spinal canal, cord, epidural space: The spinal canal may be borderline small on a developmental basis.  The cord is grossly normal in signal intensity without abnormal  enhancement.  It should be noted that patient motion does limit evaluation of cord signal.     Findings by level:     C2-3: There is a minimal disc bulge and mild left facet joint arthropathy.  There is no spinal canal or significant foraminal stenosis.     C3-4: There is mild bilateral uncovertebral spurring with a mild disc osteophyte complex eccentric to the right resulting in mild spinal stenosis and moderate bilateral foraminal stenosis.  There is flattening of the ventral cord surface.     C4-5: There is a tiny central disc protrusion.  There is mild bilateral uncovertebral spurring and facet joint arthropathy.  There is borderline spinal stenosis with at least mild bilateral foraminal stenosis.     C5-6: There is a broad left paracentral disc protrusion with annular fissure which flattens the left ventral cord surface.  There is mild uncovertebral spurring.  There is at least mild spinal stenosis and left foraminal stenosis.     C6-7: There is a broad right paracentral disc protrusion with annular fissure which flattens the right ventral cord surface and results in severe right lateral recess stenosis, mild spinal stenosis.  There is mild left foraminal stenosis due to uncovertebral spurring and facet joint arthropathy.     C7-T1: There is no spinal canal or foraminal stenosis.     Soft tissues, other: The prevertebral soft tissues are grossly normal.  The airway is patent.     Impression:     1. There is multilevel degenerative change discussed in detail by level above.  There is no fracture or malalignment.  The study is motion degraded which somewhat limits evaluation of cord surface but no definite cord signal abnormality is identified on the axial images.  Motion limits evaluation on the STIR sagittal images.  2. There is a broad right-sided disc protrusion at the C3-4 level.  There is a larger left paracentral disc protrusion at C5-6 and a right paracentral disc protrusion at C6-7 levels.  There is  flattening of the cord surface at these levels but again there is no acute cord edema.  There is no myelomalacia in there is no definite demyelination.  Please see above discussion.        Electronically signed by: Navneet Payne MD  Date:                                            11/29/2023  Time:                                           09:00     MRI Thoracic Spine Demyelinating W W/O Contrast  Order: [id number]  Status: Final result       Visible to patient: Yes (seen)       Next appt: 12/08/2023 at 04:00 PM in Family Medicine (Tyrone Rubio NP)    0 Result Notes  Details    Reading Physician Reading Date Result Priority   Navneet Payne MD  [phone] 11/29/2023 STAT     Narrative & Impression  EXAMINATION:  MRI THORACIC SPINE DEMYELINATING W W/O CONTRAST     CLINICAL HISTORY:  rule out MS;     TECHNIQUE:  Multiplanar, multisequence images were performed through the thoracic spine prior to and after the uneventful intravenous administration of 10 mL Gadavist.     COMPARISON:  None     FINDINGS:  Vertebral column:     The study is mildly motion degraded.  The thoracic vertebral bodies maintain normal height and alignment.  There is no significant disc space narrowing.  There is very mild multilevel endplate osteophyte formation.  Baseline marrow signal is normal.  There is no abnormal enhancement within the vertebral bodies or discs.     Spinal canal, cord, epidural space:     The spinal canal is developmentally normal.  The cord is normal in caliber, contour and signal intensity.  There is no abnormal enhancement.     Findings by level:     There is a minimal disc bulge at the T7-8 level.  There is no significant disc bulge or focal disc extrusion at any level.  There is no spinal canal or significant foraminal stenosis.  There is no acute cord or suspected nerve root compression.     Soft tissues, other: The prevertebral soft tissues are grossly normal.  The kidneys are normal without  hydronephrosis.     Impression:     1. There is no acute or significant abnormality.  There is no fracture or malalignment.  There is no spinal canal or foraminal stenosis.  There is no cord or nerve root compression.  There is no abnormal enhancement within the vertebral bodies, discs or cord.  There is a minimal disc bulge at the T7-8 level.        Electronically signed by: Navneet Payne MD  Date:                                            11/29/2023  Time:                                           07:57     Assessment and Plan:    Left arm numbness   Rule out TIA   Rule out Demyelinating disease     CTA h/n: negative   ECHO: + PFO, LA normal   RAYRAY: + small PFO, no thrombus   MRI brain wo contrast: negative for acute intracranial abnormality   Obtain MRI brain w contrast: negative for demyelinating disease   Obtain MRI C spine w and wo contrast: ML degenerative disc disease, noted to be motion degraded but no obvious cord signal abnormality noted; no abnormal enhancement, chronic changes detailed above including mild spinal stenosis and and radicular compression   MRI T spine w and wo contrast: negative for cord abnormality / abnormal enhancement; mild chronic changes as detailed above    Continue aspirin 81 mg and atorvastatin 40 mg for now for stroke prevention   Safety precautions in place   PT/OT/ST eval and treat   Recommend outpatient follow up with neurosurgery for cervical degenerative disc disease with stenosis of spinal canal and radicular compression   Patient cleared for discharge from neurology standpoint     Patient to follow up with [facility] at [phone] within 2 weeks from discharge.  Stroke education was provided including stroke risk factors modification and any acute neurological changes including weakness, confusion, visual changes to come straight to the ER.  Seizure educaation was provided including no driving, no swimming by self, no operation of heavy machinery or  climbing on ladders.  All side effects of new medications were discussed with patient and/or next of kin and all questions were answered.       Active Diagnoses:    Diagnosis Date Noted POA    PRINCIPAL PROBLEM:  TIA (transient ischemic attack) [G45.9] 11/27/2023 Yes      Problems Resolved During this Admission:       VTE Risk Mitigation (From admission, onward)           Ordered     IP VTE HIGH RISK PATIENT  Once         11/27/23 1843     Place sequential compression device  Until discontinued         11/27/23 1843     Place PIYUSH hose  Until discontinued         11/27/23 1843                    Thank you for your consult. I will follow-up with patient. Please contact us if you have any additional questions.    aKylyn Grossman DNP  Neurology  [facility]/Surg

## 2023-11-30 NOTE — PLAN OF CARE
Problem: Adjustment to Illness (Stroke, Ischemic/Transient Ischemic Attack)  Goal: Optimal Coping  Outcome: Ongoing, Progressing     Problem: Fall Injury Risk  Goal: Absence of Fall and Fall-Related Injury  Outcome: Ongoing, Progressing     Problem: Adult Inpatient Plan of Care  Goal: Plan of Care Review  Outcome: Ongoing, Progressing  Goal: Patient-Specific Goal (Individualized)  Outcome: Ongoing, Progressing  Goal: Absence of Hospital-Acquired Illness or Injury  Outcome: Ongoing, Progressing  Goal: Optimal Comfort and Wellbeing  Outcome: Ongoing, Progressing

## 2023-11-30 NOTE — PROCEDURES
Brief note:    No evidence of intracardiac thrombus on RAYRAY. There is a small PFO with small left to right shunt by color flow doppler and small right to left shunt by agitated saline study with a small amount bubbles crossing to the left.

## 2023-11-30 NOTE — ANESTHESIA PREPROCEDURE EVALUATION
11/30/2023  Uma Bangura is a 43 y.o., female.      Pre-op Assessment    I have reviewed the Patient Summary Reports.     I have reviewed the Nursing Notes. I have reviewed the NPO Status.   I have reviewed the Medications.     Review of Systems  Pulmonary:        Sleep Apnea     Obstructive Sleep Apnea (NICOLAS).           Hepatic/GI:     GERD      Gerd          Neurological:  TIA,                         TIA - Transient Ischemic Attack               Endocrine:        Morbid Obesity / BMI > 40      Physical Exam  General: Well nourished    Airway:  Mallampati: III   Mouth Opening: Normal  TM Distance: Normal  Neck ROM: Normal ROM        Anesthesia Plan  Type of Anesthesia, risks & benefits discussed:    Anesthesia Type: MAC  Intra-op Monitoring Plan: Standard ASA Monitors  Informed Consent: Informed consent signed with the Patient and all parties understand the risks and agree with anesthesia plan.  All questions answered.   ASA Score: 3    Ready For Surgery From Anesthesia Perspective.     .

## 2023-11-30 NOTE — TRANSFER OF CARE
"Anesthesia Transfer of Care Note    Patient: Uma Bangura    Procedure(s) Performed: Procedure(s) (LRB):  Transesophageal echo (RAYRAY) intra-procedure log documentation (N/A)    Patient location: Other: ICU Proedure room    Anesthesia Type: general    Transport from OR: Transported from OR on room air with adequate spontaneous ventilation    Post pain: adequate analgesia    Post assessment: no apparent anesthetic complications and tolerated procedure well    Post vital signs: stable    Level of consciousness: responds to stimulation and sedated    Nausea/Vomiting: no nausea/vomiting    Complications: none    Transfer of care protocol was followed    Last vitals: Visit Vitals  BP (!) 107/58 (BP Location: Right arm, Patient Position: Lying)   Pulse 75   Temp 36.6 °C (97.9 °F) (Oral)   Resp 18   Ht 5' 6" (1.676 m)   Wt 110.2 kg (243 lb)   LMP 11/06/2023   SpO2 95%   Breastfeeding No   BMI 39.22 kg/m²     "

## 2023-11-30 NOTE — PLAN OF CARE
Problem: Adjustment to Illness (Stroke, Ischemic/Transient Ischemic Attack)  Goal: Optimal Coping  Outcome: Ongoing, Progressing     Problem: Bowel Elimination Impaired (Stroke, Ischemic/Transient Ischemic Attack)  Goal: Effective Bowel Elimination  Outcome: Ongoing, Progressing     Problem: Adult Inpatient Plan of Care  Goal: Patient-Specific Goal (Individualized)  Outcome: Ongoing, Progressing

## 2023-12-01 DIAGNOSIS — D50.9 IRON DEFICIENCY ANEMIA, UNSPECIFIED IRON DEFICIENCY ANEMIA TYPE: ICD-10-CM

## 2023-12-02 NOTE — HOSPITAL COURSE
Patient presented with left arm numbness, and per neurology recommendation underwent complete workup for stroke vs demyelinating disease. MRIs showed no evidence of demyelination. Patient diagnosed with TIA.   Started on ASA and statin for secondary stroke prevention. C spine MRI did show some disc disease, so recommending patient to be referred to neurosurgery as outpatient. She also needs follow up with cardiology for evaluation of small PFO seen on echo.    Opt out

## 2023-12-02 NOTE — DISCHARGE SUMMARY
[facility] Medicine  Discharge Summary      Patient Name: Uma Bangura  MRN: [id number]  MALENA: [id number]  Patient Class: OP- Observation  Admission Date: 11/27/2023  Hospital Length of Stay: 0 days  Discharge Date and Time: 11/30/2023  1:37 PM  Attending Physician: No att. providers found   Discharging Provider: Danae Arias MD  Primary Care Provider: Marine Pillai MD    Primary Care Team: Networked reference to record PCT     HPI:   Uma Bangura 43-year-old female who presents emergency room for evaluation of left arm numbness/weakness and left facial tingling.  The symptoms onset approximately 1:00 p.m. today.  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  She reports the symptoms did not resolve until several hours later when she came to the emergency room.  She denies any word-finding difficulty, dysarthria, slurred speech, or facial asymmetry.  She denies any recent illness or injury.  Denies fever or chills.  No aggravating alleviating factors.  Previous medical history includes GERD, anxiety, hyperlipidemia.  ER workup:  CBC and CMP were unremarkable.  Triglycerides elevated at 322.  CTA of the head and neck were negative for any acute findings.  MRI is pending at the time my exam.  Patient be admitted to Hospital Medicine for treatment management.  Patient placed on CVA pathway.  Consult Neurology.    Procedure(s) (LRB):  Transesophageal echo (RAYRAY) intra-procedure log documentation (N/A)      Hospital Course:   Patient presented with left arm numbness, and per neurology recommendation underwent complete workup for stroke vs demyelinating disease. MRIs showed no evidence of demyelination. Patient diagnosed with TIA.   Started on ASA and statin for secondary stroke prevention. C spine MRI did show some disc disease, so recommending patient to be referred to neurosurgery as outpatient. She also needs follow up with cardiology for evaluation of small PFO  seen on echo.      Goals of Care Treatment Preferences:  Code Status: Full Code      Consults:   Consults (From admission, onward)          Status Ordering Provider     Inpatient consult to Cardiology  Once        Provider:  Thuy Jane MD    Completed LIZBETH ATWOOD     Inpatient consult to Registered Dietitian/Nutritionist  Once        Provider:  (Not yet assigned)    Completed LANDY ARMANDO     IP consult to case management/social work  Once        Provider:  (Not yet assigned)    Completed LANDY ARMANDO     Inpatient consult to Neurology  Once        Provider:  Jennifer Zuniga MD    Completed LANDY ARMANDO     Consult to Telemedicine - Acute Stroke  Once        Provider:  Barb Sexton MD    Completed CRISTOPHER SAUCEDA JR.            No new Assessment & Plan notes have been filed under this hospital service since the last note was generated.  Service: Hospital Medicine    Final Active Diagnoses:    Diagnosis Date Noted POA    PRINCIPAL PROBLEM:  TIA (transient ischemic attack) [G45.9] 11/27/2023 Yes      Problems Resolved During this Admission:       Discharged Condition: good    Disposition: Home or Self Care    Follow Up:   Follow-up Information       Marine Pillai MD Follow up.    Specialty: Family Medicine  Contact information:  [address] [facility] [address]  [phone]               Jennifer Zuniga MD Follow up.    Specialty: Neurology  Why: requested apt  office to call you for scheduling- please call office if you do not hear from them in next few days  Contact information:  [address]  [facility] [address] [phone]                           Patient Instructions:      Notify your health care provider if you experience any of the following:  temperature >100.4     Notify your health care provider if you experience any of the following:  persistent nausea and vomiting or diarrhea     Notify your health care provider if you experience any of  "the following:  severe uncontrolled pain     Notify your health care provider if you experience any of the following:  difficulty breathing or increased cough     Notify your health care provider if you experience any of the following:  increased confusion or weakness     Notify your health care provider if you experience any of the following:  persistent dizziness, light-headedness, or visual disturbances     Notify your health care provider if you experience any of the following:  severe persistent headache     Activity as tolerated       Significant Diagnostic Studies: Labs: BMP: No results for input(s): "GLU", "NA", "K", "CL", "CO2", "BUN", "CREATININE", "CALCIUM", "MG" in the last 48 hours., CBC No results for input(s): "WBC", "HGB", "HCT", "PLT" in the last 48 hours., Lipid Panel   Lab Results   Component Value Date    CHOL 195 11/27/2023    HDL 49 11/27/2023    LDLCALC 81.6 11/27/2023    TRIG 322 (H) 11/27/2023    CHOLHDL 25.1 11/27/2023   , Troponin No results for input(s): "TROPONINI" in the last 168 hours., and A1C:   Recent Labs   Lab 11/27/23  1616   HGBA1C 5.7       Pending Diagnostic Studies:       Procedure Component Value Units Date/Time    Transesophageal echo (RAYRAY) [[id number]] Resulted: 11/30/23 1220    Order Status: Sent Lab Status: In process Updated: 11/30/23 1220     BSA 2.27 m2            Medications:  Reconciled Home Medications:      Medication List        START taking these medications      aspirin 81 MG EC tablet  Commonly known as: ECOTRIN  Take 1 tablet (81 mg total) by mouth once daily.     atorvastatin 40 MG tablet  Commonly known as: LIPITOR  Take 1 tablet (40 mg total) by mouth once daily.            CHANGE how you take these medications      ferrous sulfate 325 (65 FE) MG EC tablet  TAKE ONE TABLET BY MOUTH EVERY DAY  What changed: when to take this            CONTINUE taking these medications      ammonium lactate 12 % lotion  Commonly known as: LAC-HYDRIN  Apply 1 g topically 2 " (two) times daily as needed for Dry Skin.     clobetasoL 0.05 % external solution  Commonly known as: TEMOVATE  Apply 1 mL topically 2 (two) times daily.     cycloSPORINE 0.05 % ophthalmic emulsion  Commonly known as: RESTASIS  Place 1 drop into both eyes 2 (two) times daily.     magnesium gluconate 27 mg magnesium (500 mg) Tab tablet  Commonly known as: MAG-G  Take 27 mg by mouth once.     potassium citrate 99 mg Cap  Take 1 capsule by mouth once daily.              Indwelling Lines/Drains at time of discharge:   Lines/Drains/Airways       None               Radiology Results (last 7 days)    Procedure Component Value Units Date/Time   RADIOLOGY REPORT [[id number]] Resulted: 11/27/23 0000   Order Status: Completed Updated: 11/29/23 1200   US Lower Extremity Veins Bilateral [[id number]] Resulted: 11/29/23 1134   Order Status: Completed Updated: 11/29/23 1136   Narrative:     EXAMINATION:  US LOWER EXTREMITY VEINS BILATERAL    CLINICAL HISTORY:  TIA;    TECHNIQUE:  Duplex and color flow Doppler and dynamic compression was performed of the bilateral lower extremity veins was performed.    COMPARISON:  None    FINDINGS:  Right thigh veins: The common femoral, femoral, popliteal, upper greater saphenous, and deep femoral veins are patent and free of thrombus. The veins are normally compressible and have normal phasic flow and augmentation response.    Right calf veins: The visualized calf veins are patent.    Left thigh veins: The common femoral, femoral, popliteal, upper greater saphenous, and deep femoral veins are patent and free of thrombus. The veins are normally compressible and have normal phasic flow and augmentation response.    Left calf veins: The visualized calf veins are patent.    Miscellaneous: None   Impression:       No evidence of deep venous thrombosis in either lower extremity.      Electronically signed by: Braxton Carroll MD  Date: 11/29/2023  Time: 11:34   MRI Brain Demyelinating W W/O Contrast  [[id number]] Resulted: 11/29/23 0908   Order Status: Completed Updated: 11/29/23 0910   Narrative:     EXAMINATION:  MRI BRAIN DEMYELINATING W/ WO CONTRAST    CLINICAL HISTORY:  Demyelinating disease;Rule out MS;.    TECHNIQUE:  Multiplanar multisequence MR imaging of the brain was performed before and after the administration of 10 mL Gadavist intravenous contrast.  Diffusion-weighted imaging was performed.  ADC map was generated.    COMPARISON:  Unenhanced brain MRI dated 11/27/2023    FINDINGS:  Intracranial compartment:    There is no acute abnormality or change in the appearance of the brain compared to yesterday study.  There is stable minimal nonspecific white matter change without associated hemorrhage or enhancement.  There is no acute infarction.  There is no hydrocephalus or midline shift   Impression:       There is no acute abnormality or change in the appearance of the brain compared to yesterday study.  There is stable minimal nonspecific white matter change.  There is no abnormal enhancement.  There is no hemorrhage, mass or acute infarction.      Electronically signed by: Navneet Payne MD  Date: 11/29/2023  Time: 09:08   MRI Cervical Spine Demyelinating W W/O Contrast [[id number]] Resulted: 11/29/23 0900   Order Status: Completed Updated: 11/29/23 0903   Narrative:     EXAMINATION:  MRI CERVICAL SPINE DEMYELINATING W W/O CONTRAST    CLINICAL HISTORY:  rule out MS;    TECHNIQUE:  Multiplanar multisequence pre and post contrast enhanced cervical spine MRI was performed.10 ml Gadavist were administered intravenously without reported reaction.    COMPARISON:  None.    FINDINGS:  Vertebral column: There is degenerative change which will be described by level.  The study is mildly motion degraded.  There is straightening of the cervical spine with loss of normal lordosis.  This could be positional.  The vertebral bodies maintain normal height and alignment.  There is no significant disc space  narrowing.  Baseline marrow signal is normal.  The odontoid process is intact.  There is no abnormal enhancement within the vertebral bodies or discs.    Spinal canal, cord, epidural space: The spinal canal may be borderline small on a developmental basis.  The cord is grossly normal in signal intensity without abnormal enhancement.  It should be noted that patient motion does limit evaluation of cord signal.    Findings by level:    C2-3: There is a minimal disc bulge and mild left facet joint arthropathy.  There is no spinal canal or significant foraminal stenosis.    C3-4: There is mild bilateral uncovertebral spurring with a mild disc osteophyte complex eccentric to the right resulting in mild spinal stenosis and moderate bilateral foraminal stenosis.  There is flattening of the ventral cord surface.    C4-5: There is a tiny central disc protrusion.  There is mild bilateral uncovertebral spurring and facet joint arthropathy.  There is borderline spinal stenosis with at least mild bilateral foraminal stenosis.    C5-6: There is a broad left paracentral disc protrusion with annular fissure which flattens the left ventral cord surface.  There is mild uncovertebral spurring.  There is at least mild spinal stenosis and left foraminal stenosis.    C6-7: There is a broad right paracentral disc protrusion with annular fissure which flattens the right ventral cord surface and results in severe right lateral recess stenosis, mild spinal stenosis.  There is mild left foraminal stenosis due to uncovertebral spurring and facet joint arthropathy.    C7-T1: There is no spinal canal or foraminal stenosis.    Soft tissues, other: The prevertebral soft tissues are grossly normal.  The airway is patent.   Impression:       1. There is multilevel degenerative change discussed in detail by level above.  There is no fracture or malalignment.  The study is motion degraded which somewhat limits evaluation of cord surface but no  definite cord signal abnormality is identified on the axial images.  Motion limits evaluation on the STIR sagittal images.  2. There is a broad right-sided disc protrusion at the C3-4 level.  There is a larger left paracentral disc protrusion at C5-6 and a right paracentral disc protrusion at C6-7 levels.  There is flattening of the cord surface at these levels but again there is no acute cord edema.  There is no myelomalacia in there is no definite demyelination.  Please see above discussion.      Electronically signed by: Navneet Payne MD  Date: 11/29/2023  Time: 09:00   MRI Thoracic Spine Demyelinating W W/O Contrast [[id number]] Resulted: 11/29/23 0757   Order Status: Completed Updated: 11/29/23 0759   Narrative:     EXAMINATION:  MRI THORACIC SPINE DEMYELINATING W W/O CONTRAST    CLINICAL HISTORY:  rule out MS;    TECHNIQUE:  Multiplanar, multisequence images were performed through the thoracic spine prior to and after the uneventful intravenous administration of 10 mL Gadavist.    COMPARISON:  None    FINDINGS:  Vertebral column:    The study is mildly motion degraded.  The thoracic vertebral bodies maintain normal height and alignment.  There is no significant disc space narrowing.  There is very mild multilevel endplate osteophyte formation.  Baseline marrow signal is normal.  There is no abnormal enhancement within the vertebral bodies or discs.    Spinal canal, cord, epidural space:    The spinal canal is developmentally normal.  The cord is normal in caliber, contour and signal intensity.  There is no abnormal enhancement.    Findings by level:    There is a minimal disc bulge at the T7-8 level.  There is no significant disc bulge or focal disc extrusion at any level.  There is no spinal canal or significant foraminal stenosis.  There is no acute cord or suspected nerve root compression.    Soft tissues, other: The prevertebral soft tissues are grossly normal.  The kidneys are normal without  hydronephrosis.   Impression:       1. There is no acute or significant abnormality.  There is no fracture or malalignment.  There is no spinal canal or foraminal stenosis.  There is no cord or nerve root compression.  There is no abnormal enhancement within the vertebral bodies, discs or cord.  There is a minimal disc bulge at the T7-8 level.      Electronically signed by: Navneet Payne MD  Date: 11/29/2023  Time: 07:57   MRI Brain Without Contrast [[id number]] Resulted: 11/28/23 0637   Order Status: Completed Updated: 11/28/23 0640   Narrative:     EXAM:  MRI BRAIN WITHOUT CONTRAST    CLINICAL HISTORY:  left arm numbness; .    COMPARISON:  CTA head and neck obtained the same day    FINDINGS:  Intracranial contents:There is no acute intracranial abnormality.  Brain volume, ventricular size and position are normal.  There is no intracranial hemorrhage or mass/mass effect.  There are no regions of restricted diffusion to suggest acute infarction.  There are just a few punctate foci of white matter FLAIR hyperintense signal which are completely nonspecific.  There is no hydrocephalus or midline shift.  There is no abnormal extra-axial fluid collection.  The basilar cisterns are open.  Flow voids indicating patency are present in the major vessels at the base of the brain.  The cerebellar tonsils are in normal position.  The sellar structures are normal.  The orbits are grossly normal.    Extracranial contents, calvarium, soft tissues: There is normal marrow signal intensity in the clivus and calvarium. There is trace scattered mucosal thickening in the ethmoid air cells.  Otherwise, the paranasal sinuses and mastoid air cells are clear.   Impression:       1. There is no acute abnormality.  There is no intracranial hemorrhage, mass/mass effect, acute edema or ischemia.  Note: Preliminary results were provided by Dr. Igor Mccormick ([facility]).  There is no significant discrepancy.      Electronically signed by:  Navneet Payne MD  Date: 11/28/2023  Time: 06:37   CTA Head and Neck (xpd) [[id number]] Resulted: 11/27/23 1638   Order Status: Completed Updated: 11/27/23 1641   Narrative:     EXAMINATION:  CTA HEAD AND NECK (XPD)    CLINICAL HISTORY:  Neuro deficit, acute, stroke suspected;    TECHNIQUE:  Non contrast low dose axial images were obtained through the head. CT angiogram was performed from the level of the joey to the top of the head following the IV administration of 75ML of Omnipaque 350.   Sagittal and coronal reconstructions and maximum intensity projection reconstructions were performed. Arterial stenosis percentages are based on NASCET measurement criteria.    COMPARISON:  Head CT 12/09/2022.    FINDINGS:  CTA HEAD:    Vasculature: The intracranial arteries show normal anatomy without evidence of major branch occlusion or focal luminal narrowing.There is no suspicion of vascular malformation or saccular aneurysm.    Variant anatomy: Developmentally diminutive distal right vertebral artery and left A1 CLEMENTINA.    Brain: There is no evidence of mass, mass effect, edema, midline shift, intracranial hemorrhage, or space-occupying extra-axial fluid collection. After the administration of contrast there is no abnormal enhancement.    Ventricles/Sulci: The ventricles and sulci are appropriate in size for age.    Osseous Structures: The osseous structures are unremarkable in appearance.    Sinuses and orbits: The visualized portions of the paranasal sinuses and orbits are unremarkable.    Other: Visualized portions of the mastoids are unremarkable.    CTA NECK:    Aorta: Conventional branching pattern. The great vessel origins are patent without flow limiting stenosis.    Vertebral Arteries: The origins of the vertebral arteries are patent.  No flow limiting stenosis, occlusion, or dissection.    Right Carotid: No flow limiting stenosis, occlusion, or dissection of the common carotid or internal carotid arteries.  No  significant plaque of the proximal ICA.  Right internal carotid artery: 0 % stenosis by and NASCET.    Left Carotid: No flow limiting stenosis, occlusion, or dissection of the common carotid or internal carotid arteries. No significant plaque of the proximal ICA. Right internal carotid artery: 0 % stenosis by and NASCET.    Extravascular Anatomy: No definite adjacent soft tissue abnormality seen accounting for technique used.   Impression:       1. Negative pre and postcontrast CT imaging of the head.  No acute process.  2. Negative CTA of the head and neck.  The critical information above was relayed directly by Anders Webb secure chat to Jeff Santizo Jr. on 11/27/2023 at 16:36.      Electronically signed by: Anders Webb  Date: 11/27/2023  Time: 16:38       Time spent on the discharge of patient: 35 minutes         Danae Arias MD  Department of Hospital Medicine  [facility]/Surg

## 2023-12-04 ENCOUNTER — PATIENT OUTREACH (OUTPATIENT)
Dept: ADMINISTRATIVE | Facility: CLINIC | Age: 43
End: 2023-12-04
Payer: COMMERCIAL

## 2023-12-04 ENCOUNTER — PATIENT MESSAGE (OUTPATIENT)
Dept: ADMINISTRATIVE | Facility: CLINIC | Age: 43
End: 2023-12-04
Payer: COMMERCIAL

## 2023-12-04 NOTE — PROGRESS NOTES
C3 nurse attempted to contact Uma Salazare for a TCC post hospital discharge follow up call. No answer. Left voicemail with callback information. The patient has a scheduled HOSFU with Tyrone Rubio NP on 12/8/23 at 1600. No messages routed at this time.

## 2023-12-05 NOTE — PROGRESS NOTES
C3 nurse spoke with Uma Bangura for a TCC post hospital discharge follow up call. Pt states she had a headache this am but took some tylenol and it went away and she denies any new symptoms. The patient has a scheduled HOSFU with Tyrone Rubio NP on 12/8/23 at 1600. No messages routed at this time.

## 2023-12-06 RX ORDER — FERROUS SULFATE TAB 325 MG (65 MG ELEMENTAL FE) 325 (65 FE) MG
TAB ORAL
Qty: 90 TABLET | Refills: 1 | Status: SHIPPED | OUTPATIENT
Start: 2023-12-06

## 2023-12-08 ENCOUNTER — OFFICE VISIT (OUTPATIENT)
Dept: FAMILY MEDICINE | Facility: CLINIC | Age: 43
End: 2023-12-08
Payer: COMMERCIAL

## 2023-12-08 ENCOUNTER — TELEPHONE (OUTPATIENT)
Dept: CARDIOLOGY | Facility: CLINIC | Age: 43
End: 2023-12-08
Payer: COMMERCIAL

## 2023-12-08 VITALS
OXYGEN SATURATION: 97 % | BODY MASS INDEX: 39.82 KG/M2 | SYSTOLIC BLOOD PRESSURE: 118 MMHG | HEIGHT: 66 IN | HEART RATE: 72 BPM | TEMPERATURE: 98 F | RESPIRATION RATE: 18 BRPM | DIASTOLIC BLOOD PRESSURE: 82 MMHG | WEIGHT: 247.81 LBS

## 2023-12-08 DIAGNOSIS — G45.9 TIA (TRANSIENT ISCHEMIC ATTACK): Primary | ICD-10-CM

## 2023-12-08 DIAGNOSIS — G43.809 OTHER MIGRAINE WITHOUT STATUS MIGRAINOSUS, NOT INTRACTABLE: ICD-10-CM

## 2023-12-08 PROCEDURE — 3074F PR MOST RECENT SYSTOLIC BLOOD PRESSURE < 130 MM HG: ICD-10-PCS | Mod: CPTII,S$GLB,, | Performed by: NURSE PRACTITIONER

## 2023-12-08 PROCEDURE — 3044F PR MOST RECENT HEMOGLOBIN A1C LEVEL <7.0%: ICD-10-PCS | Mod: CPTII,S$GLB,, | Performed by: NURSE PRACTITIONER

## 2023-12-08 PROCEDURE — 99214 PR OFFICE/OUTPT VISIT, EST, LEVL IV, 30-39 MIN: ICD-10-PCS | Mod: S$GLB,,, | Performed by: NURSE PRACTITIONER

## 2023-12-08 PROCEDURE — 3074F SYST BP LT 130 MM HG: CPT | Mod: CPTII,S$GLB,, | Performed by: NURSE PRACTITIONER

## 2023-12-08 PROCEDURE — 1159F PR MEDICATION LIST DOCUMENTED IN MEDICAL RECORD: ICD-10-PCS | Mod: CPTII,S$GLB,, | Performed by: NURSE PRACTITIONER

## 2023-12-08 PROCEDURE — 3008F BODY MASS INDEX DOCD: CPT | Mod: CPTII,S$GLB,, | Performed by: NURSE PRACTITIONER

## 2023-12-08 PROCEDURE — 3044F HG A1C LEVEL LT 7.0%: CPT | Mod: CPTII,S$GLB,, | Performed by: NURSE PRACTITIONER

## 2023-12-08 PROCEDURE — 1159F MED LIST DOCD IN RCRD: CPT | Mod: CPTII,S$GLB,, | Performed by: NURSE PRACTITIONER

## 2023-12-08 PROCEDURE — 99214 OFFICE O/P EST MOD 30 MIN: CPT | Mod: S$GLB,,, | Performed by: NURSE PRACTITIONER

## 2023-12-08 PROCEDURE — 3079F PR MOST RECENT DIASTOLIC BLOOD PRESSURE 80-89 MM HG: ICD-10-PCS | Mod: CPTII,S$GLB,, | Performed by: NURSE PRACTITIONER

## 2023-12-08 PROCEDURE — 3079F DIAST BP 80-89 MM HG: CPT | Mod: CPTII,S$GLB,, | Performed by: NURSE PRACTITIONER

## 2023-12-08 PROCEDURE — 3008F PR BODY MASS INDEX (BMI) DOCUMENTED: ICD-10-PCS | Mod: CPTII,S$GLB,, | Performed by: NURSE PRACTITIONER

## 2023-12-08 PROCEDURE — 99999 PR PBB SHADOW E&M-EST. PATIENT-LVL IV: CPT | Mod: PBBFAC,,, | Performed by: NURSE PRACTITIONER

## 2023-12-08 PROCEDURE — 99999 PR PBB SHADOW E&M-EST. PATIENT-LVL IV: ICD-10-PCS | Mod: PBBFAC,,, | Performed by: NURSE PRACTITIONER

## 2023-12-08 RX ORDER — AMOXICILLIN 500 MG
1 CAPSULE ORAL DAILY
COMMUNITY

## 2023-12-08 NOTE — PROGRESS NOTES
This dictation has been generated using Modal Fluency Dictation some phonetic errors may occur. Please contact author for clarification if needed.     Problem List Items Addressed This Visit       TIA (transient ischemic attack) - Primary     Other Visit Diagnoses       Other migraine without status migrainosus, not intractable                     Hospital follow-up TIA.  Patient taking aspirin and statin.  She has a follow-up appointment with Cardiology after the 1st of the year for intraventricular shuntin right to left.    Recommended that patient reschedule colonoscopy.  Not cleared for colonoscopy until after evaluation with Cardiology and clearance.    Does have migraines consider contributing to left-sided symptoms.  Patient had negative workup for demyelinization disease.  She does have some degenerative changes in the C-spine and follow-up with neuro for next week.    No follow-ups on file.    ________________________________________________________________  ________________________________________________________________      Chief Complaint   Patient presents with    Hospital Follow Up     History of present illness  This 43 y.o. presents today for complaint of hospital follow-up TIA.  Patient had left arm tingling which progressed to numbness.  Later she felt some heaviness.  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  No other symptoms were noted.  At the end of work she decided to go to the emergency department for evaluation.  Workup was normal CT MRI.  Her MRI for demyelinization disease was negative.  Incidental finding of intracardiac shunt right to left noted.  Discussed likely developmental defect.  No chest pain shortness O breath palpitations.  Taking aspirin and statin as directed.  Patient has an appointment with neuro next weekend Cardiology after the 1st of the year.        Past Medical History:   Diagnosis Date    Anxiety state, unspecified     Disturbance of salivary  secretion     Edema     GERD (gastroesophageal reflux disease)     Goiter, unspecified     Nonspecific elevation of levels of transaminase or lactic acid dehydrogenase (LDH)     Polyphagia(783.6)     Unspecified sleep apnea     Varices of other sites        Past Surgical History:   Procedure Laterality Date     SECTION      x 2    ECHOCARDIOGRAM,TRANSESOPHAGEAL N/A 2023    Procedure: Transesophageal echo (RAYRAY) intra-procedure log documentation;  Surgeon: Thuy Jane MD;  Location: [facility] MINIMALLY INVASIVE CARDIOLOGY;  Service: Cardiology;  Laterality: N/A;    ENDOMETRIAL ABLATION      OOPHORECTOMY Left     Dermoid cyst    TUBAL LIGATION         Family History   Problem Relation Age of Onset    Cancer Mother         thyroid    Hypertension Mother     Diabetes Father     Hypertension Father        Social History     Socioeconomic History    Marital status:    Tobacco Use    Smoking status: Never    Smokeless tobacco: Never   Substance and Sexual Activity    Alcohol use: Yes     Comment: occasionally    Drug use: No    Sexual activity: Yes     Partners: Male     Birth control/protection: See Surgical Hx     Comment:      Social Determinants of Health     Financial Resource Strain: Low Risk  (2023)    Overall Financial Resource Strain (CARDIA)     Difficulty of Paying Living Expenses: Not hard at all   Food Insecurity: No Food Insecurity (2023)    Hunger Vital Sign     Worried About Running Out of Food in the Last Year: Never true     Ran Out of Food in the Last Year: Never true   Transportation Needs: No Transportation Needs (2023)    PRAPARE - Transportation     Lack of Transportation (Medical): No     Lack of Transportation (Non-Medical): No   Physical Activity: Inactive (2023)    Exercise Vital Sign     Days of Exercise per Week: 0 days     Minutes of Exercise per Session: 0 min   Stress: Stress Concern Present (2023)    [employer] - Occupational Stress Questionnaire     Feeling of Stress : To some extent   Social Connections: Unknown (12/4/2023)    Social Connection and Isolation Panel [NHANES]     Frequency of Communication with Friends and Family: More than three times a week     Frequency of Social Gatherings with Friends and Family: Once a week     Active Member of Clubs or Organizations: No     Attends Club or Organization Meetings: Never     Marital Status:    Housing Stability: Low Risk  (12/4/2023)    Housing Stability Vital Sign     Unable to Pay for Housing in the Last Year: No     Number of Places Lived in the Last Year: 1     Unstable Housing in the Last Year: No       Current Outpatient Medications   Medication Sig Dispense Refill    ammonium lactate (LAC-HYDRIN) 12 % lotion Apply 1 g topically 2 (two) times daily as needed for Dry Skin.      aspirin (ECOTRIN) 81 MG EC tablet Take 1 tablet (81 mg total) by mouth once daily. 90 tablet 3    atorvastatin (LIPITOR) 40 MG tablet Take 1 tablet (40 mg total) by mouth once daily. 90 tablet 3    cycloSPORINE (RESTASIS) 0.05 % ophthalmic emulsion Place 1 drop into both eyes 2 (two) times daily.      FEROSUL 325 mg (65 mg iron) Tab tablet TAKE ONE TABLET BY MOUTH EVERY DAY 90 tablet 1    magnesium gluconate (MAG-G) 27 mg magnesium (500 mg) Tab tablet Take 27 mg by mouth once.      omega-3 fatty acids/fish oil (FISH OIL-OMEGA-3 FATTY ACIDS) 300-1,000 mg capsule Take 1 capsule by mouth once daily.      potassium citrate 99 mg Cap Take 1 capsule by mouth once daily.      clobetasoL (TEMOVATE) 0.05 % external solution Apply 1 mL topically 2 (two) times daily.       No current facility-administered medications for this visit.       Review of patient's allergies indicates:   Allergen Reactions    Sertraline      Other reaction(s): heartburn       Physical examination  Vitals Reviewed  /82 (BP Location: Right arm, Patient Position: Sitting, BP Method: Medium (Manual))   Pulse  "72   Temp 97.9 °F (36.6 °C) (Oral)   Resp 18   Ht 5' 6" (1.676 m)   Wt 112.4 kg (247 lb 12.8 oz)   LMP 11/06/2023   SpO2 97%   BMI 40.00 kg/m²  Body mass index is 40 kg/m².     BP Readings from Last 3 Encounters:   12/08/23 118/82   11/30/23 110/67   10/19/23 116/82       Wt Readings from Last 3 Encounters:   12/08/23 112.4 kg (247 lb 12.8 oz)   11/30/23 110.2 kg (243 lb)   10/19/23 112.3 kg (247 lb 9.2 oz)     Gen. Well-dressed well-nourished   Skin warm dry and intact.  No rashes noted.  Chest.  Respirations are even unlabored.  Lungs are clear to auscultation.  Cardiac regular rate and rhythm.  No chest wall adenopathy noted.  Neuro. Awake alert oriented x4.  Normal judgment and cognition noted.  Extremities no clubbing cyanosis or edema noted.     Call or return to clinic prn if these symptoms worsen or fail to improve as anticipated.      "

## 2023-12-08 NOTE — TELEPHONE ENCOUNTER
----- Message from Mehul Stephenson sent at 12/8/2023  9:13 AM CST -----  Regarding: Appointment  Contact: patient  Type:  Sooner Apoointment Request    Caller is requesting a sooner appointment.  Caller declined first available appointment listed below.  Caller will not accept being placed on the waitlist and is requesting a message be sent to doctor.  Name of Caller:patient  When is the first available appointment?01/01/2023  Symptoms:hospital f/u with MD Jane seeing patient in hospital and patient states appointment in one week is needed  Would the patient rather a call back or a response via [facility]? Please call to schedule  Best Call Back Number:[phone]  Additional Information:

## 2023-12-11 LAB — BSA FOR ECHO PROCEDURE: 2.27 M2

## 2023-12-27 ENCOUNTER — PATIENT MESSAGE (OUTPATIENT)
Dept: GASTROENTEROLOGY | Facility: CLINIC | Age: 43
End: 2023-12-27
Payer: COMMERCIAL

## 2024-01-04 ENCOUNTER — OFFICE VISIT (OUTPATIENT)
Dept: CARDIOLOGY | Facility: CLINIC | Age: 44
End: 2024-01-04
Payer: COMMERCIAL

## 2024-01-04 VITALS
SYSTOLIC BLOOD PRESSURE: 120 MMHG | HEIGHT: 66 IN | BODY MASS INDEX: 39.7 KG/M2 | WEIGHT: 247 LBS | OXYGEN SATURATION: 99 % | DIASTOLIC BLOOD PRESSURE: 80 MMHG | RESPIRATION RATE: 16 BRPM | HEART RATE: 76 BPM

## 2024-01-04 DIAGNOSIS — Q21.12 PATENT FORAMEN OVALE: ICD-10-CM

## 2024-01-04 DIAGNOSIS — Z01.818 PRE-OP EVALUATION: Primary | ICD-10-CM

## 2024-01-04 DIAGNOSIS — E78.1 HYPERTRIGLYCERIDEMIA: ICD-10-CM

## 2024-01-04 PROCEDURE — 99999 PR PBB SHADOW E&M-EST. PATIENT-LVL IV: CPT | Mod: PBBFAC,,, | Performed by: STUDENT IN AN ORGANIZED HEALTH CARE EDUCATION/TRAINING PROGRAM

## 2024-01-04 PROCEDURE — 1160F RVW MEDS BY RX/DR IN RCRD: CPT | Mod: CPTII,S$GLB,, | Performed by: STUDENT IN AN ORGANIZED HEALTH CARE EDUCATION/TRAINING PROGRAM

## 2024-01-04 PROCEDURE — 3074F SYST BP LT 130 MM HG: CPT | Mod: CPTII,S$GLB,, | Performed by: STUDENT IN AN ORGANIZED HEALTH CARE EDUCATION/TRAINING PROGRAM

## 2024-01-04 PROCEDURE — 3079F DIAST BP 80-89 MM HG: CPT | Mod: CPTII,S$GLB,, | Performed by: STUDENT IN AN ORGANIZED HEALTH CARE EDUCATION/TRAINING PROGRAM

## 2024-01-04 PROCEDURE — 3008F BODY MASS INDEX DOCD: CPT | Mod: CPTII,S$GLB,, | Performed by: STUDENT IN AN ORGANIZED HEALTH CARE EDUCATION/TRAINING PROGRAM

## 2024-01-04 PROCEDURE — 99214 OFFICE O/P EST MOD 30 MIN: CPT | Mod: S$GLB,,, | Performed by: STUDENT IN AN ORGANIZED HEALTH CARE EDUCATION/TRAINING PROGRAM

## 2024-01-04 PROCEDURE — 1159F MED LIST DOCD IN RCRD: CPT | Mod: CPTII,S$GLB,, | Performed by: STUDENT IN AN ORGANIZED HEALTH CARE EDUCATION/TRAINING PROGRAM

## 2024-01-04 RX ORDER — ROSUVASTATIN CALCIUM 20 MG/1
20 TABLET, COATED ORAL DAILY
Qty: 30 TABLET | Refills: 1 | Status: SHIPPED | OUTPATIENT
Start: 2024-01-04 | End: 2025-01-03

## 2024-01-04 NOTE — LETTER
2024    Uma Bangura  [address] [address]             [facility]  [address]  [facility] [address]  Phone: [phone]  Fax: [phone] Patient: Uma Bangura  : 1980  Referring Doctor: Teofilo Vu MD  Type of Surgery: C5-C6-C7 fusion  Date of Last Office Visit: 2024        Current Outpatient Medications   Medication Sig    aspirin (ECOTRIN) 81 MG EC tablet Take 1 tablet (81 mg total) by mouth once daily.    clobetasoL (TEMOVATE) 0.05 % external solution Apply 1 mL topically 2 (two) times daily.    cycloSPORINE (RESTASIS) 0.05 % ophthalmic emulsion Place 1 drop into both eyes 2 (two) times daily.    FEROSUL 325 mg (65 mg iron) Tab tablet TAKE ONE TABLET BY MOUTH EVERY DAY    magnesium gluconate (MAG-G) 27 mg magnesium (500 mg) Tab tablet Take 27 mg by mouth once.    omega-3 fatty acids/fish oil (FISH OIL-OMEGA-3 FATTY ACIDS) 300-1,000 mg capsule Take 1 capsule by mouth once daily.    potassium citrate 99 mg Cap Take 1 capsule by mouth once daily.    ammonium lactate (LAC-HYDRIN) 12 % lotion Apply 1 g topically 2 (two) times daily as needed for Dry Skin.    rosuvastatin (CRESTOR) 20 MG tablet Take 1 tablet (20 mg total) by mouth once daily.     No current facility-administered medications for this visit.                       This patient has been assessed for pre-op cardiac evaluation.    No major contraindications at this time from cardiac standpoint to proceed with noncardiac surgery.   Recommendations for antiplatelet/anticoagulant medications: Hold aspirin 5-7 days prior to surgery unless specified otherwise by neurology.    If you have any questions regarding the above, please contact my office at [phone].    Sincerely,

## 2024-01-04 NOTE — PROGRESS NOTES
Patient ID:  Uma Bangura  43 y.o.  female      Assessment:       1. Pre-op evaluation    2. Patent foramen ovale    3. Hypertriglyceridemia        Plan:       Will defer from referring for PFO closure at this time as her symptoms are not thought to be from TIA and more so from cervical spine changes.  No evidence of significant changes in cardiac chamber size or function from the shunt.  The patient is doing well from cardiac standpoint.  Able to perform more than 4 Mets without experiencing any angina or anginal equivalent symptoms.  No major contraindications at this time from cardiac standpoint to proceed with noncardiac surgery.  She reports muscle ache with Lipitor.  I will change it to Crestor and see if she is able to tolerate it better.  Counseled on heart healthy lifestyle including diet and exercise.      Subjective:     Chief Complaint   Patient presents with    Hospital Follow Up     She is needing clearance for Dr. Vu for neck surgery. He does not believe she had a TIA, all symptoms are coming from her neck       HPI:  Uma Bangura is a 43 y.o. female who presents for preop evaluation.  She was in the hospital in November 2023 with complaints left arm numbness/weakness.  MRI brain and CT head were unremarkable.  Initially there was suspicion for TIA.  Neurology evaluated the patient.  MRI cervical spine was also obtained which showed degenerative disc disease, spinal stenosis and radicular compression.  With a cervical spine MRI findings, her symptoms are thought to be coming more from the cervical spine changes and not TIA.  She has seen Dr. Vu (neurosurgery) who was planning on doing C5-C6-C7 fusion.  She had a transthoracic echocardiogram while in the hospital which showed evidence intracardiac shunt.  She then had a RAYRAY which showed a small patent foramen ovale.  She has normal left ventricular systolic and diastolic function, normal right ventricular function, normal cardiac  chamber sizes,  and no hemodynamically significant valvular abnormalities.    She reports doing well overall.  She is physically active.  She is able to climb more than 2 flights of stairs without experiencing any cardiac symptoms.  Denies any chest pain, shortness of breath, orthopnea, PND, palpitations, dizziness, syncope or near-syncope.  Has chronic swelling in her bilateral legs occasionally.  Denies any significant worsening from baseline in the recent times.      PREVIOUS CARDIAC TESTING/PROCEDURES HISTORY:  Most Recent Echocardiogram Results  Results for orders placed during the hospital encounter of 11/30/23    Transesophageal echo (RAYRAY)    Interpretation Summary    Left Ventricle: Normal wall motion. There is normal systolic function with a visually estimated ejection fraction of 60 - 65%.    Right Ventricle: Normal right ventricular cavity size. Systolic function is normal.    Left Atrium: Patent foramen ovale visualized with small left to right shunt by color flow doppler. Small right to left shunt with a small amount of bubbles crossing with valsalva on agitated saline study.    No evidence of intracardiac thrombus.    TTE 11/28/2023  Summary    There was 20 mL of intravenous agitated saline (bubble) used. Study is positive for intracardiac shunt that is right to left. Overall the study quality was adequate    Left Ventricle: The left ventricle is normal in size. Normal wall thickness. Normal wall motion. There is normal systolic function with a visually estimated ejection fraction of 55 - 60%. There is normal diastolic function.    Right Ventricle: Normal right ventricular cavity size. Wall thickness is normal. Right ventricle wall motion  is normal. Systolic function is normal.    Tricuspid Valve: There is mild regurgitation.    IVC/SVC: Normal venous pressure at 3 mmHg.      Most Recent EKG Results  11/27/2023  Normal sinus rhythm, 76 beats per minute, normal intervals, narrow QRS complex and no ST T  abnormalities.    The 10-year ASCVD risk score (Jennifer MAZARIEGOS, et al., 2019) is: 0.8%    Values used to calculate the score:      Age: 43 years      Sex: Female      Is Non- : No      Diabetic: No      Tobacco smoker: No      Systolic Blood Pressure: 120 mmHg      Is BP treated: No      HDL Cholesterol: 49 mg/dL      Total Cholesterol: 195 mg/dL    Review of patient's allergies indicates:   Allergen Reactions    Sertraline      Other reaction(s): heartburn       Past Medical History:   Diagnosis Date    Anxiety state, unspecified     Disturbance of salivary secretion     Edema     GERD (gastroesophageal reflux disease)     Goiter, unspecified     Nonspecific elevation of levels of transaminase or lactic acid dehydrogenase (LDH)     Polyphagia(783.6)     Unspecified sleep apnea     Varices of other sites      Past Surgical History:   Procedure Laterality Date     SECTION      x 2    ECHOCARDIOGRAM,TRANSESOPHAGEAL N/A 2023    Procedure: Transesophageal echo (RAYRAY) intra-procedure log documentation;  Surgeon: Thuy Jane MD;  Location: [facility] MINIMALLY INVASIVE CARDIOLOGY;  Service: Cardiology;  Laterality: N/A;    ENDOMETRIAL ABLATION      OOPHORECTOMY Left     Dermoid cyst    TUBAL LIGATION       Social History     Tobacco Use    Smoking status: Never    Smokeless tobacco: Never   Substance Use Topics    Alcohol use: Yes     Comment: occasionally    Drug use: No          REVIEW OF SYSTEMS  CONSTITUTIONAL: No chills, no fatigue, no fever.   EYES: No double vision, no blurred vision  NEURO: No headaches, no dizziness  RESPIRATORY: No cough, no shortness of breath, no wheezing.    CARDIOVASCULAR: See HPI   GI: No abdominal pain, no melena, no diarrhea, no nausea or vomiting.   : No  dysuria and frequency, no hematuria  SKIN: no bruising, no discoloration  ENDOCRINE: no polyphagia, no heat intolerance, no cold intolerance  PSYCHIATRIC: no depression, no anxiety, no memory  "loss  MUSCULOSKELETAL: no  neck pain, no muscle weakness,no back pain          Objective:        Vitals:    01/04/24 0757   BP: 120/80   Pulse: 76   Resp: 16       PHYSICAL EXAM  CONSTITUTIONAL: In no apparent distress  HEENT: Normocephalic. Pupils normal and conjunctivae normal. No pallor  NECK: No JVD  LUNGS: B/L air entry to the lungs, clear to auscultation. No rales, wheezing or rhonchi.  HEART: Regular rate and normal rhythm. Normal S1 and S2.  No murmur   ABDOMEN: soft, non-tender; bowel sounds normal  EXTREMITIES: No edema. Good palpable distal pulses.  NEURO: AAO X 3, no gross sensory or motor deficits  SKIN:  No rash  Psych:  Normal affect    Lab Results   Component Value Date    WBC 7.94 11/29/2023    HGB 13.9 11/29/2023    HCT 42.2 11/29/2023     11/29/2023    CHOL 195 11/27/2023    TRIG 322 (H) 11/27/2023    HDL 49 11/27/2023    ALT 31 11/29/2023    AST 24 11/29/2023     11/29/2023    K 4.3 11/29/2023     11/29/2023    CREATININE 1.1 11/29/2023    BUN 18 11/29/2023    CO2 27 11/29/2023    TSH 1.496 11/27/2023    INR 0.9 11/28/2023    HGBA1C 5.7 11/27/2023        @  Lab Results   Component Value Date    CHOL 195 11/27/2023    CHOL 167 09/02/2023    CHOL 179 07/26/2022     Lab Results   Component Value Date    HDL 49 11/27/2023    HDL 46 09/02/2023    HDL 58 07/26/2022     Lab Results   Component Value Date    LDLCALC 81.6 11/27/2023    LDLCALC 95.6 09/02/2023    LDLCALC 88.0 07/26/2022     No results found for: "DLDL"  Lab Results   Component Value Date    TRIG 322 (H) 11/27/2023    TRIG 127 09/02/2023    TRIG 165 (H) 07/26/2022       f1   Lab Results   Component Value Date    CHOLHDL 25.1 11/27/2023    CHOLHDL 27.5 09/02/2023    CHOLHDL 32.4 07/26/2022            Current Outpatient Medications   Medication Instructions    ammonium lactate (LAC-HYDRIN) 1 g, Topical (Top), 2 times daily PRN    aspirin (ECOTRIN) 81 mg, Oral, Daily    clobetasoL (TEMOVATE) 0.05 % external solution 1 mL, " Topical (Top), 2 times daily    cycloSPORINE (RESTASIS) 0.05 % ophthalmic emulsion 1 drop, Both Eyes, 2 times daily    FEROSUL 325 mg (65 mg iron) Tab tablet TAKE ONE TABLET BY MOUTH EVERY DAY    magnesium gluconate (MAG-G) 27 mg, Oral, Once    omega-3 fatty acids/fish oil (FISH OIL-OMEGA-3 FATTY ACIDS) 300-1,000 mg capsule 1 capsule, Oral, Daily    potassium citrate 99 mg Cap 1 capsule, Oral, Daily    rosuvastatin (CRESTOR) 20 mg, Oral, Daily     @[facility]@    All pertinent labs, imaging, and EKGs reviewed.  Patient's most recent EKG tracing was personally interpreted by this provider.    Problem List Items Addressed This Visit    None  Visit Diagnoses       Pre-op evaluation    -  Primary    Patent foramen ovale        Hypertriglyceridemia                Follow up in about 1 year (around 1/4/2025).

## 2024-02-26 PROBLEM — G45.9 TIA (TRANSIENT ISCHEMIC ATTACK): Status: RESOLVED | Noted: 2023-11-27 | Resolved: 2024-02-26

## 2024-04-12 ENCOUNTER — PATIENT MESSAGE (OUTPATIENT)
Dept: CARDIOLOGY | Facility: CLINIC | Age: 44
End: 2024-04-12
Payer: COMMERCIAL

## 2024-04-12 ENCOUNTER — OFFICE VISIT (OUTPATIENT)
Dept: CARDIOLOGY | Facility: CLINIC | Age: 44
End: 2024-04-12
Payer: COMMERCIAL

## 2024-04-12 VITALS
DIASTOLIC BLOOD PRESSURE: 80 MMHG | OXYGEN SATURATION: 97 % | HEART RATE: 78 BPM | BODY MASS INDEX: 38.89 KG/M2 | WEIGHT: 242 LBS | SYSTOLIC BLOOD PRESSURE: 126 MMHG | HEIGHT: 66 IN | RESPIRATION RATE: 16 BRPM

## 2024-04-12 DIAGNOSIS — Z13.6 ENCOUNTER FOR SCREENING FOR CARDIOVASCULAR DISORDERS: Primary | ICD-10-CM

## 2024-04-12 DIAGNOSIS — Q21.12 PATENT FORAMEN OVALE: ICD-10-CM

## 2024-04-12 DIAGNOSIS — R03.0 ELEVATED BLOOD-PRESSURE READING WITHOUT DIAGNOSIS OF HYPERTENSION: ICD-10-CM

## 2024-04-12 PROCEDURE — 3079F DIAST BP 80-89 MM HG: CPT | Mod: CPTII,S$GLB,, | Performed by: STUDENT IN AN ORGANIZED HEALTH CARE EDUCATION/TRAINING PROGRAM

## 2024-04-12 PROCEDURE — 99214 OFFICE O/P EST MOD 30 MIN: CPT | Mod: S$GLB,,, | Performed by: STUDENT IN AN ORGANIZED HEALTH CARE EDUCATION/TRAINING PROGRAM

## 2024-04-12 PROCEDURE — 3008F BODY MASS INDEX DOCD: CPT | Mod: CPTII,S$GLB,, | Performed by: STUDENT IN AN ORGANIZED HEALTH CARE EDUCATION/TRAINING PROGRAM

## 2024-04-12 PROCEDURE — 1159F MED LIST DOCD IN RCRD: CPT | Mod: CPTII,S$GLB,, | Performed by: STUDENT IN AN ORGANIZED HEALTH CARE EDUCATION/TRAINING PROGRAM

## 2024-04-12 PROCEDURE — 99999 PR PBB SHADOW E&M-EST. PATIENT-LVL IV: CPT | Mod: PBBFAC,,, | Performed by: STUDENT IN AN ORGANIZED HEALTH CARE EDUCATION/TRAINING PROGRAM

## 2024-04-12 PROCEDURE — 1160F RVW MEDS BY RX/DR IN RCRD: CPT | Mod: CPTII,S$GLB,, | Performed by: STUDENT IN AN ORGANIZED HEALTH CARE EDUCATION/TRAINING PROGRAM

## 2024-04-12 PROCEDURE — 3074F SYST BP LT 130 MM HG: CPT | Mod: CPTII,S$GLB,, | Performed by: STUDENT IN AN ORGANIZED HEALTH CARE EDUCATION/TRAINING PROGRAM

## 2024-04-12 NOTE — PROGRESS NOTES
Patient ID:  Uma Bangura  44 y.o.  female      Defer PFO closure at this time as her symptoms she had during hospitalization are not thought to be from TIA and more so from cervical spine changes.  No evidence of significant changes in cardiac chamber size or function from the shunt.     Assessment:       1. Encounter for screening for cardiovascular disorders    2. Patent foramen ovale    3. Elevated blood-pressure reading without diagnosis of hypertension      No need for PFO closure at this time     Plan:     She reported muscle ache with Lipitor which was switched to Crestor however unfortunately she experienced the same symptoms on Crestor and stopped taking it a few months ago.  Continue aspirin 81 mg daily.   Counseled on heart healthy lifestyle including diet and exercise.         Subjective:     Chief Complaint   Patient presents with    Follow-up     She had a recent fall yesterday afternoon. She was in the shower and ? Syncope. She has been having elevated bp and headache with ringing in her ears.     Hyperlipidemia     She has stopped the crestor, she has been having muscle aches and pain       HPI:  Uma Bangura is a 44 y.o. female who presents for follow up. She had cervical spine surgery. Reports recovering satisfactorily.     She was in the hospital in November 2023 with complaints left arm numbness/weakness.  MRI brain and CT head were unremarkable.  Initially there was suspicion for TIA.  Neurology evaluated the patient.  MRI cervical spine was also obtained which showed degenerative disc disease, spinal stenosis and radicular compression.  With a cervical spine MRI findings, her symptoms are thought to be coming more from the cervical spine changes and not TIA.  She was seen by Dr. Vu (neurosurgery) who recommended outpatient C5-C6-C7 fusion which she underwent recently,   She had a transthoracic echocardiogram while in the hospital which showed evidence intracardiac shunt.  She  then had a RAYRAY which showed a small patent foramen ovale.  She has normal left ventricular systolic and diastolic function, normal right ventricular function, normal cardiac chamber sizes,  and no hemodynamically significant valvular abnormalities.     She presents today for follow. Reports doing well overall. She started doing post spine surgery PT last week. She had a mechanical fall yesterday in the shower. She slipped and fell.  Denies feeling dizzy, having palpitations, lose of obnoxiousness or any other symptoms. Denies any chest pain, shortness of breath, orthopnea, PND, palpitations, dizziness, syncope or near-syncope.  Has chronic swelling in her bilateral legs occasionally.  Denies any significant worsening from baseline in the recent times. Reports some ringing in the ear. She stopped taking her Crestor due to muscle ache.       PREVIOUS CARDIAC TESTING/PROCEDURES HISTORY:  Most Recent Echocardiogram Results  Results for orders placed during the hospital encounter of 11/27/23    Transesophageal echo (RAYRAY)    Interpretation Summary    Left Ventricle: Normal wall motion. There is normal systolic function with a visually estimated ejection fraction of 60 - 65%.    Right Ventricle: Normal right ventricular cavity size. Systolic function is normal.    Left Atrium: Patent foramen ovale visualized with small left to right shunt by color flow doppler. Small right to left shunt with a small amount of bubbles crossing with valsalva on agitated saline study.    No evidence of intracardiac thrombus.      Most Recent Stress Test Results  No results found for this or any previous visit.      Most Recent Cardiac PET Stress Test Results  No results found for this or any previous visit.      Most Recent Cardiovascular Angiogram results  Results for orders placed during the hospital encounter of 11/27/23    Intra-Procedure Documentation    Narrative  RAYRAY performed in the Invasive Lab  - See Procedure Log link below for  nursing documentation  - See RAYRAY order on Card Proc Tab for physician findings      Most Recent EKG Results  Results for orders placed or performed during the hospital encounter of 23   ECG 12 lead    Collection Time: 23  4:37 PM    Narrative    Test Reason : R29.818,    Vent. Rate : 076 BPM     Atrial Rate : 076 BPM     P-R Int : 146 ms          QRS Dur : 088 ms      QT Int : 396 ms       P-R-T Axes : 068 061 058 degrees     QTc Int : 445 ms    Normal sinus rhythm  Normal ECG  No previous ECGs available  Confirmed by Zoey SANCHEZ, Tevin BEJARANO ([id number]) on 2023 8:59:05 PM    Referred By: [email or web address]   SELF           Confirmed By:Tevin Greer MD       The 10-year ASCVD risk score (Jennifer MAZARIEGOS, et al., 2019) is: 1%    Values used to calculate the score:      Age: 44 years      Sex: Female      Is Non- : No      Diabetic: No      Tobacco smoker: No      Systolic Blood Pressure: 126 mmHg      Is BP treated: No      HDL Cholesterol: 49 mg/dL      Total Cholesterol: 195 mg/dL    Review of patient's allergies indicates:   Allergen Reactions    Sertraline      Other reaction(s): heartburn       Past Medical History:   Diagnosis Date    Anxiety state, unspecified     Disturbance of salivary secretion     Edema     GERD (gastroesophageal reflux disease)     Goiter, unspecified     Nonspecific elevation of levels of transaminase or lactic acid dehydrogenase (LDH)     Polyphagia(783.6)     Unspecified sleep apnea     Varices of other sites      Past Surgical History:   Procedure Laterality Date     SECTION      x 2    ECHOCARDIOGRAM,TRANSESOPHAGEAL N/A 2023    Procedure: Transesophageal echo (RAYRAY) intra-procedure log documentation;  Surgeon: Thuy Jane MD;  Location: [facility] MINIMALLY INVASIVE CARDIOLOGY;  Service: Cardiology;  Laterality: N/A;    ENDOMETRIAL ABLATION      OOPHORECTOMY Left     Dermoid cyst    TUBAL LIGATION       Social History     Tobacco Use    Smoking  status: Never    Smokeless tobacco: Never   Substance Use Topics    Alcohol use: Yes     Comment: occasionally    Drug use: No          REVIEW OF SYSTEMS  CONSTITUTIONAL: No chills, no fatigue, no fever.   EYES: No double vision, no blurred vision  NEURO: No headaches, no dizziness  RESPIRATORY: No cough, no wheezing.    CARDIOVASCULAR: See HPI   GI: No abdominal pain, no melena, no diarrhea, no nausea or vomiting.   : No  dysuria and frequency, no hematuria  SKIN: no bruising, no discoloration  ENDOCRINE: no polyphagia, no heat intolerance, no cold intolerance  PSYCHIATRIC: no depression, no anxiety, no memory loss  MUSCULOSKELETAL: no  neck pain, no muscle weakness,no back pain          Objective:        Vitals:    04/12/24 1028   BP: 126/80   Pulse: 78   Resp: 16       PHYSICAL EXAM  CONSTITUTIONAL: In no apparent distress  HEENT: Normocephalic. Pupils normal and conjunctivae normal. No pallor  NECK: No JVD  LUNGS: B/L air entry to the lungs, clear to auscultation. No rales, wheezing or rhonchi.  HEART: Normal rate and regular rhythm. Normal S1 and S2.  No murmur   ABDOMEN: soft, non-tender; bowel sounds normal  EXTREMITIES: No edema. Good palpable distal pulses.  NEURO: AAO X 3, no gross sensory or motor deficits  SKIN:  No rash  Psych:  Normal affect    Lab Results   Component Value Date    WBC 7.94 11/29/2023    HGB 13.9 11/29/2023    HCT 42.2 11/29/2023     11/29/2023    CHOL 195 11/27/2023    TRIG 322 (H) 11/27/2023    HDL 49 11/27/2023    ALT 31 11/29/2023    AST 24 11/29/2023     11/29/2023    K 4.3 11/29/2023     11/29/2023    CREATININE 1.1 11/29/2023    BUN 18 11/29/2023    CO2 27 11/29/2023    TSH 1.496 11/27/2023    INR 0.9 11/28/2023    HGBA1C 5.7 11/27/2023        @  Lab Results   Component Value Date    CHOL 195 11/27/2023    CHOL 167 09/02/2023    CHOL 179 07/26/2022     Lab Results   Component Value Date    HDL 49 11/27/2023    HDL 46 09/02/2023    HDL 58 07/26/2022     Lab  "Results   Component Value Date    LDLCALC 81.6 11/27/2023    LDLCALC 95.6 09/02/2023    LDLCALC 88.0 07/26/2022     No results found for: "DLDL"  Lab Results   Component Value Date    TRIG 322 (H) 11/27/2023    TRIG 127 09/02/2023    TRIG 165 (H) 07/26/2022       f1   Lab Results   Component Value Date    CHOLHDL 25.1 11/27/2023    CHOLHDL 27.5 09/02/2023    CHOLHDL 32.4 07/26/2022            Current Outpatient Medications   Medication Instructions    ammonium lactate (LAC-HYDRIN) 1 g, Topical (Top), 2 times daily PRN    aspirin (ECOTRIN) 81 mg, Oral, Daily    clobetasoL (TEMOVATE) 0.05 % external solution 1 mL, Topical (Top), 2 times daily    cycloSPORINE (RESTASIS) 0.05 % ophthalmic emulsion 1 drop, Both Eyes, 2 times daily    FEROSUL 325 mg (65 mg iron) Tab tablet TAKE ONE TABLET BY MOUTH EVERY DAY    magnesium gluconate (MAG-G) 27 mg, Oral, Once    omega-3 fatty acids/fish oil (FISH OIL-OMEGA-3 FATTY ACIDS) 300-1,000 mg capsule 1 capsule, Oral, Daily    potassium citrate 99 mg Cap 1 capsule, Oral, Daily    ubrogepant (UBRELVY) 100 mg tablet take 1 tablet by mouth as needed may take 2nd dose at least 2 hours after first does as needed       Medication List with Changes/Refills   Current Medications    AMMONIUM LACTATE (LAC-HYDRIN) 12 % LOTION    Apply 1 g topically 2 (two) times daily as needed for Dry Skin.    ASPIRIN (ECOTRIN) 81 MG EC TABLET    Take 1 tablet (81 mg total) by mouth once daily.    CLOBETASOL (TEMOVATE) 0.05 % EXTERNAL SOLUTION    Apply 1 mL topically 2 (two) times daily.    CYCLOSPORINE (RESTASIS) 0.05 % OPHTHALMIC EMULSION    Place 1 drop into both eyes 2 (two) times daily.    FEROSUL 325 MG (65 MG IRON) TAB TABLET    TAKE ONE TABLET BY MOUTH EVERY DAY    MAGNESIUM GLUCONATE (MAG-G) 27 MG MAGNESIUM (500 MG) TAB TABLET    Take 27 mg by mouth once.    OMEGA-3 FATTY ACIDS/FISH OIL (FISH OIL-OMEGA-3 FATTY ACIDS) 300-1,000 MG CAPSULE    Take 1 capsule by mouth once daily.    POTASSIUM CITRATE 99 MG " CAP    Take 1 capsule by mouth once daily.    UBROGEPANT (UBRELVY) 100 MG TABLET    take 1 tablet by mouth as needed may take 2nd dose at least 2 hours after first does as needed   Discontinued Medications    ROSUVASTATIN (CRESTOR) 20 MG TABLET    Take 1 tablet (20 mg total) by mouth once daily.               All pertinent labs, imaging, and EKGs reviewed.  Patient's most recent EKG tracing was personally interpreted by this provider.    Problem List Items Addressed This Visit    None  Visit Diagnoses       Encounter for screening for cardiovascular disorders    -  Primary    Patent foramen ovale        Elevated blood-pressure reading without diagnosis of hypertension                Follow up in about 6 months (around 10/12/2024).

## 2024-06-28 DIAGNOSIS — D50.9 IRON DEFICIENCY ANEMIA, UNSPECIFIED IRON DEFICIENCY ANEMIA TYPE: ICD-10-CM

## 2024-06-28 RX ORDER — FERROUS SULFATE 325(65) MG
TABLET ORAL
Qty: 90 TABLET | Refills: 1 | Status: SHIPPED | OUTPATIENT
Start: 2024-06-28

## 2024-07-15 ENCOUNTER — PATIENT MESSAGE (OUTPATIENT)
Dept: ADMINISTRATIVE | Facility: HOSPITAL | Age: 44
End: 2024-07-15
Payer: COMMERCIAL

## 2024-07-17 ENCOUNTER — PATIENT OUTREACH (OUTPATIENT)
Dept: ADMINISTRATIVE | Facility: HOSPITAL | Age: 44
End: 2024-07-17
Payer: COMMERCIAL

## 2024-07-17 DIAGNOSIS — Z12.31 ENCOUNTER FOR SCREENING MAMMOGRAM FOR BREAST CANCER: Primary | ICD-10-CM

## 2024-07-17 NOTE — PROGRESS NOTES
Population Health Chart Review & Patient Outreach Details      Additional [facility] Health Notes:               Updates Requested / Reviewed:      Updated Care Coordination Note, Care Everywhere, , and External Sources: [facility], [facility], and [facility]         Health Maintenance Topics Overdue:      VB Score: 1     Colon Cancer Screening                       Health Maintenance Topic(s) Outreach Outcomes & Actions Taken:    Breast Cancer Screening - Outreach Outcomes & Actions Taken  : Mammogram Order Placed and patient outreach    Colorectal Cancer Screening - Outreach Outcomes & Actions Taken  : patient outreach

## 2024-07-29 ENCOUNTER — HOSPITAL ENCOUNTER (OUTPATIENT)
Dept: RADIOLOGY | Facility: HOSPITAL | Age: 44
Discharge: HOME | End: 2024-07-29
Attending: NEUROLOGICAL SURGERY
Payer: COMMERCIAL

## 2024-07-29 ENCOUNTER — HOSPITAL ENCOUNTER (OUTPATIENT)
Dept: RADIOLOGY | Facility: HOSPITAL | Age: 44
Discharge: HOME | End: 2024-07-29
Attending: FAMILY MEDICINE
Payer: COMMERCIAL

## 2024-07-29 DIAGNOSIS — Z12.31 ENCOUNTER FOR SCREENING MAMMOGRAM FOR BREAST CANCER: ICD-10-CM

## 2024-07-29 DIAGNOSIS — M54.12 CERVICAL RADICULITIS: Primary | ICD-10-CM

## 2024-07-29 DIAGNOSIS — M54.12 CERVICAL RADICULITIS: ICD-10-CM

## 2024-07-29 PROCEDURE — 72040 X-RAY EXAM NECK SPINE 2-3 VW: CPT | Mod: TC

## 2024-07-29 PROCEDURE — 72040 X-RAY EXAM NECK SPINE 2-3 VW: CPT | Mod: 26,,, | Performed by: RADIOLOGY

## 2024-07-29 PROCEDURE — 77067 SCR MAMMO BI INCL CAD: CPT | Mod: 26,,, | Performed by: RADIOLOGY

## 2024-07-29 PROCEDURE — 77063 BREAST TOMOSYNTHESIS BI: CPT | Mod: TC

## 2024-07-29 PROCEDURE — 77063 BREAST TOMOSYNTHESIS BI: CPT | Mod: 26,,, | Performed by: RADIOLOGY

## 2024-07-29 PROCEDURE — 77067 SCR MAMMO BI INCL CAD: CPT | Mod: TC

## 2024-07-30 ENCOUNTER — PATIENT MESSAGE (OUTPATIENT)
Dept: OBSTETRICS AND GYNECOLOGY | Facility: CLINIC | Age: 44
End: 2024-07-30
Payer: COMMERCIAL

## 2024-07-30 ENCOUNTER — PATIENT MESSAGE (OUTPATIENT)
Dept: FAMILY MEDICINE | Facility: CLINIC | Age: 44
End: 2024-07-30
Payer: COMMERCIAL

## 2024-07-30 ENCOUNTER — TELEPHONE (OUTPATIENT)
Dept: OBSTETRICS AND GYNECOLOGY | Facility: CLINIC | Age: 44
End: 2024-07-30
Payer: COMMERCIAL

## 2024-07-30 DIAGNOSIS — R92.8 ABNORMAL MAMMOGRAM: Primary | ICD-10-CM

## 2024-07-31 DIAGNOSIS — R92.8 ABNORMAL MAMMOGRAM OF LEFT BREAST: Primary | ICD-10-CM

## 2024-08-27 ENCOUNTER — OFFICE VISIT (OUTPATIENT)
Dept: OBSTETRICS AND GYNECOLOGY | Facility: CLINIC | Age: 44
End: 2024-08-27
Payer: COMMERCIAL

## 2024-08-27 VITALS
HEIGHT: 66 IN | DIASTOLIC BLOOD PRESSURE: 82 MMHG | WEIGHT: 236.13 LBS | BODY MASS INDEX: 37.95 KG/M2 | SYSTOLIC BLOOD PRESSURE: 126 MMHG | HEART RATE: 81 BPM

## 2024-08-27 DIAGNOSIS — Z01.419 WELL WOMAN EXAM WITH ROUTINE GYNECOLOGICAL EXAM: Primary | ICD-10-CM

## 2024-08-27 DIAGNOSIS — Z12.4 SCREENING FOR MALIGNANT NEOPLASM OF CERVIX: ICD-10-CM

## 2024-08-27 PROCEDURE — 3079F DIAST BP 80-89 MM HG: CPT | Mod: CPTII,S$GLB,, | Performed by: OBSTETRICS & GYNECOLOGY

## 2024-08-27 PROCEDURE — 1159F MED LIST DOCD IN RCRD: CPT | Mod: CPTII,S$GLB,, | Performed by: OBSTETRICS & GYNECOLOGY

## 2024-08-27 PROCEDURE — 3074F SYST BP LT 130 MM HG: CPT | Mod: CPTII,S$GLB,, | Performed by: OBSTETRICS & GYNECOLOGY

## 2024-08-27 PROCEDURE — 99999 PR PBB SHADOW E&M-EST. PATIENT-LVL III: CPT | Mod: PBBFAC,,, | Performed by: OBSTETRICS & GYNECOLOGY

## 2024-08-27 PROCEDURE — 99396 PREV VISIT EST AGE 40-64: CPT | Mod: S$GLB,,, | Performed by: OBSTETRICS & GYNECOLOGY

## 2024-08-27 PROCEDURE — 3008F BODY MASS INDEX DOCD: CPT | Mod: CPTII,S$GLB,, | Performed by: OBSTETRICS & GYNECOLOGY

## 2024-08-27 NOTE — PROGRESS NOTES
SUBJECTIVE     Chief Complaint   Patient presents with    Well Woman       History and Physical:  No LMP recorded.    Contraception: Bilateral tubal sterilization (previous ablation)    Date: 2024    Uma Bangura is a 44 y.o.  who presents today for her routine annual GYN exam.     The patient has no Gynecology complaints today.  No pelvic pain and occasional spotting secondary to previous ablation.    She does report some issues with bloating throughout the day but is planning a colonoscopy in October.  She does not feel the bloating is related to gyn issues.    Pap smear history:  No history of abnormal Pap smears.    Last Pap smear: 10/2023    Mammogram: Up-to-date 2024 with follow up study scheduled for 2024  Colon cancer screening:  Up-to-date planning for 2024  Personal or family history of bleeding or blood clotting disorders:  Negative     Family history:  Breast cancer:  Negative   Colon cancer:  Negative  Gyn related cancer:  Negative     Since her last evaluation the patient has undergone cervical spine fusion surgery.        Allergies:   Review of patient's allergies indicates:   Allergen Reactions    Sertraline      Other reaction(s): heartburn       Past Medical History:   Diagnosis Date    Anxiety state, unspecified     Disturbance of salivary secretion     Edema     GERD (gastroesophageal reflux disease)     Goiter, unspecified     Nonspecific elevation of levels of transaminase or lactic acid dehydrogenase (LDH)     Polyphagia(783.6)     Unspecified sleep apnea     Varices of other sites        Past Surgical History:   Procedure Laterality Date     SECTION      x 2    ECHOCARDIOGRAM,TRANSESOPHAGEAL N/A 2023    Procedure: Transesophageal echo (RAYRAY) intra-procedure log documentation;  Surgeon: Thuy Jane MD;  Location: [facility] MINIMALLY INVASIVE CARDIOLOGY;  Service: Cardiology;  Laterality: N/A;    ENDOMETRIAL ABLATION       OOPHORECTOMY Left     Dermoid cyst    TUBAL LIGATION         MEDS:   Current Outpatient Medications:     ammonium lactate (LAC-HYDRIN) 12 % lotion, Apply 1 g topically 2 (two) times daily as needed for Dry Skin., Disp: , Rfl:     aspirin (ECOTRIN) 81 MG EC tablet, Take 1 tablet (81 mg total) by mouth once daily., Disp: 90 tablet, Rfl: 3    clobetasoL (TEMOVATE) 0.05 % external solution, Apply 1 mL topically 2 (two) times daily., Disp: , Rfl:     cycloSPORINE (RESTASIS) 0.05 % ophthalmic emulsion, Place 1 drop into both eyes 2 (two) times daily., Disp: , Rfl:     ferrous sulfate (FEOSOL) 325 mg (65 mg iron) Tab tablet, TAKE ONE TABLET BY MOUTH EVERY DAY, Disp: 90 tablet, Rfl: 1    magnesium gluconate (MAG-G) 27 mg magnesium (500 mg) Tab tablet, Take 27 mg by mouth once., Disp: , Rfl:     omega-3 fatty acids/fish oil (FISH OIL-OMEGA-3 FATTY ACIDS) 300-1,000 mg capsule, Take 1 capsule by mouth once daily., Disp: , Rfl:     potassium citrate 99 mg Cap, Take 1 capsule by mouth once daily., Disp: , Rfl:     ubrogepant (UBRELVY) 100 mg tablet, take 1 tablet by mouth as needed may take 2nd dose at least 2 hours after first does as needed, Disp: 16 tablet, Rfl: 5     OB History          2    Para   2    Term   2            AB        Living   2         SAB        IAB        Ectopic        Multiple        Live Births   2           Obstetric Comments   Caesarean section x2               Age of Menarche:11  Age at first pregnancy:24   Age at first live birth:25  Number of months breastfeeding:    Age at Menopause:    Comments:       Social History     Tobacco Use    Smoking status: Never    Smokeless tobacco: Never   Substance Use Topics    Alcohol use: Yes     Comment: occasionally    Drug use: No       Family History   Problem Relation Name Age of Onset    Endometriosis Maternal Grandmother      Diabetes Father      Hypertension Father      Endometriosis Mother      Cancer Mother          thyroid    Hypertension  "Mother      Breast cancer Neg Hx      Uterine cancer Neg Hx      Cervical cancer Neg Hx      Ovarian cancer Neg Hx         Past medical and surgical history reviewed.   I have reviewed the patient's medical history in detail and updated the computerized patient record.    Review of Systems (at today's evaluation)  Review of Systems   Constitutional:  Negative for fever and unexpected weight change.   HENT:  Negative for congestion, ear pain, nosebleeds, sinus pain and sore throat.    Eyes: Negative.    Respiratory:  Negative for cough and shortness of breath.    Cardiovascular:  Negative for chest pain and palpitations.   Gastrointestinal:  Negative for constipation, diarrhea, nausea and vomiting.   Endocrine: Negative.    Genitourinary:  Negative for dysuria, frequency, hematuria and urgency.        Gyn as per HPI   Musculoskeletal:  Negative for arthralgias and myalgias.   Skin:  Negative for rash.   Neurological:  Negative for weakness and headaches.   Psychiatric/Behavioral:  The patient is not nervous/anxious.         Physical Exam:   /82 (BP Location: Right arm, Patient Position: Sitting, BP Method: Medium (Manual))   Pulse 81   Ht 5' 6" (1.676 m)   Wt 107.1 kg (236 lb 1.8 oz)   BMI 38.11 kg/m²     Physical Exam:   Constitutional: She appears well-developed and well-nourished.    HENT:   Head: Normocephalic.     Neck: No thyroid mass present.    Cardiovascular:  Normal rate.             Pulmonary/Chest: Effort normal. Right breast exhibits no mass, no nipple discharge and no skin change. Left breast exhibits no mass, no nipple discharge and no skin change.        Abdominal: Soft. There is no abdominal tenderness.     Genitourinary:    Inguinal canal, vagina, uterus, right adnexa and left adnexa normal.      Pelvic exam was performed with patient supine.   The external female genitalia was normal.   No external genitalia lesions identified,Cervix is normal. Right adnexum displays no mass and no " tenderness. Left adnexum displays no mass and no tenderness. No tenderness or bleeding in the vagina. Uterus is not tender. Normal urethral meatus.Urethra findings: no tendernessBladder findings: no bladder tenderness   Genitourinary Comments: Chaperone (female medical assistant) present throughout physical exam.             Musculoskeletal:      Right lower leg: No edema.      Left lower leg: No edema.      Lymphadenopathy:     She has no cervical adenopathy. No inguinal adenopathy noted on the right or left side.    Neurological: She is alert.   No gross defects noted    Skin: Skin is warm and dry.    Psychiatric: Mood normal.        Assessment:        1. Well woman exam with routine gynecological exam    2. Screening for malignant neoplasm of cervix         Plan:      Well woman exam with routine gynecological exam    Screening for malignant neoplasm of cervix  -     HPV High Risk Genotypes, PCR  -     Liquid-Based Pap Smear, Screening       Follow up for as needed / for any GYN related issues.     The above was reviewed and discussed with the patient.    Annual exam and screening issues based on the patient's age, medical history and family history were reviewed / discussed.  Routine health maintenance issues were reviewed and discussed.      From a gynecologic standpoint the patient is currently doing well without complaints.   We discussed generalized bloating and in the event that the patient's colonoscopy and GI workup evaluation are negative and she has gyn concerns she will notify the office and at that time will proceed with pelvic ultrasound.    The patient's questions were answered, and she is in agreement with the current plan.     Zane Orona MD  Department OBGYN  [facility]

## 2024-09-13 ENCOUNTER — HOSPITAL ENCOUNTER (OUTPATIENT)
Dept: RADIOLOGY | Facility: HOSPITAL | Age: 44
Discharge: HOME | End: 2024-09-13
Attending: OBSTETRICS & GYNECOLOGY
Payer: COMMERCIAL

## 2024-09-13 DIAGNOSIS — R92.8 ABNORMAL MAMMOGRAM: ICD-10-CM

## 2024-09-13 PROCEDURE — 76642 ULTRASOUND BREAST LIMITED: CPT | Mod: TC,PO,LT

## 2024-09-13 PROCEDURE — 77062 BREAST TOMOSYNTHESIS BI: CPT | Mod: TC,PO

## 2024-09-13 PROCEDURE — 76642 ULTRASOUND BREAST LIMITED: CPT | Mod: 26,LT,, | Performed by: RADIOLOGY

## 2024-09-13 PROCEDURE — 77066 DX MAMMO INCL CAD BI: CPT | Mod: 26,,, | Performed by: RADIOLOGY

## 2024-09-13 PROCEDURE — 77062 BREAST TOMOSYNTHESIS BI: CPT | Mod: 26,,, | Performed by: RADIOLOGY

## 2024-09-16 DIAGNOSIS — R92.8 ABNORMAL MAMMOGRAM: Primary | ICD-10-CM

## 2024-11-12 ENCOUNTER — PATIENT MESSAGE (OUTPATIENT)
Dept: ADMINISTRATIVE | Facility: HOSPITAL | Age: 44
End: 2024-11-12
Payer: COMMERCIAL

## 2024-11-13 ENCOUNTER — PATIENT OUTREACH (OUTPATIENT)
Dept: ADMINISTRATIVE | Facility: HOSPITAL | Age: 44
End: 2024-11-13
Payer: COMMERCIAL

## 2024-11-13 DIAGNOSIS — Z12.11 SCREENING FOR COLON CANCER: Primary | ICD-10-CM

## 2024-11-13 NOTE — PROGRESS NOTES
Population Health Chart Review & Patient Outreach Details      Additional [facility] Health Notes:        CAMPAIGN- Preventative Care Screening       Updates Requested / Reviewed:        Health Maintenance Topics Overdue:      [facility] Score: 1     Colon Cancer Screening                       Health Maintenance Topic(s) Outreach Outcomes & Actions Taken:    Colorectal Cancer Screening - Outreach Outcomes & Actions Taken  : Colonoscopy Case Request / Referral / Home Test Order Placed and will forward msg to schedule cscope

## 2024-12-23 ENCOUNTER — ANESTHESIA EVENT (OUTPATIENT)
Dept: ENDOSCOPY | Facility: HOSPITAL | Age: 44
End: 2024-12-23
Payer: COMMERCIAL

## 2024-12-23 ENCOUNTER — HOSPITAL ENCOUNTER (OUTPATIENT)
Facility: HOSPITAL | Age: 44
Discharge: HOME | End: 2024-12-23
Attending: STUDENT IN AN ORGANIZED HEALTH CARE EDUCATION/TRAINING PROGRAM | Admitting: STUDENT IN AN ORGANIZED HEALTH CARE EDUCATION/TRAINING PROGRAM
Payer: COMMERCIAL

## 2024-12-23 ENCOUNTER — ANESTHESIA (OUTPATIENT)
Dept: ENDOSCOPY | Facility: HOSPITAL | Age: 44
End: 2024-12-23
Payer: COMMERCIAL

## 2024-12-23 DIAGNOSIS — Z12.11 SCREEN FOR COLON CANCER: ICD-10-CM

## 2024-12-23 LAB
B-HCG UR QL: NEGATIVE
CTP QC/QA: YES

## 2024-12-23 PROCEDURE — 63600175 PHARM REV CODE 636 W HCPCS: Performed by: STUDENT IN AN ORGANIZED HEALTH CARE EDUCATION/TRAINING PROGRAM

## 2024-12-23 PROCEDURE — D9220A PRA ANESTHESIA: Mod: CRNA,,, | Performed by: STUDENT IN AN ORGANIZED HEALTH CARE EDUCATION/TRAINING PROGRAM

## 2024-12-23 PROCEDURE — D9220A PRA ANESTHESIA: Mod: ANES,,, | Performed by: ANESTHESIOLOGY

## 2024-12-23 PROCEDURE — 25000003 PHARM REV CODE 250: Performed by: STUDENT IN AN ORGANIZED HEALTH CARE EDUCATION/TRAINING PROGRAM

## 2024-12-23 PROCEDURE — 81025 URINE PREGNANCY TEST: CPT | Performed by: STUDENT IN AN ORGANIZED HEALTH CARE EDUCATION/TRAINING PROGRAM

## 2024-12-23 PROCEDURE — G0121 COLON CA SCRN NOT HI RSK IND: HCPCS | Performed by: STUDENT IN AN ORGANIZED HEALTH CARE EDUCATION/TRAINING PROGRAM

## 2024-12-23 PROCEDURE — G0121 COLON CA SCRN NOT HI RSK IND: HCPCS | Mod: ,,, | Performed by: STUDENT IN AN ORGANIZED HEALTH CARE EDUCATION/TRAINING PROGRAM

## 2024-12-23 PROCEDURE — 37000009 HC ANESTHESIA EA ADD 15 MINS: Performed by: STUDENT IN AN ORGANIZED HEALTH CARE EDUCATION/TRAINING PROGRAM

## 2024-12-23 PROCEDURE — 37000008 HC ANESTHESIA 1ST 15 MINUTES: Performed by: STUDENT IN AN ORGANIZED HEALTH CARE EDUCATION/TRAINING PROGRAM

## 2024-12-23 RX ORDER — SODIUM CHLORIDE 9 MG/ML
INJECTION, SOLUTION INTRAVENOUS CONTINUOUS
Status: DISCONTINUED | OUTPATIENT
Start: 2024-12-23 | End: 2024-12-23 | Stop reason: HOSPADM

## 2024-12-23 RX ORDER — PROPOFOL 10 MG/ML
VIAL (ML) INTRAVENOUS
Status: DISCONTINUED | OUTPATIENT
Start: 2024-12-23 | End: 2024-12-23

## 2024-12-23 RX ORDER — LIDOCAINE HYDROCHLORIDE 20 MG/ML
INJECTION INTRAVENOUS
Status: DISCONTINUED | OUTPATIENT
Start: 2024-12-23 | End: 2024-12-23

## 2024-12-23 RX ADMIN — PROPOFOL 50 MG: 10 INJECTION, EMULSION INTRAVENOUS at 10:12

## 2024-12-23 RX ADMIN — SODIUM CHLORIDE: 9 INJECTION, SOLUTION INTRAVENOUS at 09:12

## 2024-12-23 RX ADMIN — PROPOFOL 50 MG: 10 INJECTION, EMULSION INTRAVENOUS at 11:12

## 2024-12-23 RX ADMIN — LIDOCAINE HYDROCHLORIDE 80 MG: 20 INJECTION, SOLUTION INTRAVENOUS at 10:12

## 2024-12-23 NOTE — TRANSFER OF CARE
Anesthesia Transfer of Care Note    Patient: Uma Bangura    Procedure(s) Performed: Procedure(s) (LRB):  COLONOSCOPY, SCREENING, LOW RISK PATIENT (N/A)    Patient location: GI    Anesthesia Type: general    Transport from OR: Transported from OR on room air with adequate spontaneous ventilation    Post pain: adequate analgesia    Post assessment: no apparent anesthetic complications    Post vital signs: stable    Level of consciousness: lethargic and responds to stimulation    Nausea/Vomiting: no nausea/vomiting    Complications: none    Transfer of care protocol was followed      Last vitals: Visit Vitals  /72 (BP Location: Left arm, Patient Position: Lying)   Pulse 71   Temp 36.3 °C (97.3 °F) (Skin)   Resp 16   Wt 99.8 kg (220 lb)   LMP 12/18/2024 (Exact Date)   SpO2 99%   Breastfeeding No   BMI 35.51 kg/m²

## 2024-12-23 NOTE — PLAN OF CARE
Vss, jack po fluids, denies pain, ambulates easily. IV removed, catheter intact. Discharge instructions provided and states understanding. States ready to go home.  Discharged from facility with family per wheelchair.

## 2024-12-23 NOTE — PROVATION PATIENT INSTRUCTIONS
Discharge Summary/Instructions after an Endoscopic Procedure  Patient Name: Uma Bangura  Patient MRN: [id number]  Patient YOB: 1980 Monday, December 23, 2024  Ayan Dockery DO  Dear patient,  As a result of recent federal legislation (The Federal Cures Act), you may   receive lab or pathology results from your procedure in your [facility]   account before your physician is able to contact you. Your physician or   their representative will relay the results to you with their   recommendations at their soonest availability.  Thank you,  RESTRICTIONS:  During your procedure today, you received medications for sedation.  These   medications may affect your judgment, balance and coordination.  Therefore,   for 24 hours, you have the following restrictions:   - DO NOT drive a car, operate machinery, make legal/financial decisions,   sign important papers or drink alcohol.    ACTIVITY:  Today: no heavy lifting, straining or running due to procedural   sedation/anesthesia.  The following day: return to full activity including work.  DIET:  Eat and drink normally unless instructed otherwise.     TREATMENT FOR COMMON SIDE EFFECTS:  - Mild abdominal pain, nausea, belching, bloating or excessive gas:  rest,   eat lightly and use a heating pad.  - Sore Throat: treat with throat lozenges and/or gargle with warm salt   water.  - Because air was used during the procedure, expelling large amounts of air   from your rectum or belching is normal.  - If a bowel prep was taken, you may not have a bowel movement for 1-3 days.    This is normal.  SYMPTOMS TO WATCH FOR AND REPORT TO YOUR PHYSICIAN:  1. Abdominal pain or bloating, other than gas cramps.  2. Chest pain.  3. Back pain.  4. Signs of infection such as: chills or fever occurring within 24 hours   after the procedure.  5. Rectal bleeding, which would show as bright red, maroon, or black stools.   (A tablespoon of blood from the rectum is not serious, especially  if   hemorrhoids are present.)  6. Vomiting.  7. Weakness or dizziness.  GO DIRECTLY TO THE NEAREST EMERGENCY ROOM IF YOU HAVE ANY OF THE FOLLOWING:      Difficulty breathing              Chills and/or fever over 101 F   Persistent vomiting and/or vomiting blood   Severe abdominal pain   Severe chest pain   Black, tarry stools   Bleeding- more than one tablespoon   Any other symptom or condition that you feel may need urgent attention  Your doctor recommends these additional instructions:  If any biopsies were taken, your doctors clinic will contact you in 1 to 2   weeks with any results.  - Patient has a contact number available for emergencies.  The signs and   symptoms of potential delayed complications were discussed with the   patient.  Return to normal activities tomorrow.  Written discharge   instructions were provided to the patient.   - High fiber diet.   - Continue present medications.   - Repeat colonoscopy in 10 years for screening purposes.   - Return to GI office PRN.   - Discharge patient to home (ambulatory).  For questions, problems or results please call your physician - Ayan Dockery DO at Work:  [phone].  [facility], EMERGENCY ROOM PHONE NUMBER: [phone]  IF A COMPLICATION OR EMERGENCY SITUATION ARISES AND YOU ARE UNABLE TO REACH   YOUR PHYSICIAN - GO DIRECTLY TO THE EMERGENCY ROOM.  Ayan Dockery DO  12/23/2024 11:28:22 AM  This report has been verified and signed electronically.  Dear patient,  As a result of recent federal legislation (The Federal Cures Act), you may   receive lab or pathology results from your procedure in your [facility]   account before your physician is able to contact you. Your physician or   their representative will relay the results to you with their   recommendations at their soonest availability.  Thank you,  PROVATION

## 2024-12-23 NOTE — ANESTHESIA PREPROCEDURE EVALUATION
12/23/2024  Uma Bangura is a 44 y.o., female.      Pre-op Assessment    I have reviewed the Patient Summary Reports.     I have reviewed the Nursing Notes. I have reviewed the NPO Status.   I have reviewed the Medications.     Review of Systems  Pulmonary:        Sleep Apnea     Obstructive Sleep Apnea (NICOLAS).           Hepatic/GI:  Bowel Prep.   GERD                Neurological:  TIA,                         TIA - Transient Ischemic Attack               Endocrine:        Morbid Obesity / BMI > 40      Physical Exam  General: Well nourished    Airway:  Mallampati: III   Mouth Opening: Normal  TM Distance: Normal  Neck ROM: Normal ROM        Anesthesia Plan  Type of Anesthesia, risks & benefits discussed:    Anesthesia Type: Gen Natural Airway  Intra-op Monitoring Plan: Standard ASA Monitors  Informed Consent: Informed consent signed with the Patient and all parties understand the risks and agree with anesthesia plan.  All questions answered.   ASA Score: 3    Ready For Surgery From Anesthesia Perspective.     .

## 2024-12-24 VITALS
TEMPERATURE: 98 F | OXYGEN SATURATION: 100 % | DIASTOLIC BLOOD PRESSURE: 80 MMHG | SYSTOLIC BLOOD PRESSURE: 115 MMHG | RESPIRATION RATE: 16 BRPM | BODY MASS INDEX: 35.51 KG/M2 | HEART RATE: 73 BPM | WEIGHT: 220 LBS

## 2024-12-26 NOTE — ANESTHESIA POSTPROCEDURE EVALUATION
Anesthesia Post Evaluation    Patient: Uma Bangura    Procedure(s) Performed: Procedure(s) (LRB):  COLONOSCOPY, SCREENING, LOW RISK PATIENT (N/A)    Final Anesthesia Type: general      Patient location during evaluation: PACU  Patient participation: Yes- Able to Participate  Level of consciousness: awake and alert and oriented  Post-procedure vital signs: reviewed and stable  Pain management: adequate  Airway patency: patent    PONV status at discharge: No PONV  Anesthetic complications: no      Cardiovascular status: blood pressure returned to baseline  Respiratory status: unassisted, spontaneous ventilation and room air  Hydration status: euvolemic  Follow-up not needed.              Vitals Value Taken Time   /80 12/23/24 1139   Temp 36.5 °C (97.7 °F) 12/23/24 1124   Pulse 73 12/23/24 1139   Resp 16 12/23/24 1139   SpO2 100 % 12/23/24 1139         Event Time   Out of Recovery 11:54:54         Pain/Travis Score: No data recorded

## 2025-02-25 ENCOUNTER — OFFICE VISIT (OUTPATIENT)
Dept: FAMILY MEDICINE | Facility: CLINIC | Age: 45
End: 2025-02-25
Payer: COMMERCIAL

## 2025-02-25 VITALS
WEIGHT: 237.19 LBS | DIASTOLIC BLOOD PRESSURE: 80 MMHG | SYSTOLIC BLOOD PRESSURE: 120 MMHG | BODY MASS INDEX: 38.12 KG/M2 | OXYGEN SATURATION: 98 % | HEART RATE: 92 BPM | TEMPERATURE: 98 F | HEIGHT: 66 IN

## 2025-02-25 DIAGNOSIS — Z76.89 ENCOUNTER TO ESTABLISH CARE: ICD-10-CM

## 2025-02-25 DIAGNOSIS — M62.838 MUSCLE SPASMS OF LOWER EXTREMITY: ICD-10-CM

## 2025-02-25 DIAGNOSIS — Z87.39 HX OF JUVENILE RHEUMATOID ARTHRITIS: ICD-10-CM

## 2025-02-25 DIAGNOSIS — M35.00 SICCA SYNDROME: ICD-10-CM

## 2025-02-25 DIAGNOSIS — E66.01 SEVERE OBESITY (BMI 35.0-39.9) WITH COMORBIDITY: ICD-10-CM

## 2025-02-25 DIAGNOSIS — M54.12 CERVICAL RADICULOPATHY: ICD-10-CM

## 2025-02-25 DIAGNOSIS — Z00.00 ENCOUNTER FOR ANNUAL GENERAL MEDICAL EXAMINATION WITHOUT ABNORMAL FINDINGS IN ADULT: Primary | ICD-10-CM

## 2025-02-25 DIAGNOSIS — Z86.69 HX OF MIGRAINES: ICD-10-CM

## 2025-02-25 PROCEDURE — 99999 PR PBB SHADOW E&M-EST. PATIENT-LVL V: CPT | Mod: PBBFAC,,, | Performed by: STUDENT IN AN ORGANIZED HEALTH CARE EDUCATION/TRAINING PROGRAM

## 2025-02-25 RX ORDER — CYCLOBENZAPRINE HCL 10 MG
10 TABLET ORAL 3 TIMES DAILY PRN
Qty: 30 TABLET | Refills: 2 | Status: SHIPPED | OUTPATIENT
Start: 2025-02-25 | End: 2025-03-27

## 2025-02-25 NOTE — PATIENT INSTRUCTIONS
Stefan Eaton,     If you are due for any health screening(s) below please notify me so we can arrange them to be ordered and scheduled to maintain your health. Most healthy patients complete it. Don't lose out on improving your health.     All of your core healthy metrics are met.

## 2025-02-27 ENCOUNTER — LAB VISIT (OUTPATIENT)
Dept: LAB | Facility: HOSPITAL | Age: 45
End: 2025-02-27
Attending: STUDENT IN AN ORGANIZED HEALTH CARE EDUCATION/TRAINING PROGRAM
Payer: COMMERCIAL

## 2025-02-27 DIAGNOSIS — M35.00 SICCA SYNDROME: ICD-10-CM

## 2025-02-27 DIAGNOSIS — Z00.00 ENCOUNTER FOR ANNUAL GENERAL MEDICAL EXAMINATION WITHOUT ABNORMAL FINDINGS IN ADULT: ICD-10-CM

## 2025-02-27 DIAGNOSIS — M62.838 MUSCLE SPASMS OF LOWER EXTREMITY: ICD-10-CM

## 2025-02-27 DIAGNOSIS — Z87.39 HX OF JUVENILE RHEUMATOID ARTHRITIS: ICD-10-CM

## 2025-02-27 LAB
ALBUMIN SERPL BCP-MCNC: 4.1 G/DL (ref 3.5–5.2)
ALP SERPL-CCNC: 73 U/L (ref 40–150)
ALT SERPL W/O P-5'-P-CCNC: 20 U/L (ref 10–44)
ANION GAP SERPL CALC-SCNC: 12 MMOL/L (ref 8–16)
AST SERPL-CCNC: 25 U/L (ref 10–40)
BILIRUB SERPL-MCNC: 0.5 MG/DL (ref 0.1–1)
BUN SERPL-MCNC: 16 MG/DL (ref 6–20)
CALCIUM SERPL-MCNC: 9.9 MG/DL (ref 8.7–10.5)
CCP AB SER IA-ACNC: 0.6 U/ML
CHLORIDE SERPL-SCNC: 107 MMOL/L (ref 95–110)
CHOLEST SERPL-MCNC: 185 MG/DL (ref 120–199)
CHOLEST/HDLC SERPL: 2.6 {RATIO} (ref 2–5)
CO2 SERPL-SCNC: 24 MMOL/L (ref 23–29)
CREAT SERPL-MCNC: 1 MG/DL (ref 0.5–1.4)
CRP SERPL-MCNC: 14 MG/L (ref 0–8.2)
ERYTHROCYTE [DISTWIDTH] IN BLOOD BY AUTOMATED COUNT: 13 % (ref 11.5–14.5)
ERYTHROCYTE [SEDIMENTATION RATE] IN BLOOD BY PHOTOMETRIC METHOD: 6 MM/HR (ref 0–36)
EST. GFR  (NO RACE VARIABLE): >60 ML/MIN/1.73 M^2
ESTIMATED AVG GLUCOSE: 103 MG/DL (ref 68–131)
FERRITIN SERPL-MCNC: 106 NG/ML (ref 20–300)
GLUCOSE SERPL-MCNC: 85 MG/DL (ref 70–110)
HBA1C MFR BLD: 5.2 % (ref 4–5.6)
HCT VFR BLD AUTO: 42.8 % (ref 37–48.5)
HDLC SERPL-MCNC: 70 MG/DL (ref 40–75)
HDLC SERPL: 37.8 % (ref 20–50)
HGB BLD-MCNC: 13.8 G/DL (ref 12–16)
LDLC SERPL CALC-MCNC: 97 MG/DL (ref 63–159)
MAGNESIUM SERPL-MCNC: 2.1 MG/DL (ref 1.6–2.6)
MCH RBC QN AUTO: 29.2 PG (ref 27–31)
MCHC RBC AUTO-ENTMCNC: 32.2 G/DL (ref 32–36)
MCV RBC AUTO: 91 FL (ref 82–98)
NONHDLC SERPL-MCNC: 115 MG/DL
PLATELET # BLD AUTO: 252 K/UL (ref 150–450)
PMV BLD AUTO: 11.5 FL (ref 9.2–12.9)
POTASSIUM SERPL-SCNC: 4.3 MMOL/L (ref 3.5–5.1)
PROT SERPL-MCNC: 7.5 G/DL (ref 6–8.4)
RBC # BLD AUTO: 4.73 M/UL (ref 4–5.4)
RHEUMATOID FACT SERPL-ACNC: <13 IU/ML (ref 0–15)
SODIUM SERPL-SCNC: 143 MMOL/L (ref 136–145)
TRIGL SERPL-MCNC: 90 MG/DL (ref 30–150)
TSH SERPL DL<=0.005 MIU/L-ACNC: 1.48 UIU/ML (ref 0.4–4)
WBC # BLD AUTO: 7.93 K/UL (ref 3.9–12.7)

## 2025-02-27 PROCEDURE — 85027 COMPLETE CBC AUTOMATED: CPT | Performed by: STUDENT IN AN ORGANIZED HEALTH CARE EDUCATION/TRAINING PROGRAM

## 2025-02-27 PROCEDURE — 83735 ASSAY OF MAGNESIUM: CPT | Performed by: STUDENT IN AN ORGANIZED HEALTH CARE EDUCATION/TRAINING PROGRAM

## 2025-02-27 PROCEDURE — 80061 LIPID PANEL: CPT | Performed by: STUDENT IN AN ORGANIZED HEALTH CARE EDUCATION/TRAINING PROGRAM

## 2025-02-27 PROCEDURE — 80053 COMPREHEN METABOLIC PANEL: CPT | Performed by: STUDENT IN AN ORGANIZED HEALTH CARE EDUCATION/TRAINING PROGRAM

## 2025-02-27 PROCEDURE — 86431 RHEUMATOID FACTOR QUANT: CPT | Performed by: STUDENT IN AN ORGANIZED HEALTH CARE EDUCATION/TRAINING PROGRAM

## 2025-02-27 PROCEDURE — 84443 ASSAY THYROID STIM HORMONE: CPT | Performed by: STUDENT IN AN ORGANIZED HEALTH CARE EDUCATION/TRAINING PROGRAM

## 2025-02-27 PROCEDURE — 86038 ANTINUCLEAR ANTIBODIES: CPT | Performed by: STUDENT IN AN ORGANIZED HEALTH CARE EDUCATION/TRAINING PROGRAM

## 2025-02-27 PROCEDURE — 82728 ASSAY OF FERRITIN: CPT | Performed by: STUDENT IN AN ORGANIZED HEALTH CARE EDUCATION/TRAINING PROGRAM

## 2025-02-27 PROCEDURE — 86200 CCP ANTIBODY: CPT | Performed by: STUDENT IN AN ORGANIZED HEALTH CARE EDUCATION/TRAINING PROGRAM

## 2025-02-27 PROCEDURE — 86225 DNA ANTIBODY NATIVE: CPT | Performed by: STUDENT IN AN ORGANIZED HEALTH CARE EDUCATION/TRAINING PROGRAM

## 2025-02-27 PROCEDURE — 86235 NUCLEAR ANTIGEN ANTIBODY: CPT | Performed by: STUDENT IN AN ORGANIZED HEALTH CARE EDUCATION/TRAINING PROGRAM

## 2025-02-27 PROCEDURE — 86235 NUCLEAR ANTIGEN ANTIBODY: CPT | Mod: 59 | Performed by: STUDENT IN AN ORGANIZED HEALTH CARE EDUCATION/TRAINING PROGRAM

## 2025-02-27 PROCEDURE — 83036 HEMOGLOBIN GLYCOSYLATED A1C: CPT | Performed by: STUDENT IN AN ORGANIZED HEALTH CARE EDUCATION/TRAINING PROGRAM

## 2025-02-27 PROCEDURE — 36415 COLL VENOUS BLD VENIPUNCTURE: CPT | Mod: PO | Performed by: STUDENT IN AN ORGANIZED HEALTH CARE EDUCATION/TRAINING PROGRAM

## 2025-02-27 PROCEDURE — 86140 C-REACTIVE PROTEIN: CPT | Performed by: STUDENT IN AN ORGANIZED HEALTH CARE EDUCATION/TRAINING PROGRAM

## 2025-02-27 PROCEDURE — 85652 RBC SED RATE AUTOMATED: CPT | Performed by: STUDENT IN AN ORGANIZED HEALTH CARE EDUCATION/TRAINING PROGRAM

## 2025-02-28 ENCOUNTER — RESULTS FOLLOW-UP (OUTPATIENT)
Dept: FAMILY MEDICINE | Facility: CLINIC | Age: 45
End: 2025-02-28

## 2025-02-28 LAB
ANA SER QL IF: NORMAL
ANTI SM ANTIBODY: 0.11 RATIO (ref 0–0.99)
ANTI-SM INTERPRETATION: NEGATIVE
ANTI-SSA ANTIBODY: 0.08 RATIO (ref 0–0.99)
ANTI-SSA INTERPRETATION: NEGATIVE
DSDNA AB SER-ACNC: NORMAL [IU]/ML

## 2025-02-28 NOTE — PROGRESS NOTES
SUBJECTIVE:    CHIEF COMPLAINT:   Chief Complaint   Patient presents with    Establish Care           274}    HISTORY OF PRESENT ILLNESS:  History of Present Illness    CHIEF COMPLAINT:  Ms. Bangura presents for an annual wellness visit and to establish care with a new PCP.    HPI:  Ms. Bangura is a 45F with GERD, HLD, SEDA, migraines cervical radiculopathy s/p cervical fusion and MNG presents to establish care. She reports a history of migraines, which she believes are hormonal in nature. The migraines typically occur around her menstrual cycle, about 1-2 weeks before. They are characterized as tension-based, starting at the base of the neck and progressing upwards. Stress can exacerbate her migraines. She takes Ubrelvy as needed for migraine relief, which she reports is effective.    She underwent cervical fusion surgery (C5-C7) approximately 1 year ago due to degenerative disc disease. She recently been released from neurosurgical care.    She has frequent muscle cramps throughout her body, including in her jaw, hands, toes, and legs. These cramps can occur during sleep, causing her to abruptly awaken. She takes potassium and magnesium supplements nightly to help manage the cramps. She also mentions chronic dry eyes, dry mouth, and Hx of Juvenile arthritis. She denies any current shortness of breath, fevers, chills, issues with swallowing, or abdominal pain.    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          PAST MEDICAL HISTORY:     274}  Past Medical History:   Diagnosis Date    Anxiety state, unspecified     Disturbance of salivary secretion     Edema     GERD (gastroesophageal reflux disease)     Goiter, unspecified     Migraines     Nonspecific elevation of levels of transaminase or lactic acid dehydrogenase (LDH)     Polyphagia(783.6)     Unspecified sleep apnea      patient denies    Varices of other sites        PAST SURGICAL HISTORY:  Past Surgical History:   Procedure Laterality Date    CERVICAL SPINE SURGERY      cage in neck     SECTION      x 2    COLONOSCOPY, SCREENING, LOW RISK PATIENT N/A 2024    Procedure: COLONOSCOPY, SCREENING, LOW RISK PATIENT;  Surgeon: Ayan Dockery DO;  Location: [facility] ENDO;  Service: Endoscopy;  Laterality: N/A;    ECHOCARDIOGRAM,TRANSESOPHAGEAL N/A 2023    Procedure: Transesophageal echo (RAYRAY) intra-procedure log documentation;  Surgeon: Thuy Jane MD;  Location: [facility] MINIMALLY INVASIVE CARDIOLOGY;  Service: Cardiology;  Laterality: N/A;    ENDOMETRIAL ABLATION      OOPHORECTOMY Left     Dermoid cyst    TUBAL LIGATION         SOCIAL HISTORY:  Social History[1]    FAMILY HISTORY:       Family History   Problem Relation Name Age of Onset    Endometriosis Maternal Grandmother      Diabetes Father      Hypertension Father      Endometriosis Mother      Cancer Mother          thyroid    Hypertension Mother      Breast cancer Neg Hx      Uterine cancer Neg Hx      Cervical cancer Neg Hx      Ovarian cancer Neg Hx         ALLERGIES AND MEDICATIONS: updated and reviewed.      274}  Review of patient's allergies indicates:   Allergen Reactions    Sertraline      Other reaction(s): heartburn     Medication List with Changes/Refills   New Medications    CYCLOBENZAPRINE (FLEXERIL) 10 MG TABLET    Take 1 tablet (10 mg total) by mouth 3 (three) times daily as needed for Muscle spasms.   Current Medications    ASPIRIN (ECOTRIN) 81 MG EC TABLET    Take 1 tablet (81 mg total) by mouth once daily.    CYCLOSPORINE (RESTASIS) 0.05 % OPHTHALMIC EMULSION    Place 1 drop into both eyes 2 (two) times daily.    MAGNESIUM GLUCONATE (MAG-G) 27 MG MAGNESIUM (500 MG) TAB TABLET    Take 27 mg by mouth once.    POTASSIUM CITRATE 99 MG CAP    Take 1 capsule by mouth once daily.    UBROGEPANT (UBRELVY) 100 MG TABLET    take 1 tablet by  "mouth as needed may take 2nd dose at least 2 hours after first does as needed   Discontinued Medications    AMMONIUM LACTATE (LAC-HYDRIN) 12 % LOTION    Apply 1 g topically 2 (two) times daily as needed for Dry Skin.    CLOBETASOL (TEMOVATE) 0.05 % EXTERNAL SOLUTION    Apply 1 mL topically 2 (two) times daily.    FERROUS SULFATE (FEOSOL) 325 MG (65 MG IRON) TAB TABLET    TAKE ONE TABLET BY MOUTH EVERY DAY    OMEGA-3 FATTY ACIDS/FISH OIL (FISH OIL-OMEGA-3 FATTY ACIDS) 300-1,000 MG CAPSULE    Take 1 capsule by mouth once daily.       SCREENING HISTORY:    274}  Health Maintenance         Date Due Completion Date    TETANUS VACCINE Never done ---    Influenza Vaccine (1) Never done ---    COVID-19 Vaccine (1 - 2024-25 season) Never done ---    Mammogram 09/13/2025 9/13/2024    Hemoglobin A1c (Diabetic Prevention Screening) 02/27/2028 2/27/2025    Cervical Cancer Screening 08/27/2029 8/27/2024    Override on 7/1/2015: Done    Override on 2/19/2013: Done (WNL)    Lipid Panel 02/27/2030 2/27/2025    Colorectal Cancer Screening 12/23/2034 12/23/2024    RSV Vaccine (Age 60+ and Pregnant patients) (1 - 1-dose 75+ series) 02/16/2053 ---                  PHYSICAL EXAM:      274}  /80 (BP Location: Right arm, Patient Position: Sitting)   Pulse 92   Temp 98.1 °F (36.7 °C) (Oral)   Ht 5' 6" (1.676 m)   Wt 107.6 kg (237 lb 3.4 oz)   SpO2 98%   BMI 38.29 kg/m²   Wt Readings from Last 3 Encounters:   02/25/25 107.6 kg (237 lb 3.4 oz)   12/23/24 99.8 kg (220 lb)   08/27/24 107.1 kg (236 lb 1.8 oz)     BP Readings from Last 3 Encounters:   02/25/25 120/80   12/23/24 115/80   08/27/24 126/82     Estimated body mass index is 38.29 kg/m² as calculated from the following:    Height as of this encounter: 5' 6" (1.676 m).    Weight as of this encounter: 107.6 kg (237 lb 3.4 oz).       Physical Exam  Vitals reviewed.   Constitutional:       Appearance: Normal appearance. She is obese.   HENT:      Head: Normocephalic and " atraumatic.      Right Ear: External ear normal.      Left Ear: External ear normal.   Eyes:      Extraocular Movements: Extraocular movements intact.      Pupils: Pupils are equal, round, and reactive to light.   Cardiovascular:      Rate and Rhythm: Normal rate and regular rhythm.      Pulses: Normal pulses.      Heart sounds: Normal heart sounds.   Pulmonary:      Effort: Pulmonary effort is normal.      Breath sounds: Normal breath sounds.   Abdominal:      General: There is no distension.      Palpations: Abdomen is soft. There is no mass.      Tenderness: There is no abdominal tenderness.   Musculoskeletal:         General: Normal range of motion.      Cervical back: Normal range of motion.   Neurological:      Mental Status: She is alert and oriented to person, place, and time. Mental status is at baseline.   Psychiatric:         Mood and Affect: Mood normal.         Behavior: Behavior normal.         Judgment: Judgment normal.              LABS:   274}  I have reviewed old labs below:  Lab Results   Component Value Date    WBC 7.93 02/27/2025    HGB 13.8 02/27/2025    HCT 42.8 02/27/2025    MCV 91 02/27/2025     02/27/2025     02/27/2025    K 4.3 02/27/2025     02/27/2025    CALCIUM 9.9 02/27/2025    PHOS 3.6 11/28/2023    CO2 24 02/27/2025    GLU 85 02/27/2025    BUN 16 02/27/2025    CREATININE 1.0 02/27/2025    ANIONGAP 12 02/27/2025    ESTGFRAFRICA >60.0 07/26/2022    EGFRNONAA >60.0 07/26/2022    PROT 7.5 02/27/2025    ALBUMIN 4.1 02/27/2025    BILITOT 0.5 02/27/2025    ALKPHOS 73 02/27/2025    ALT 20 02/27/2025    AST 25 02/27/2025    INR 0.9 11/28/2023    CHOL 185 02/27/2025    TRIG 90 02/27/2025    HDL 70 02/27/2025    LDLCALC 97.0 02/27/2025    TSH 1.476 02/27/2025    HGBA1C 5.2 02/27/2025       ASSESSMENT AND PLAN:  274}  1. Encounter for annual general medical examination without abnormal findings in adult  -     TSH; Future; Expected date: 02/25/2025  -     Lipid Panel; Future;  Expected date: 02/25/2025  -     Hemoglobin A1C; Future; Expected date: 02/25/2025  -     Comprehensive Metabolic Panel; Future; Expected date: 02/25/2025  -     CBC Without Differential; Future; Expected date: 02/25/2025  -     Ferritin; Future; Expected date: 02/25/2025    2. Encounter to establish care    3. Hx of migraines  Comments:  Controlled Mg,and followed by Neurology. Provided Ubrelvy    4. Severe obesity (BMI 35.0-39.9) with comorbidity  Comments:  BMI 38. Recommend low fat, low carb, diet. Exercise 3-4x per week for 30-40mins.  Mediterranean, and Paleo diets are suggested    5. Cervical radiculopathy  Overview:  S/p cervical Fusion for DDD of Cervical Spine.    Orders:  -     cyclobenzaprine (FLEXERIL) 10 MG tablet; Take 1 tablet (10 mg total) by mouth 3 (three) times daily as needed for Muscle spasms.  Dispense: 30 tablet; Refill: 2  -     Ambulatory referral/consult to Medical Massage Therapy; Future; Expected date: 02/26/2025    6. Muscle spasms of lower extremity  -     Magnesium; Future; Expected date: 03/11/2025    7. Hx of juvenile rheumatoid arthritis  -     Sedimentation rate; Future; Expected date: 02/25/2025  -     C-reactive protein; Future; Expected date: 02/25/2025  -     DIANE; Future; Expected date: 02/25/2025  -     Rheumatoid factor; Future; Expected date: 02/25/2025  -     Cyclic citrul peptide antibody, IgG; Future; Expected date: 02/25/2025  -     Ambulatory referral/consult to Rheumatology; Future; Expected date: 03/04/2025  -     ANTI-DNA ANTIBODY, DOUBLE-STRANDED; Future; Expected date: 02/25/2025  -     Anti-Smith Antibody; Future; Expected date: 02/25/2025    8. Sicca syndrome  -     Sjogrens syndrome-A extractable nuclear antibody; Future; Expected date: 02/25/2025         Assessment & Plan    IMPRESSION:   Considered cervicogenic migraines due to neck fusion and tension, but patient believes migraines are hormonal   Evaluated chronic muscle cramps; considering electrolyte  imbalance as potential cause   Assessed fluid retention and constipation; planning to investigate with lab work   Concerned about patient's excessive water intake (1.5 gallons/day), potentially diluting electrolytes    SEVERE OBESITY (BMI 35.0-39.9) WITH COMORBIDITY:   Ordered annual labs including cholesterol levels and will review results to assess patient's overall health status, including obesity-related markers.   Performed physical exam, including auscultation of heart and lungs, and abdominal palpation.   Noted patient's reports of constipation problems and infrequent bowel movements, as well as observed fluid retention, both of which can be associated with obesity.   Discussed patient's excessive water intake and its potential impact on electrolyte balance.   Recommend reducing water intake to prevent potential electrolyte imbalance.    CERVICOGENIC MIGRAINE:   Evaluated migraine as potentially cervicogenic, originating from neck tension.   Explained relationship between cervical tension and migraines.   Continued Ubrelly for migraines, recommending increased use to 3 times per week during periods of high stress.   Prescribed Robaxin (muscle relaxer) for neck tension and migraines, as needed.   Recommend medical massage therapy for tension relief.   Noted patient reports migraines occurring around menstrual cycle, starting at the base of the neck and moving upwards.    CERVICAL FUSION:   Noted patient had cervical fusion surgery for degenerative disc disease 1 year ago and has been released by neurosurgeon after follow-ups.   Added cervical fusion information to patient's chart.   Ordered medical massage therapy 2-3 times per week, avoiding electric stimulation due to hardware.    MUSCLE CRAMPS:   Evaluated possibility of electrolyte imbalances causing muscle cramps throughout the body, including jaw, hands, toes, and legs.   Plan to check electrolytes to investigate cause.   Noted patient is taking  potassium and magnesium supplements, which will be continued.   Discussed potassium retention relying on magnesium-gated channels in the kidney.   Acknowledged family history and possible hereditary factors in muscle cramps.    CONSTIPATION:   Acknowledged that not everyone has daily bowel movements.   Reviewed recent normal colonoscopy results.   Noted patient occasionally uses laxatives when constipated for more than a week.    BREAST HEALTH:   Reviewed previous mammogram results from September last year, which confirmed a cyst along with ultrasound.   Instructed patient to schedule mammogram follow-up.    FOLLOW UP:   Schedule follow-up for annual visit.   Ms. Bangura to contact office to confirm lab opening times for scheduling before upcoming trip.         Orders Placed This Encounter   Procedures    TSH    Lipid Panel    Hemoglobin A1C    Comprehensive Metabolic Panel    CBC Without Differential    Ferritin    Magnesium    Sedimentation rate    C-reactive protein    DIANE    Rheumatoid factor    Cyclic citrul peptide antibody, IgG    Sjogrens syndrome-A extractable nuclear antibody    ANTI-DNA ANTIBODY, DOUBLE-STRANDED    Anti-Smith Antibody    Ambulatory referral/consult to Medical Massage Therapy    Ambulatory referral/consult to Rheumatology         This note was generated with the assistance of ambient listening technology. Verbal consent was obtained by the patient and accompanying visitor(s) for the recording of patient appointment to facilitate this note. I attest to having reviewed and edited the generated note for accuracy, though some syntax or spelling errors may persist. Please contact the author of this note for any clarification.               [1]   Social History  Socioeconomic History    Marital status:    Tobacco Use    Smoking status: Never    Smokeless tobacco: Never   Substance and Sexual Activity    Alcohol use: Yes     Comment: occasionally    Drug use: No    Sexual activity: Yes      Partners: Male     Birth control/protection: See Surgical Hx     Comment:      Social Drivers of Health     Financial Resource Strain: Low Risk  (12/4/2023)    Overall Financial Resource Strain (CARDIA)     Difficulty of Paying Living Expenses: Not hard at all   Food Insecurity: No Food Insecurity (12/4/2023)    Hunger Vital Sign     Worried About Running Out of Food in the Last Year: Never true     Ran Out of Food in the Last Year: Never true   Transportation Needs: No Transportation Needs (12/4/2023)    PRAPARE - Transportation     Lack of Transportation (Medical): No     Lack of Transportation (Non-Medical): No   Physical Activity: Inactive (12/4/2023)    Exercise Vital Sign     Days of Exercise per Week: 0 days     Minutes of Exercise per Session: 0 min   Stress: Stress Concern Present (12/4/2023)    [employer] - Occupational Stress Questionnaire     Feeling of Stress : To some extent   Housing Stability: Low Risk  (12/4/2023)    Housing Stability Vital Sign     Unable to Pay for Housing in the Last Year: No     Number of Places Lived in the Last Year: 1     Unstable Housing in the Last Year: No

## 2025-03-26 ENCOUNTER — HOSPITAL ENCOUNTER (OUTPATIENT)
Dept: RADIOLOGY | Facility: HOSPITAL | Age: 45
Discharge: HOME | End: 2025-03-26
Attending: OBSTETRICS & GYNECOLOGY
Payer: COMMERCIAL

## 2025-03-26 VITALS — HEIGHT: 66 IN | WEIGHT: 237 LBS | BODY MASS INDEX: 38.09 KG/M2

## 2025-03-26 DIAGNOSIS — R92.8 ABNORMAL MAMMOGRAM: ICD-10-CM

## 2025-03-26 PROCEDURE — 77061 BREAST TOMOSYNTHESIS UNI: CPT | Mod: TC,PO,LT

## 2025-03-26 PROCEDURE — 76642 ULTRASOUND BREAST LIMITED: CPT | Mod: 26,LT,, | Performed by: RADIOLOGY

## 2025-03-26 PROCEDURE — 77061 BREAST TOMOSYNTHESIS UNI: CPT | Mod: 26,LT,, | Performed by: RADIOLOGY

## 2025-03-26 PROCEDURE — 77065 DX MAMMO INCL CAD UNI: CPT | Mod: 26,LT,, | Performed by: RADIOLOGY

## 2025-03-26 PROCEDURE — 76642 ULTRASOUND BREAST LIMITED: CPT | Mod: TC,PO,LT

## 2025-03-27 ENCOUNTER — RESULTS FOLLOW-UP (OUTPATIENT)
Dept: OBSTETRICS AND GYNECOLOGY | Facility: CLINIC | Age: 45
End: 2025-03-27
Payer: COMMERCIAL

## 2025-03-27 DIAGNOSIS — R92.8 ABNORMAL MAMMOGRAM: Primary | ICD-10-CM

## 2025-05-12 ENCOUNTER — TELEPHONE (OUTPATIENT)
Dept: ALLERGY | Facility: CLINIC | Age: 45
End: 2025-05-12
Payer: COMMERCIAL

## 2025-05-12 NOTE — TELEPHONE ENCOUNTER
Called pt to get her est with another provider. I even offered her a follow up with Abby Camacho. Pt stated she will go in the portal and  find another provider and get back with me. Her appt with Dr. Anderson has been canceled for 8/5

## 2025-06-12 ENCOUNTER — OFFICE VISIT (OUTPATIENT)
Dept: RHEUMATOLOGY | Facility: CLINIC | Age: 45
End: 2025-06-12
Payer: COMMERCIAL

## 2025-06-12 VITALS
BODY MASS INDEX: 38.16 KG/M2 | RESPIRATION RATE: 19 BRPM | HEART RATE: 78 BPM | WEIGHT: 237.44 LBS | OXYGEN SATURATION: 97 % | SYSTOLIC BLOOD PRESSURE: 113 MMHG | DIASTOLIC BLOOD PRESSURE: 82 MMHG | HEIGHT: 66 IN

## 2025-06-12 DIAGNOSIS — H16.229 SICCA SYNDROME WITH KERATOCONJUNCTIVITIS: Primary | ICD-10-CM

## 2025-06-12 DIAGNOSIS — M25.50 ARTHRALGIA, UNSPECIFIED JOINT: ICD-10-CM

## 2025-06-12 DIAGNOSIS — Z87.39 HX OF JUVENILE RHEUMATOID ARTHRITIS: ICD-10-CM

## 2025-06-12 PROCEDURE — 99999 PR PBB SHADOW E&M-EST. PATIENT-LVL IV: CPT | Mod: PBBFAC,,, | Performed by: STUDENT IN AN ORGANIZED HEALTH CARE EDUCATION/TRAINING PROGRAM

## 2025-06-12 NOTE — PROGRESS NOTES
RHEUMATOLOGY OUTPATIENT CLINIC NOTE    6/12/2025    Attending Rheumatologist: Tanvi Quintanilla  Primary Care Provider: Gary Anderson DO   Physician Requesting Consultation: Gary Anderson DO  [address] [facility]  [place] [id number]  Chief Complaint/Reason For Consultation:  Other Misc (New Patient with Sjogren's Syndrome)      Subjective:       HPI  Uma Bangura is a 45 y.o. White female who comes for evaluation of Sjogren's.     She has been having dry eyes for many years. She is on restasis and lubricant drops for many years.  She also has dry mouth for many years. Drinks a lot of water. She chew gums all day. She does not use biotene products.   She also has dry skin. She has vaginal dryness sometimes.   +oral ulcers   +intermittent swollen glands  She denies any skin rashes, CP, SOB, abdominal pain, changes in urine.   She reports chronic pain in knees, intermittent depending on what activities. She notices stiffness in ankles and feet.   She reports that she was diagnosed with ALBERTO (knees were involved mostly) when she was a baby. She took naproxen. At age 6 she was told she was on remission.   She reports about 8 years ago she was evaluated for Sjogren's by a rheumatologist.  Was told that she did not have it at that time.  She reports that she had a lip biopsy that was negative for Sjogren's.    She denies any family history of autoimmune conditions.   No smoking or vaping. Occasional alcohol use. No illicit drugs  She works as a teacher.     Lasb  2/2025  DIANE negative  SSA negative  DSDNA, SM negative  RF, CCP negative  ESR normal  CRP 14.0 < 13.2   CBC, CMP normal.    Review of Systems   Constitutional:  Positive for unexpected weight change. Negative for fever.   HENT:  Positive for mouth sores and trouble swallowing.    Eyes:  Positive for redness.   Respiratory:  Negative for cough and shortness of breath.    Cardiovascular:  Negative for chest pain.   Gastrointestinal:   Positive for constipation. Negative for diarrhea.   Genitourinary:  Negative for dysuria and genital sores.   Integumentary:  Negative for rash.   Neurological:  Positive for headaches.   Hematological:  Bruises/bleeds easily.        Chronic comorbid conditions affecting medical decision making today:  Past Medical History:   Diagnosis Date    Anxiety state, unspecified     Disturbance of salivary secretion     Edema     GERD (gastroesophageal reflux disease)     Goiter, unspecified     Migraines     Nonspecific elevation of levels of transaminase or lactic acid dehydrogenase (LDH)     Polyphagia(783.6)     Unspecified sleep apnea     patient denies    Varices of other sites      Past Surgical History:   Procedure Laterality Date    CERVICAL SPINE SURGERY      cage in neck     SECTION      x 2    COLONOSCOPY, SCREENING, LOW RISK PATIENT N/A 2024    Procedure: COLONOSCOPY, SCREENING, LOW RISK PATIENT;  Surgeon: Ayan Dockery DO;  Location: [facility] ENDO;  Service: Endoscopy;  Laterality: N/A;    ECHOCARDIOGRAM,TRANSESOPHAGEAL N/A 2023    Procedure: Transesophageal echo (RAYRAY) intra-procedure log documentation;  Surgeon: Thuy Jane MD;  Location: [facility] MINIMALLY INVASIVE CARDIOLOGY;  Service: Cardiology;  Laterality: N/A;    ENDOMETRIAL ABLATION      OOPHORECTOMY Left     Dermoid cyst    TUBAL LIGATION       Family History   Problem Relation Name Age of Onset    Endometriosis Maternal Grandmother      Diabetes Father      Hypertension Father      Endometriosis Mother      Cancer Mother          thyroid    Hypertension Mother      Breast cancer Neg Hx      Uterine cancer Neg Hx      Cervical cancer Neg Hx      Ovarian cancer Neg Hx       Social History     Substance and Sexual Activity   Alcohol Use Yes    Comment: occasionally     Tobacco Use History[1]  Social History     Substance and Sexual Activity   Drug Use No     Current Medications[2]     Objective:         Vitals:    25 0945  "  BP: 113/82   Pulse: 78   Resp: 19     Physical Exam   Constitutional: She is oriented to person, place, and time. She appears well-developed.   HENT:   Head: Normocephalic.   Mouth/Throat: Mildly decreased salivary pool  Pulmonary/Chest: Effort normal.   Musculoskeletal:         General: No swelling.      Cervical back: Neck supple.      Comments: Mild tenderness of the joint line in both knees   Lymphadenopathy:     She has no cervical adenopathy.   Neurological: She is alert and oriented to person, place, and time.   Skin: No rash noted.   Vitals reviewed.      Reviewed old and all outside pertinent medical records available.    All lab results personally reviewed and interpreted by me.  Lab Results   Component Value Date    WBC 7.93 02/27/2025    HGB 13.8 02/27/2025    HCT 42.8 02/27/2025    MCV 91 02/27/2025    MCH 29.2 02/27/2025    MCHC 32.2 02/27/2025    RDW 13.0 02/27/2025     02/27/2025    MPV 11.5 02/27/2025    NEUTROABS 5.0 07/02/2015       Lab Results   Component Value Date     02/27/2025    K 4.3 02/27/2025     02/27/2025    CO2 24 02/27/2025    GLU 85 02/27/2025    BUN 16 02/27/2025    CALCIUM 9.9 02/27/2025    PROT 7.5 02/27/2025    ALBUMIN 4.1 02/27/2025    BILITOT 0.5 02/27/2025    AST 25 02/27/2025    ALKPHOS 73 02/27/2025    ALT 20 02/27/2025       Lab Results   Component Value Date    COLORU Yellow 02/10/2016    APPEARANCEUA Clear 02/10/2016    SPECGRAV 1.015 02/10/2016    PHUR 8.0 02/10/2016    PROTEINUA Negative 02/10/2016    GLUCOSEU NEGATIVE 07/02/2015    KETONESU Negative 02/10/2016    BLOODU NEGATIVE 07/02/2015    LEUKOCYTESUR Negative 02/10/2016    NITRITE Negative 02/10/2016    UROBILINOGEN Negative 02/10/2016       Lab Results   Component Value Date    CRP 14.0 (H) 02/27/2025       Lab Results   Component Value Date    RF <13.0 02/27/2025    SEDRATE 6 02/27/2025    CCPANTIBODIE 0.6 02/27/2025       No components found for: "25OHVITDTOT", "61WSASBZ0", "14OQCNWR1", " ""METHODNOTE"    No results found for: "URICACID"    Lab Results   Component Value Date    HEPCAB Negative 07/26/2022       Imaging:  All imaging reviewed and independently interpreted by me.     ASSESSMENT / PLAN:     Uma Bangura is a 45 y.o. White female with:    Encounter Diagnoses   Name Primary?    Hx of juvenile rheumatoid arthritis     Sicca syndrome with keratoconjunctivitis Yes    Arthralgia, unspecified joint      She has been having significant sicca symptoms for about 8 years.  Has been evaluated for Sjogren's in the past with all tests being negative.  She even had a lip biopsy about 8 years ago that did not show any Sjogren's.  Most recent labs showed negative DIANE, SSA, RF.  She has persistently elevated CRP since last year.  We had extensive discussion about Sjogren's diagnosis and management of sicca symptoms at this moment.  She is already doing all conservative management for dry eyes.  No changes from my standpoint  For dry mouth, recommended adding Biotene products to her regimen.  Continue drinking plenty of water.  We can consider adding pilocarpine if needed.  She does not have a history of glaucoma or asthma.  Discussed with patient that we need to monitor her joint pain given elevated CRP and her background of ALBERTO.  At this moment we will not start any treatment, we will monitor.      Follow up in about 6 months (around 12/12/2025) for Virtual Visit.    Method of contact with patient concerns: [facility]    Disclaimer:  This note is prepared using voice recognition software and as such is likely to have errors and has not been proof read. Please contact me for questions.     Time spent:  45 minutes in face to face discussion concerning diagnosis, prognosis, review of lab and test results, benefits of treatment as well as management of disease, counseling of patient and coordination of care between various health care providers.      Tanvi Quintanilla, " M.D.  Rheumatology  [facility]            [1]   Social History  Tobacco Use   Smoking Status Never   Smokeless Tobacco Never   [2]   Current Outpatient Medications:     cycloSPORINE (RESTASIS) 0.05 % ophthalmic emulsion, Place 1 drop into both eyes 2 (two) times daily., Disp: , Rfl:     magnesium gluconate (MAG-G) 27 mg magnesium (500 mg) Tab tablet, Take 27 mg by mouth once., Disp: , Rfl:     potassium citrate 99 mg Cap, Take 1 capsule by mouth once daily., Disp: , Rfl:     ubrogepant (UBRELVY) 100 mg tablet, take 1 tablet by mouth as needed may take 2nd dose at least 2 hours after first does as needed, Disp: 16 tablet, Rfl: 5    aspirin (ECOTRIN) 81 MG EC tablet, Take 1 tablet (81 mg total) by mouth once daily., Disp: 90 tablet, Rfl: 3

## 2025-06-16 ENCOUNTER — RESULTS FOLLOW-UP (OUTPATIENT)
Dept: RHEUMATOLOGY | Facility: CLINIC | Age: 45
End: 2025-06-16

## 2025-07-07 ENCOUNTER — HOSPITAL ENCOUNTER (OUTPATIENT)
Dept: RADIOLOGY | Facility: HOSPITAL | Age: 45
Discharge: HOME | End: 2025-07-07
Attending: PHYSICAL MEDICINE & REHABILITATION
Payer: COMMERCIAL

## 2025-07-07 ENCOUNTER — OFFICE VISIT (OUTPATIENT)
Dept: SPINE | Facility: CLINIC | Age: 45
End: 2025-07-07
Payer: COMMERCIAL

## 2025-07-07 VITALS — WEIGHT: 237.44 LBS | HEIGHT: 66 IN | BODY MASS INDEX: 38.16 KG/M2

## 2025-07-07 DIAGNOSIS — M54.42 ACUTE LEFT-SIDED LOW BACK PAIN WITH LEFT-SIDED SCIATICA: ICD-10-CM

## 2025-07-07 DIAGNOSIS — M54.42 ACUTE LEFT-SIDED LOW BACK PAIN WITH LEFT-SIDED SCIATICA: Primary | ICD-10-CM

## 2025-07-07 PROCEDURE — 3044F HG A1C LEVEL LT 7.0%: CPT | Mod: CPTII,S$GLB,, | Performed by: PHYSICAL MEDICINE & REHABILITATION

## 2025-07-07 PROCEDURE — 99999 PR PBB SHADOW E&M-EST. PATIENT-LVL III: CPT | Mod: PBBFAC,,, | Performed by: PHYSICAL MEDICINE & REHABILITATION

## 2025-07-07 PROCEDURE — 1160F RVW MEDS BY RX/DR IN RCRD: CPT | Mod: CPTII,S$GLB,, | Performed by: PHYSICAL MEDICINE & REHABILITATION

## 2025-07-07 PROCEDURE — 1159F MED LIST DOCD IN RCRD: CPT | Mod: CPTII,S$GLB,, | Performed by: PHYSICAL MEDICINE & REHABILITATION

## 2025-07-07 PROCEDURE — 3008F BODY MASS INDEX DOCD: CPT | Mod: CPTII,S$GLB,, | Performed by: PHYSICAL MEDICINE & REHABILITATION

## 2025-07-07 PROCEDURE — 72114 X-RAY EXAM L-S SPINE BENDING: CPT | Mod: 26,,, | Performed by: RADIOLOGY

## 2025-07-07 PROCEDURE — 72114 X-RAY EXAM L-S SPINE BENDING: CPT | Mod: TC

## 2025-07-07 PROCEDURE — 99204 OFFICE O/P NEW MOD 45 MIN: CPT | Mod: S$GLB,,, | Performed by: PHYSICAL MEDICINE & REHABILITATION

## 2025-07-07 RX ORDER — GABAPENTIN 100 MG/1
CAPSULE ORAL
Qty: 90 CAPSULE | Refills: 2 | Status: SHIPPED | OUTPATIENT
Start: 2025-07-07

## 2025-07-07 NOTE — PROGRESS NOTES
SUBJECTIVE:    Patient ID: Uma Bangura is a 45 y.o. female.    Chief Complaint: Low-back Pain    This is a 45-year-old woman who sees Dr. Anderson for her primary care.  History of migraine headaches and ACDF C5 through 7 in February of last year with Dr. Vu.  Prior to the surgery she was having left arm radicular symptoms that improved significantly postoperatively.  She presents however with a 2 week history of spontaneously occurring left-sided low back pain at the lumbosacral junction that radiates into the posterior portion of the left leg but does not extend below the knee.  No right leg symptoms.  No bowel or bladder dysfunction fever chills sweats or unexpected weight loss.  Current pain level is 2/10.  I have no imaging to review.  She is interested in physical therapy and has had success with gabapentin in the past.          Past Medical History:   Diagnosis Date    Anxiety state, unspecified     Disturbance of salivary secretion     Edema     GERD (gastroesophageal reflux disease)     Goiter, unspecified     Migraines     Nonspecific elevation of levels of transaminase or lactic acid dehydrogenase (LDH)     Polyphagia(783.6)     Unspecified sleep apnea     patient denies    Varices of other sites      Social History[1]  Past Surgical History:   Procedure Laterality Date    CERVICAL SPINE SURGERY      cage in neck     SECTION      x 2    COLONOSCOPY, SCREENING, LOW RISK PATIENT N/A 2024    Procedure: COLONOSCOPY, SCREENING, LOW RISK PATIENT;  Surgeon: Ayan Dockery DO;  Location: [facility] ENDO;  Service: Endoscopy;  Laterality: N/A;    ECHOCARDIOGRAM,TRANSESOPHAGEAL N/A 2023    Procedure: Transesophageal echo (RAYRAY) intra-procedure log documentation;  Surgeon: Thuy Jane MD;  Location: [facility] MINIMALLY INVASIVE CARDIOLOGY;  Service: Cardiology;  Laterality: N/A;    ENDOMETRIAL ABLATION      OOPHORECTOMY Left     Dermoid cyst    TUBAL LIGATION       Family History  "  Problem Relation Name Age of Onset    Endometriosis Maternal Grandmother      Diabetes Father      Hypertension Father      Endometriosis Mother      Cancer Mother          thyroid    Hypertension Mother      Breast cancer Neg Hx      Uterine cancer Neg Hx      Cervical cancer Neg Hx      Ovarian cancer Neg Hx       Vitals:    07/07/25 1508   Weight: 107.7 kg (237 lb 7 oz)   Height: 5' 6" (1.676 m)       Review of Systems   Constitutional:  Negative for chills, diaphoresis, fatigue, fever and unexpected weight change.   HENT:  Negative for trouble swallowing.    Eyes:  Negative for visual disturbance.   Respiratory:  Negative for shortness of breath.    Cardiovascular:  Negative for chest pain.   Gastrointestinal:  Negative for abdominal pain, constipation, nausea and vomiting.   Genitourinary:  Negative for difficulty urinating.   Musculoskeletal:  Negative for arthralgias, back pain, gait problem, joint swelling, myalgias, neck pain and neck stiffness.   Neurological:  Negative for dizziness, speech difficulty, weakness, light-headedness, numbness and headaches.          Objective:      Physical Exam  Neurological:      Mental Status: She is alert and oriented to person, place, and time.      Comments: She is awake and in no acute distress  Mild tenderness to palpation left lumbar paraspinous musculature at the lumbosacral junction with no palpable masses  Forward flexion of the lumbar spine is to about 60° before she complains of pain at the lumbosacral junction.  Extension causes mild pain on the left at the lumbosacral junction  She can heel and toe walk normally  Reflexes- +1-+2 reflexes at the following:   C5-Biceps   C6-Brachioradialis   C7-Triceps   L3/4-Patellar   S1-Achilles   Luann sign is negative bilaterally  Strength testing- 5/5 strength in the following muscle groups:  C5-Elbow flexion  C6-Wrist extension  C7-Elbow extension  C8-Finger flexion  T1-Finger abduction  L2-Hip flexion  L3-Knee " extension  L4-Ankle dorsiflexion  L5-Great toe extension  S1-Ankle plantar flexion       Straight leg raise on the left causes left-sided low back pain but no leg pain.  Straight leg raise on the right is negative.             Assessment:       1. Acute left-sided low back pain with left-sided sciatica           Plan:     She has a nonfocal neurological examination and no historical red flags.  I suspect she has low back pain on basis of degenerative disc disease and facet arthropathy.  She has pain with facet loading.  Possible radicular component to the posterior leg pain.  No evidence of nerve root dysfunction.  I agree with conservative treatment.  We will start her in physical therapy get some x-rays and start gabapentin 100 mg q.a.m. and 200 mg q.h.s..  Follow up with me at the completion of therapy or as needed      Acute left-sided low back pain with left-sided sciatica  -     Ambulatory Referral/Consult to Physical Therapy    Other orders  -     gabapentin (NEURONTIN) 100 MG capsule; 1 by mouth in the morning and 2 by mouth at bedtime  Dispense: 90 capsule; Refill: 2               [1]   Social History  Socioeconomic History    Marital status:    Tobacco Use    Smoking status: Never    Smokeless tobacco: Never   Substance and Sexual Activity    Alcohol use: Yes     Comment: occasionally    Drug use: No    Sexual activity: Yes     Partners: Male     Birth control/protection: See Surgical Hx     Comment:      Social Drivers of Health     Financial Resource Strain: Low Risk  (12/4/2023)    Overall Financial Resource Strain (CARDIA)     Difficulty of Paying Living Expenses: Not hard at all   Food Insecurity: No Food Insecurity (12/4/2023)    Hunger Vital Sign     Worried About Running Out of Food in the Last Year: Never true     Ran Out of Food in the Last Year: Never true   Transportation Needs: No Transportation Needs (12/4/2023)    PRAPARE - Transportation     Lack of Transportation (Medical):  No     Lack of Transportation (Non-Medical): No   Physical Activity: Inactive (12/4/2023)    Exercise Vital Sign     Days of Exercise per Week: 0 days     Minutes of Exercise per Session: 0 min   Stress: Stress Concern Present (12/4/2023)    [employer] - Occupational Stress Questionnaire     Feeling of Stress : To some extent   Housing Stability: Low Risk  (12/4/2023)    Housing Stability Vital Sign     Unable to Pay for Housing in the Last Year: No     Number of Places Lived in the Last Year: 1     Unstable Housing in the Last Year: No

## 2025-07-08 ENCOUNTER — PATIENT MESSAGE (OUTPATIENT)
Dept: SPINE | Facility: CLINIC | Age: 45
End: 2025-07-08
Payer: COMMERCIAL